# Patient Record
Sex: FEMALE | Race: BLACK OR AFRICAN AMERICAN | NOT HISPANIC OR LATINO | Employment: UNEMPLOYED | URBAN - METROPOLITAN AREA
[De-identification: names, ages, dates, MRNs, and addresses within clinical notes are randomized per-mention and may not be internally consistent; named-entity substitution may affect disease eponyms.]

---

## 2017-01-05 ENCOUNTER — OFFICE VISIT (OUTPATIENT)
Dept: FAMILY MEDICINE | Facility: CLINIC | Age: 52
End: 2017-01-05
Payer: MEDICARE

## 2017-01-05 ENCOUNTER — RADIANT APPOINTMENT (OUTPATIENT)
Dept: GENERAL RADIOLOGY | Facility: CLINIC | Age: 52
End: 2017-01-05
Attending: FAMILY MEDICINE
Payer: MEDICARE

## 2017-01-05 VITALS
OXYGEN SATURATION: 99 % | WEIGHT: 262 LBS | DIASTOLIC BLOOD PRESSURE: 98 MMHG | TEMPERATURE: 98.6 F | HEIGHT: 61 IN | HEART RATE: 92 BPM | SYSTOLIC BLOOD PRESSURE: 150 MMHG | BODY MASS INDEX: 49.47 KG/M2

## 2017-01-05 DIAGNOSIS — H10.32 ACUTE BACTERIAL CONJUNCTIVITIS OF LEFT EYE: ICD-10-CM

## 2017-01-05 DIAGNOSIS — R51.9 ACUTE NONINTRACTABLE HEADACHE, UNSPECIFIED HEADACHE TYPE: Primary | ICD-10-CM

## 2017-01-05 DIAGNOSIS — S80.01XA CONTUSION OF RIGHT KNEE, INITIAL ENCOUNTER: ICD-10-CM

## 2017-01-05 PROCEDURE — 96372 THER/PROPH/DIAG INJ SC/IM: CPT | Performed by: FAMILY MEDICINE

## 2017-01-05 PROCEDURE — 99215 OFFICE O/P EST HI 40 MIN: CPT | Mod: 25 | Performed by: FAMILY MEDICINE

## 2017-01-05 PROCEDURE — 73562 X-RAY EXAM OF KNEE 3: CPT | Mod: RT

## 2017-01-05 RX ORDER — METHYLPREDNISOLONE 4 MG
TABLET, DOSE PACK ORAL
Qty: 21 TABLET | Refills: 0 | Status: SHIPPED | OUTPATIENT
Start: 2017-01-05 | End: 2017-02-21

## 2017-01-05 RX ORDER — OFLOXACIN 3 MG/ML
SOLUTION/ DROPS OPHTHALMIC
Qty: 1 BOTTLE | Refills: 0 | Status: SHIPPED | OUTPATIENT
Start: 2017-01-05 | End: 2017-02-21

## 2017-01-05 RX ORDER — IBUPROFEN 600 MG/1
600 TABLET, FILM COATED ORAL EVERY 6 HOURS PRN
Qty: 60 TABLET | Refills: 0 | Status: SHIPPED | OUTPATIENT
Start: 2017-01-05 | End: 2017-02-21

## 2017-01-05 NOTE — NURSING NOTE
"Chief Complaint   Patient presents with     URI     Knee Pain     right       Initial /98 mmHg  Pulse 92  Temp(Src) 98.6  F (37  C) (Tympanic)  Ht 5' 1\" (1.549 m)  Wt 262 lb (118.842 kg)  BMI 49.53 kg/m2  SpO2 99% Estimated body mass index is 49.53 kg/(m^2) as calculated from the following:    Height as of this encounter: 5' 1\" (1.549 m).    Weight as of this encounter: 262 lb (118.842 kg)..  BP completed using cuff size: large Sarah Severson, CMA  "

## 2017-01-05 NOTE — PROGRESS NOTES
SUBJECTIVE:                                                    Tish De León is a 51 year old female who presents to clinic today for the following health issues:      C/o headache off and on for 3 weeks.   Acute illness concerns: left eye is swollen and red. Woke up this morning with it.     Fever: YES    Chills/Sweats: YES    Headache (location?): YES    Sinus Pressure:YES    Conjunctivitis:  YES: left    Ear Pain: no    Rhinorrhea: no     Congestion: YES    Sore Throat: YES     Cough: YES    Wheeze: no     Decreased Appetite: no     Nausea: no    Vomiting: no    Diarrhea:  no    Dysuria/Freq.: no    Fatigue/Achiness: YES    Sick/Strep Exposure: no      Therapies Tried and outcome: OTC meds and 4 ER visits    Concern - Fall - right knee pain     Onset: Tuesday    Description:   Right knee pain, swelling due to fall    Intensity: moderate, severe    Progression of Symptoms:  worsening    Accompanying Signs & Symptoms:         Previous history of similar problem:       Precipitating factors:   Worsened by:     Alleviating factors:  Improved by:        Therapies Tried and outcome: ice, ibuprofen, tylenol         Problem list and histories reviewed & adjusted, as indicated.  Additional history: as documented    Patient Active Problem List   Diagnosis     Hyperlipidemia LDL goal <160     Major depressive disorder, single episode, severe (H)     ZOE (obstructive sleep apnea)     BMI 45.0-49.9, adult (H)     Vitamin D deficiency disease     Intertrigo     Obesity     Intermittent asthma     Dependent edema     Knee pain     Acute abdominal pain     Gastroenteritis     GE (gastroenteritis)     Sickle cell trait (H)     Anxiety     Gastritis, Helicobacter pylori     Elbow pain     Esophageal reflux     Morbid obesity, unspecified obesity type (H)     Duodenal ulcer without hemorrhage or perforation and without obstruction     Advanced directives, counseling/discussion     Binge eating disorder     Past Surgical History    Procedure Laterality Date     Sinus surgery  10/2011     Nasal surgery      section  1993     x4     Esophagoscopy, gastroscopy, duodenoscopy (egd), combined  2013     Procedure: COMBINED ESOPHAGOSCOPY, GASTROSCOPY, DUODENOSCOPY (EGD), BIOPSY SINGLE OR MULTIPLE;  COMBINED ESOPHAGOSCOPY, GASTROSCOPY, DUODENOSCOPY (EGD);  Surgeon: Luis Ma MD;  Location:  GI     Cholecystectomy  2015     Esophagoscopy, gastroscopy, duodenoscopy (egd), combined N/A 2016     Procedure: COMBINED ESOPHAGOSCOPY, GASTROSCOPY, DUODENOSCOPY (EGD);  Surgeon: Kirit Hutchinson MD;  Location:  GI     Esophagoscopy, gastroscopy, duodenoscopy (egd), combined N/A 2016     Procedure: COMBINED ESOPHAGOSCOPY, GASTROSCOPY, DUODENOSCOPY (EGD), BIOPSY SINGLE OR MULTIPLE;  Surgeon: Kirit Hutchinson MD;  Location:  GI       Social History   Substance Use Topics     Smoking status: Never Smoker      Smokeless tobacco: Never Used     Alcohol Use: No     Family History   Problem Relation Age of Onset     Hypertension Mother      Depression Sister      Anxiety Disorder Sister      Bipolar Disorder Son      Anxiety Disorder Paternal Aunt      Depression Paternal Aunt          Current Outpatient Prescriptions   Medication Sig Dispense Refill     methylPREDNISolone (MEDROL DOSEPAK) 4 MG tablet Follow package instructions 21 tablet 0     ofloxacin (OCUFLOX) 0.3 % ophthalmic solution Instill 1-2 drops in the affected eye(s) every 2 hours while awake for 2 days then 1-2 drops every 4 hours while awake for the next 5 days. 1 Bottle 0     ibuprofen (ADVIL/MOTRIN) 600 MG tablet Take 1 tablet (600 mg) by mouth every 6 hours as needed for moderate pain 60 tablet 0     dexlansoprazole (DEXILANT) 30 MG CPDR Take 1 capsule (30 mg) by mouth 2 times daily 60 capsule 1     cyclobenzaprine (FLEXERIL) 5 MG tablet Take 1-2 tablets (5-10 mg) by mouth 2 times daily as needed for muscle spasms 60 tablet 1     sucralfate (CARAFATE) 1 GM tablet  "Take 1 tablet (1 g) by mouth 4 times daily 40 tablet 1     fluticasone (FLONASE) 50 MCG/ACT nasal spray Spray 2 sprays into both nostrils daily 16 g 1     ketotifen (ZADITOR) 0.025 % SOLN Place 1 drop into both eyes every 12 hours 1 Bottle 11     furosemide (LASIX) 20 MG tablet Take 1 tablet (20 mg) by mouth 2 times daily 60 tablet 1     potassium chloride (K-DUR) 10 MEQ tablet Take 1 tablet (10 mEq) by mouth daily 30 tablet 1     zolpidem (AMBIEN) 10 MG tablet        Cholecalciferol (VITAMIN D) 2000 UNITS tablet Take 6000 IU daily for 6 weeks then take 2000 IU daily therafter 100 tablet 3     fluticasone-salmeterol (ADVAIR DISKUS) 100-50 MCG/DOSE diskus inhaler Inhale 1 puff into the lungs 2 times daily 1 Inhaler 12     calcium carbonate (OS- MG [place]. [place]) 500 MG tablet Take 1 tablet (500 mg) by mouth 2 times daily 180 tablet 0     albuterol (PROAIR HFA, PROVENTIL HFA, VENTOLIN HFA) 108 (90 BASE) MCG/ACT inhaler Inhale 2 puffs into the lungs every 6 hours as needed for shortness of breath / dyspnea 3 Inhaler 1     No Known Allergies    ROS:  INTEGUMENTARY/SKIN: NEGATIVE for worrisome rashes, moles or lesions  CV: NEGATIVE for chest pain, palpitations or peripheral edema    OBJECTIVE:                                                    /98 mmHg  Pulse 92  Temp(Src) 98.6  F (37  C) (Tympanic)  Ht 5' 1\" (1.549 m)  Wt 262 lb (118.842 kg)  BMI 49.53 kg/m2  SpO2 99%  Body mass index is 49.53 kg/(m^2).   GENERAL: healthy, alert, well nourished, well hydrated, mild distress. Patient laying down on the clinic tablet due to intense HA.  EYES: left conjunctiva is injected and swelling of upper lid noted.   HENT: ear canals- normal; TMs- normal; Nose- normal; no sinus tenderness. Mouth- no ulcers, no lesions  NECK: no tenderness, no adenopathy, no asymmetry, no masses, no stiffness; thyroid- normal to palpation  RESP: lungs clear to auscultation - no rales, no rhonchi, no wheezes  CV: regular rates and rhythm, " normal S1 S2, no S3 or S4 and no murmur, no click or rub   Right knee: no ecchymosis or swelling. Mild tenderness anterior on the knee joint.          ASSESSMENT/PLAN:                                                      1. Acute nonintractable headache, unspecified headache type  Questionable etiology. Toradol and solumedrol injections given in the clinic today.   Start solumedrol dose pack and ibuprofen prn tomorrow if not improving. Stay well hydrated. If any worsening of symptoms noted, may need to f/u or go to ER  - methylPREDNISolone (MEDROL DOSEPAK) 4 MG tablet; Follow package instructions  Dispense: 21 tablet; Refill: 0  - ketorolac (TORADOL) 30 MG/ML injection; Inject 1 mL (30 mg) into the muscle once for 1 dose  Dispense: 1 mL; Refill: 0  - methylPREDNISolone sodium succinate (SOLU-MEDROL) 40 MG injection; Inject 1 mL (40 mg) into the vein once for 1 dose  Dispense: 1 mL; Refill: 0    2. Acute bacterial conjunctivitis of left eye  Recommended to ofloxacin eye drops. NSAID's for pain and inflammation control. Cool compresses and hand hygiene discussed.   - ofloxacin (OCUFLOX) 0.3 % ophthalmic solution; Instill 1-2 drops in the affected eye(s) every 2 hours while awake for 2 days then 1-2 drops every 4 hours while awake for the next 5 days.  Dispense: 1 Bottle; Refill: 0    3. Contusion of right knee, initial encounter    - ibuprofen (ADVIL/MOTRIN) 600 MG tablet; Take 1 tablet (600 mg) by mouth every 6 hours as needed for moderate pain  Dispense: 60 tablet; Refill: 0  - XR Knee Right 3 Views; ordered and reviewed. No fracture noted. Recommended to f/u if no improvement noted in 2-3 weeks.         Follow up with Provider - as needed  Total time spent was 45 minutes, more than half the time was spent in counseling the patient about the disease pathogenesis, treatment plan and coordinating care.       Willi Aguilar MD  [facility]

## 2017-01-06 RX ORDER — KETOROLAC TROMETHAMINE 30 MG/ML
30 INJECTION, SOLUTION INTRAMUSCULAR; INTRAVENOUS ONCE
Qty: 1 ML | Refills: 0 | OUTPATIENT
Start: 2017-01-06 | End: 2017-01-06

## 2017-01-06 RX ORDER — METHYLPREDNISOLONE SODIUM SUCCINATE 40 MG/ML
40 INJECTION, POWDER, LYOPHILIZED, FOR SOLUTION INTRAMUSCULAR; INTRAVENOUS ONCE
Qty: 1 ML | Refills: 0 | OUTPATIENT
Start: 2017-01-06 | End: 2017-01-06

## 2017-01-19 ENCOUNTER — TRANSFERRED RECORDS (OUTPATIENT)
Dept: HEALTH INFORMATION MANAGEMENT | Facility: CLINIC | Age: 52
End: 2017-01-19

## 2017-02-21 ENCOUNTER — OFFICE VISIT (OUTPATIENT)
Dept: FAMILY MEDICINE | Facility: CLINIC | Age: 52
End: 2017-02-21
Payer: MEDICARE

## 2017-02-21 VITALS
OXYGEN SATURATION: 98 % | DIASTOLIC BLOOD PRESSURE: 87 MMHG | HEIGHT: 61 IN | SYSTOLIC BLOOD PRESSURE: 130 MMHG | TEMPERATURE: 98.3 F | BODY MASS INDEX: 50.22 KG/M2 | WEIGHT: 266 LBS | HEART RATE: 94 BPM

## 2017-02-21 DIAGNOSIS — M25.561 RIGHT KNEE PAIN, UNSPECIFIED CHRONICITY: ICD-10-CM

## 2017-02-21 DIAGNOSIS — M79.89 SWELLING OF LIMB: ICD-10-CM

## 2017-02-21 DIAGNOSIS — E66.01 MORBID OBESITY, UNSPECIFIED OBESITY TYPE (H): ICD-10-CM

## 2017-02-21 DIAGNOSIS — E55.9 VITAMIN D DEFICIENCY DISEASE: ICD-10-CM

## 2017-02-21 DIAGNOSIS — K26.9 DUODENAL ULCER WITHOUT HEMORRHAGE OR PERFORATION AND WITHOUT OBSTRUCTION: ICD-10-CM

## 2017-02-21 DIAGNOSIS — J45.30 MILD PERSISTENT ASTHMA WITHOUT COMPLICATION: ICD-10-CM

## 2017-02-21 DIAGNOSIS — Z12.31 ENCOUNTER FOR SCREENING MAMMOGRAM FOR BREAST CANCER: ICD-10-CM

## 2017-02-21 DIAGNOSIS — R10.11 RUQ ABDOMINAL PAIN: Primary | ICD-10-CM

## 2017-02-21 PROCEDURE — 99214 OFFICE O/P EST MOD 30 MIN: CPT | Performed by: FAMILY MEDICINE

## 2017-02-21 RX ORDER — ALBUTEROL SULFATE 90 UG/1
2 AEROSOL, METERED RESPIRATORY (INHALATION) EVERY 6 HOURS PRN
Qty: 3 INHALER | Refills: 1 | Status: SHIPPED | OUTPATIENT
Start: 2017-02-21

## 2017-02-21 RX ORDER — CHOLECALCIFEROL (VITAMIN D3) 50 MCG
TABLET ORAL
Qty: 100 TABLET | Refills: 3 | Status: SHIPPED | OUTPATIENT
Start: 2017-02-21

## 2017-02-21 RX ORDER — DEXLANSOPRAZOLE 30 MG/1
30 CAPSULE, DELAYED RELEASE ORAL DAILY
Qty: 90 CAPSULE | Refills: 1 | Status: SHIPPED | OUTPATIENT
Start: 2017-02-21 | End: 2017-05-23

## 2017-02-21 RX ORDER — FUROSEMIDE 20 MG
20 TABLET ORAL DAILY PRN
COMMUNITY
Start: 2017-02-21 | End: 2017-03-17

## 2017-02-21 ASSESSMENT — ANXIETY QUESTIONNAIRES
GAD7 TOTAL SCORE: 17
6. BECOMING EASILY ANNOYED OR IRRITABLE: MORE THAN HALF THE DAYS
5. BEING SO RESTLESS THAT IT IS HARD TO SIT STILL: NOT AT ALL
7. FEELING AFRAID AS IF SOMETHING AWFUL MIGHT HAPPEN: NEARLY EVERY DAY
3. WORRYING TOO MUCH ABOUT DIFFERENT THINGS: NEARLY EVERY DAY
1. FEELING NERVOUS, ANXIOUS, OR ON EDGE: NEARLY EVERY DAY
2. NOT BEING ABLE TO STOP OR CONTROL WORRYING: NEARLY EVERY DAY

## 2017-02-21 ASSESSMENT — PATIENT HEALTH QUESTIONNAIRE - PHQ9: 5. POOR APPETITE OR OVEREATING: NEARLY EVERY DAY

## 2017-02-21 NOTE — MR AVS SNAPSHOT
After Visit Summary   2/21/2017    Tish De León    MRN: [id number]           Patient Information     Date Of Birth          1965        Visit Information        Provider Department      2/21/2017 1:40 PM Willi Aguilar MD [facility] [place]        Today's Diagnoses     Right knee pain, unspecified chronicity    -  1    Encounter for screening mammogram for breast cancer        RUQ abdominal pain        Duodenal ulcer without hemorrhage or perforation and without obstruction        Swelling of limb        Vitamin D deficiency disease        Mild persistent asthma without complication        Morbid obesity, unspecified obesity type (H)           Follow-ups after your visit        Additional Services     ENDOCRINOLOGY ADULT REFERRAL       Your provider has referred you to: [facility]: [facility] [phone]   [email or web address]      Please be aware that coverage of these services is subject to the terms and limitations of your health insurance plan.  Call member services at your health plan with any benefit or coverage questions.      Please bring the following to your appointment:    >>   Any x-rays, CTs or MRIs which have been performed.  Contact the facility where they were done to arrange for  prior to your scheduled appointment.    >>   List of current medications   >>   This referral request   >>   Any documents/labs given to you for this referral            ORTHO  REFERRAL       [facility] is referring you to the Orthopedic  Services at [facility].       The  Representative will assist you in the coordination of your Orthopedic and Musculoskeletal Care as prescribed by your physician.    The  Representative will call you within 1 business day to help schedule your appointment, or you may contact the  Representative at:    All areas ~ [phone]  [phone]     Type of Referral : Surgical / Specialist       Timeframe requested: 3 - 5 days    Coverage of these services is subject to the terms and limitations of your health insurance plan.  Please call member services at your health plan with any benefit or coverage questions.      If X-rays, CT or MRI's have been performed, please contact the facility where they were done to arrange for , prior to your scheduled appointment.  Please bring this referral request to your appointment and present it to your specialist.                  Follow-up notes from your care team     Return in about 2 weeks (around 3/7/2017) for mood recheck , asthma check .      Future tests that were ordered for you today     Open Future Orders        Priority Expected Expires Ordered    CT Abdomen Pelvis w/o & w Contrast Routine  2/21/2018 2/21/2017    *MA Screening Digital Bilateral Routine  2/21/2018 2/21/2017            Who to contact     If you have questions or need follow up information about today's clinic visit or your schedule please contact [facility] [place] directly at [phone].  Normal or non-critical lab and imaging results will be communicated to you by MyChart, letter or phone within 4 business days after the clinic has received the results. If you do not hear from us within 7 days, please contact the clinic through [email or web address] or phone. If you have a critical or abnormal lab result, we will notify you by phone as soon as possible.  Submit refill requests through [employer] or call your pharmacy and they will forward the refill request to us. Please allow 3 business days for your refill to be completed.          Additional Information About Your Visit        [email or web address] Information     [employer] lets you send messages to your doctor, view your test results, renew your prescriptions, schedule appointments and more. To sign up, go to [email or web address] . Click on \"Log in\" on the left side of the screen, which " "will take you to the Welcome page. Then click on \"Sign up Now\" on the right side of the page.     You will be asked to enter the access code listed below, as well as some personal information. Please follow the directions to create your username and password.     Your access code is: [email or web address]  Expires: 2017  2:28 PM     Your access code will  in 90 days. If you need help or a new code, please call your [place] clinic or [phone].        Care EveryWhere ID     This is your Care EveryWhere ID. This could be used by other organizations to access your [place] medical records  [id number]        Your Vitals Were     Pulse Temperature Height Pulse Oximetry BMI (Body Mass Index)       94 98.3  F (36.8  C) (Tympanic) 5' 1\" (1.549 m) 98% 50.26 kg/m2        Blood Pressure from Last 3 Encounters:   17 130/87   17 (!) 150/98   16 126/86    Weight from Last 3 Encounters:   17 266 lb (120.7 kg)   17 262 lb (118.8 kg)   16 267 lb (121.1 kg)              We Performed the Following     ENDOCRINOLOGY ADULT REFERRAL     ORTHO  REFERRAL          Today's Medication Changes          These changes are accurate as of: 17  2:28 PM.  If you have any questions, ask your nurse or doctor.               Start taking these medicines.        Dose/Directions    fluticasone-salmeterol 250-50 MCG/DOSE diskus inhaler   Commonly known as:  ADVAIR   Used for:  Mild persistent asthma without complication   Replaces:  fluticasone-salmeterol 100-50 MCG/DOSE diskus inhaler   Started by:  Willi Aguilar MD        Dose:  1 puff   Inhale 1 puff into the lungs 2 times daily   Quantity:  1 Inhaler   Refills:  3         These medicines have changed or have updated prescriptions.        Dose/Directions    dexlansoprazole 30 MG Cpdr CR capsule   Commonly known as:  DEXILANT   This may have changed:  when to take this   Used for:  Duodenal ulcer without hemorrhage or perforation and without " obstruction   Changed by:  Willi Aguilar MD        Dose:  30 mg   Take 1 capsule (30 mg) by mouth daily   Quantity:  90 capsule   Refills:  1       LASIX 20 MG tablet   This may have changed:    - when to take this  - reasons to take this   Used for:  Swelling of limb   Generic drug:  furosemide   Changed by:  Willi Aguilar MD        Dose:  20 mg   Take 1 tablet (20 mg) by mouth daily as needed   Refills:  0         Stop taking these medicines if you haven't already. Please contact your care team if you have questions.     cyclobenzaprine 5 MG tablet   Commonly known as:  FLEXERIL   Stopped by:  Willi Aguilar MD           fluticasone 50 MCG/ACT spray   Commonly known as:  FLONASE   Stopped by:  Willi Aguilar MD           fluticasone-salmeterol 100-50 MCG/DOSE diskus inhaler   Commonly known as:  ADVAIR DISKUS   Replaced by:  fluticasone-salmeterol 250-50 MCG/DOSE diskus inhaler   Stopped by:  Willi Aguilar MD           ibuprofen 600 MG tablet   Commonly known as:  ADVIL/MOTRIN   Stopped by:  Willi Aguilar MD           methylPREDNISolone 4 MG tablet   Commonly known as:  MEDROL DOSEPAK   Stopped by:  Willi Aguilar MD           ofloxacin 0.3 % ophthalmic solution   Commonly known as:  OCUFLOX   Stopped by:  Willi Aguilar MD           sucralfate 1 GM tablet   Commonly known as:  CARAFATE   Stopped by:  Willi Aguilar MD                Where to get your medicines      These medications were sent to [facility] [place] [address] [facility] [address]     Phone:  [phone]     albuterol 108 (90 BASE) MCG/ACT Inhaler    dexlansoprazole 30 MG Cpdr CR capsule    fluticasone-salmeterol 250-50 MCG/DOSE diskus inhaler    vitamin D 2000 UNITS tablet                Primary Care Provider Office Phone # Fax #    Willi Aguilar [phone]       [address]  [place] [address]        Thank you!      Thank you for choosing [facility] [place]  for your care. Our goal is always to provide you with excellent care. Hearing back from our patients is one way we can continue to improve our services. Please take a few minutes to complete the written survey that you may receive in the mail after your visit with us. Thank you!             Your Updated Medication List - Protect others around you: Learn how to safely use, store and throw away your medicines at www.disposemymeds.org.          This list is accurate as of: 2/21/17  2:28 PM.  Always use your most recent med list.                   Brand Name Dispense Instructions for use    albuterol 108 (90 BASE) MCG/ACT Inhaler    PROAIR HFA/PROVENTIL HFA/VENTOLIN HFA    3 Inhaler    Inhale 2 puffs into the lungs every 6 hours as needed for shortness of breath / dyspnea       calcium carbonate 500 MG tablet    OS- mg [place]. [place]    180 tablet    Take 1 tablet (500 mg) by mouth 2 times daily       dexlansoprazole 30 MG Cpdr CR capsule    DEXILANT    90 capsule    Take 1 capsule (30 mg) by mouth daily       fluticasone-salmeterol 250-50 MCG/DOSE diskus inhaler    ADVAIR    1 Inhaler    Inhale 1 puff into the lungs 2 times daily       ketotifen 0.025 % Soln ophthalmic solution    ZADITOR    1 Bottle    Place 1 drop into both eyes every 12 hours       LASIX 20 MG tablet   Generic drug:  furosemide      Take 1 tablet (20 mg) by mouth daily as needed       potassium chloride 10 MEQ tablet    K-TAB,KLOR-CON    30 tablet    Take 1 tablet (10 mEq) by mouth daily       vitamin D 2000 UNITS tablet     100 tablet    Take 6000 IU daily for 6 weeks then take 2000 IU daily therafter       zolpidem 10 MG tablet    AMBIEN

## 2017-02-21 NOTE — NURSING NOTE
"Chief Complaint   Patient presents with     Knee Pain     Abdominal Pain       Initial /87 (BP Location: Left arm, Patient Position: Chair, Cuff Size: Adult Large)  Pulse 94  Temp 98.3  F (36.8  C) (Tympanic)  Ht 5' 1\" (1.549 m)  Wt 266 lb (120.7 kg)  SpO2 98%  BMI 50.26 kg/m2 Estimated body mass index is 50.26 kg/(m^2) as calculated from the following:    Height as of this encounter: 5' 1\" (1.549 m).    Weight as of this encounter: 266 lb (120.7 kg).  Medication Reconciliation: complete  "

## 2017-02-21 NOTE — PROGRESS NOTES
SUBJECTIVE:                                                    Tish De León is a 51 year old female who presents to clinic today for the following health issues:      ABDOMINAL PAIN     Onset: flare-up    Description:   Character: Sharp  Location: right upper quadrant  Radiation: Back    Intensity: moderate, severe    Progression of Symptoms:  worsening    Accompanying Signs & Symptoms:  Fever/Chills?: no   Gas/Bloating: YES  Nausea: no   Vomitting: no   Diarrhea?: no   Constipation:YES  Dysuria or Hematuria: no    History:   Trauma: no   Previous similar pain: YES   Previous tests done: gallbladder removed about 3-4 years ago    Precipitating factors:   Does the pain change with:     Food: no      BM: no     Urination: no     Alleviating factors:      Therapies Tried and outcome: ibuprofen     LMP:  unsure     Joint Pain     Onset: flare up    Description:   Location: right knee  Character: Sharp    Intensity: moderate, severe    Progression of Symptoms: worse    Accompanying Signs & Symptoms:  Other symptoms: numbness and tingling   History:   Previous similar pain: no       Precipitating factors:   Trauma or overuse: YES fell about a month ago and has got worse since then      Alleviating factors:  Improved by: nothing       Therapies Tried and outcome: tried everything....nothing helps    GERD : well controlled with Dexilant. Needs refills.   Leg swelling is stable with lasix.     Asthma is not well controlled.     ACT Total Scores 2/21/2017   ACT TOTAL SCORE (Goal Greater than or Equal to 20) 12   In the past 12 months, how many times did you visit the emergency room for your asthma without being admitted to the hospital? 0   In the past 12 months, how many times were you hospitalized overnight because of your asthma? 0       Problem list and histories reviewed & adjusted, as indicated.  Additional history: as documented    Patient Active Problem List   Diagnosis     Hyperlipidemia LDL goal <160     Major  depressive disorder, single episode, severe (H)     ZOE (obstructive sleep apnea)     BMI 45.0-49.9, adult (H)     Vitamin D deficiency disease     Intertrigo     Obesity     Intermittent asthma     Dependent edema     Knee pain     Acute abdominal pain     Gastroenteritis     GE (gastroenteritis)     Sickle cell trait (H)     Anxiety     Gastritis, Helicobacter pylori     Elbow pain     Esophageal reflux     Morbid obesity, unspecified obesity type (H)     Duodenal ulcer without hemorrhage or perforation and without obstruction     Advanced directives, counseling/discussion     Binge eating disorder     Past Surgical History   Procedure Laterality Date     Sinus surgery  10/2011     Nasal surgery      section  1993     x4     Esophagoscopy, gastroscopy, duodenoscopy (egd), combined  2013     Procedure: COMBINED ESOPHAGOSCOPY, GASTROSCOPY, DUODENOSCOPY (EGD), BIOPSY SINGLE OR MULTIPLE;  COMBINED ESOPHAGOSCOPY, GASTROSCOPY, DUODENOSCOPY (EGD);  Surgeon: Luis Ma MD;  Location:  GI     Cholecystectomy  2015     Esophagoscopy, gastroscopy, duodenoscopy (egd), combined N/A 2016     Procedure: COMBINED ESOPHAGOSCOPY, GASTROSCOPY, DUODENOSCOPY (EGD);  Surgeon: Kirit Hutchinson MD;  Location:  GI     Esophagoscopy, gastroscopy, duodenoscopy (egd), combined N/A 2016     Procedure: COMBINED ESOPHAGOSCOPY, GASTROSCOPY, DUODENOSCOPY (EGD), BIOPSY SINGLE OR MULTIPLE;  Surgeon: Kirit Hutchinson MD;  Location:  GI       Social History   Substance Use Topics     Smoking status: Never Smoker     Smokeless tobacco: Never Used     Alcohol use No     Family History   Problem Relation Age of Onset     Hypertension Mother      Depression Sister      Anxiety Disorder Sister      Bipolar Disorder Son      Anxiety Disorder Paternal Aunt      Depression Paternal Aunt          Current Outpatient Prescriptions   Medication Sig Dispense Refill     dexlansoprazole (DEXILANT) 30 MG CPDR CR capsule Take 1  "capsule (30 mg) by mouth daily 90 capsule 1     furosemide (LASIX) 20 MG tablet Take 1 tablet (20 mg) by mouth daily as needed       Cholecalciferol (VITAMIN D) 2000 UNITS tablet Take 6000 IU daily for 6 weeks then take 2000 IU daily therafter 100 tablet 3     fluticasone-salmeterol (ADVAIR) 250-50 MCG/DOSE diskus inhaler Inhale 1 puff into the lungs 2 times daily 1 Inhaler 3     albuterol (PROAIR HFA/PROVENTIL HFA/VENTOLIN HFA) 108 (90 BASE) MCG/ACT Inhaler Inhale 2 puffs into the lungs every 6 hours as needed for shortness of breath / dyspnea 3 Inhaler 1     ketotifen (ZADITOR) 0.025 % SOLN Place 1 drop into both eyes every 12 hours 1 Bottle 11     potassium chloride (K-DUR) 10 MEQ tablet Take 1 tablet (10 mEq) by mouth daily 30 tablet 1     zolpidem (AMBIEN) 10 MG tablet        calcium carbonate (OS- MG [place]. [place]) 500 MG tablet Take 1 tablet (500 mg) by mouth 2 times daily 180 tablet 0     No Known Allergies    ROS:  C: NEGATIVE for fever, chills, change in weight  INTEGUMENTARY/SKIN: NEGATIVE for worrisome rashes, moles or lesions  E/M: NEGATIVE for ear, mouth and throat problems  CV: NEGATIVE for chest pain, palpitations or peripheral edema    OBJECTIVE:                                                    /87 (BP Location: Left arm, Patient Position: Chair, Cuff Size: Adult Large)  Pulse 94  Temp 98.3  F (36.8  C) (Tympanic)  Ht 5' 1\" (1.549 m)  Wt 266 lb (120.7 kg)  SpO2 98%  BMI 50.26 kg/m2  Body mass index is 50.26 kg/(m^2).   GENERAL: healthy, alert, well nourished, well hydrated, no distress  HENT: ear canals- normal; TMs- normal; Nose- normal; Mouth- no ulcers, no lesions  NECK: no tenderness, no adenopathy, no asymmetry, no masses, no stiffness; thyroid- normal to palpation  RESP: lungs clear to auscultation - no rales, no rhonchi, no wheezes  CV: regular rates and rhythm, normal S1 S2, no S3 or S4 and no murmur, no click or rub -  ABDOMEN: soft, no tenderness, no  hepatosplenomegaly, no " masses, normal bowel sounds         ASSESSMENT/PLAN:                                                      1. RUQ abdominal pain  Questionable etiology. Recommended to get CT as ordered below for further evaluation.   - CT Abdomen Pelvis w/o & w Contrast; Future    2. Right knee pain, unspecified chronicity  Recommended to see ortho for management of ongoing right knee pain. Weight loss recommended  - ORTHO  REFERRAL    3. Duodenal ulcer without hemorrhage or perforation and without obstruction  Refill on dexilant ordered. Symptoms are well controlled.   - dexlansoprazole (DEXILANT) 30 MG CPDR CR capsule; Take 1 capsule (30 mg) by mouth daily  Dispense: 90 capsule; Refill: 1    4. Swelling of limb  Use Lasix prn.  - furosemide (LASIX) 20 MG tablet; Take 1 tablet (20 mg) by mouth daily as needed    5. Vitamin D deficiency disease  Continue vit D  - Cholecalciferol (VITAMIN D) 2000 UNITS tablet; Take 6000 IU daily for 6 weeks then take 2000 IU daily therafter  Dispense: 100 tablet; Refill: 3    6. Asthma - not well controlled.     Increase the dose of Advair from 100/50 to 250/50 mcg.  Albuterol ordered. F/u in 2 weeks for a recheck.   - fluticasone-salmeterol (ADVAIR) 250-50 MCG/DOSE diskus inhaler; Inhale 1 puff into the lungs 2 times daily  Dispense: 1 Inhaler; Refill: 3  - albuterol (PROAIR HFA/PROVENTIL HFA/VENTOLIN HFA) 108 (90 BASE) MCG/ACT Inhaler; Inhale 2 puffs into the lungs every 6 hours as needed for shortness of breath / dyspnea  Dispense: 3 Inhaler; Refill: 1    7. Encounter for screening mammogram for breast cancer  Screening mammogram ordered  - *MA Screening Digital Bilateral; Future    8. Morbid obesity, unspecified obesity type (H)  Recommended to see endocrinology for weight loss management.   - ENDOCRINOLOGY ADULT REFERRAL         Willi Aguilar [place]  [facility] [place]

## 2017-02-22 ASSESSMENT — PATIENT HEALTH QUESTIONNAIRE - PHQ9: SUM OF ALL RESPONSES TO PHQ QUESTIONS 1-9: 19

## 2017-02-22 ASSESSMENT — ASTHMA QUESTIONNAIRES: ACT_TOTALSCORE: 12

## 2017-02-22 ASSESSMENT — ANXIETY QUESTIONNAIRES: GAD7 TOTAL SCORE: 17

## 2017-02-28 ENCOUNTER — PRE VISIT (OUTPATIENT)
Dept: ORTHOPEDICS | Facility: CLINIC | Age: 52
End: 2017-02-28

## 2017-02-28 NOTE — TELEPHONE ENCOUNTER
Pt reports being seen for : Right knee pain, fell 1 month ago  1. Do you have recent xrays (if not seen in EPIC)? Yes - Xray are in EPIC.  2. Do you have any MRI's (if not seen in EPIC)?No.   3. Have you had surgery in the past for the same issue?No.   4. Are you being referred by another provider? Yes: Dr. Aguilar  If yes-Records in Epic.  5. Is this work comp or MVA related? No.   Chloe Santos RN

## 2017-03-09 ENCOUNTER — OFFICE VISIT (OUTPATIENT)
Dept: ORTHOPEDICS | Facility: CLINIC | Age: 52
End: 2017-03-09
Payer: MEDICARE

## 2017-03-09 VITALS — OXYGEN SATURATION: 99 % | HEART RATE: 90 BPM | SYSTOLIC BLOOD PRESSURE: 131 MMHG | DIASTOLIC BLOOD PRESSURE: 85 MMHG

## 2017-03-09 DIAGNOSIS — M17.11 PRIMARY OSTEOARTHRITIS OF RIGHT KNEE: Primary | ICD-10-CM

## 2017-03-09 PROCEDURE — 99203 OFFICE O/P NEW LOW 30 MIN: CPT | Mod: GC | Performed by: ORTHOPAEDIC SURGERY

## 2017-03-09 ASSESSMENT — PAIN SCALES - GENERAL: PAINLEVEL: WORST PAIN (10)

## 2017-03-09 NOTE — MR AVS SNAPSHOT
After Visit Summary   3/9/2017    Tish De León    MRN: [id number]           Patient Information     Date Of Birth          1965        Visit Information        Provider Department      3/9/2017 1:30 PM Matthias Rosales MD [facility]        Today's Diagnoses     Primary osteoarthritis of right knee    -  1      Care Instructions    Thanks for coming today.  Ortho/Sports Medicine Clinic  [address] [place] [id number]    To schedule future appointments in Ortho Clinic, you may call [phone].    To schedule ordered imaging by your Provider: Call [facility] at [phone]    [employer] available online at:   [employer]    Please call if any further questions or concerns [phone] and ask for the Orthopedic Department. Clinic hours 8 am to 5 pm.    Return to clinic if symptoms worsen.          Follow-ups after your visit        Your next 10 appointments already scheduled     Mar 16, 2017  1:45 PM CDT   CT ABDOMEN PELVIS W/O & W CONTRAST with MGCT1   [facility] ([facility]    [address] [address] [place] [id number] [phone]           Please bring any scans or X-rays taken at other hospitals, if similar tests were done. Also bring a list of your medicines, including vitamins, minerals and over-the-counter drugs. It is safest to leave personal items at home.  Be sure to tell your doctor:   If you have any allergies.   If there s any chance you are pregnant.   If you are breastfeeding.   If you have any special needs.  You may have contrast for this exam. To prepare:   Do not eat or drink for 2 hours before your exam. If you need to take medicine, you may take it with small sips of water. (We may ask you to take liquid medicine as well.)   The day before your exam, drink extra fluids at least six 8-ounce glasses (unless your doctor tells you to restrict your fluids).  Patients over 70  or patients with diabetes or kidney problems:   If you haven t had a blood test (creatinine test) within the last 30 days, go to your clinic or Diagnostic Imaging Department for this test.  If you have diabetes:   If your kidney function is normal, continue taking your metformin (Avandamet, Glucophage, Glucovance, Metaglip) on the day of your exam.   If your kidney function is abnormal, wait 48 hours before restarting this medicine.  You will have oral contrast for this exam:   You will drink the contrast at home. Get this from your clinic or Diagnostic Imaging Department. Please follow the directions given.  Please wear loose clothing, such as a sweat suit or jogging clothes. Avoid snaps, zippers and other metal. We may ask you to undress and put on a hospital gown.  If you have any questions, please call the Imaging Department where you will have your exam.            Mar 16, 2017  2:15 PM CDT   MA SCREENING DIGITAL BILATERAL with MGMA1, MG MA TECH   [facility] ([facility]    [address] [place] [id number] [phone]           Do not use any powder, lotion or deodorant under your arms or on your breast. If you do, we will ask you to remove it before your exam.  Wear comfortable, two-piece clothing.  If you have any allergies, tell your care team.  Bring any previous mammograms from other facilities or have them mailed to the breast center. This mammogram location, [facility], now offers 3D mammography. It doesn't replace a screening mammogram and can be done with a regular screening mammogram. It is optional and not all insurances will pay for it. 3D mammography is a special kind of mammogram that produces a three-dimensional image of the breast by using low dose-xrays. 3D allows the radiologist to see the breast tissue differently from 2D, which reduces the chance of repeat testing due to overlapping breast tissue. If you are interested in have a  3D mammogram, please check with your insurance before you arrive for your exam. 3D mammography is offered to all patients. On the day of your exam you will be asked to sign a form stating yes, you wish to have 3D imaging or, no, you decline.            Mar 29, 2017 12:30 PM CDT   New Visit with Rina Peguero MD   [facility] ([facility])    [address]  [facility] [id number] [phone]              Who to contact     If you have questions or need follow up information about today's clinic visit or your schedule please contact [facility] directly at [phone].  Normal or non-critical lab and imaging results will be communicated to you by [employer], letter or phone within 4 business days after the clinic has received the results. If you do not hear from us within 7 days, please contact the clinic through [employer] or phone. If you have a critical or abnormal lab result, we will notify you by phone as soon as possible.  Submit refill requests through [employer] or call your pharmacy and they will forward the refill request to us. Please allow 3 business days for your refill to be completed.          Additional Information About Your Visit        [employer] Information     [employer] is an electronic gateway that provides easy, online access to your medical records. With [employer], you can request a clinic appointment, read your test results, renew a prescription or communicate with your care team.     To sign up for [employer] visit the website at [email or web address]   You will be asked to enter the access code listed below, as well as some personal information. Please follow the directions to create your username and password.     Your access code is: [email or web address]  Expires: 2017  2:28 PM     Your access code will  in 90 days. If you need help or a new code, please contact your [facility] or call  [phone] for assistance.        Care EveryWhere ID     This is your Care EveryWhere ID. This could be used by other organizations to access your [facility] medical records  [id number]        Your Vitals Were     Pulse Pulse Oximetry                90 99%           Blood Pressure from Last 3 Encounters:   03/09/17 131/85   02/21/17 130/87   01/05/17 (!) 150/98    Weight from Last 3 Encounters:   02/21/17 120.7 kg (266 lb)   01/05/17 118.8 kg (262 lb)   09/28/16 121.1 kg (267 lb)              Today, you had the following     No orders found for display       Primary Care Provider Office Phone # Fax #    Willi Aguilar [phone]       [facility] [address] [place] [address] [address]  [place] [address]        Thank you!     Thank you for choosing [facility]  for your care. Our goal is always to provide you with excellent care. Hearing back from our patients is one way we can continue to improve our services. Please take a few minutes to complete the written survey that you may receive in the mail after your visit with us. Thank you!             Your Updated Medication List - Protect others around you: Learn how to safely use, store and throw away your medicines at www.disposemymeds.org.          This list is accurate as of: 3/9/17 11:59 PM.  Always use your most recent med list.                   Brand Name Dispense Instructions for use    albuterol 108 (90 BASE) MCG/ACT Inhaler    PROAIR HFA/PROVENTIL HFA/VENTOLIN HFA    3 Inhaler    Inhale 2 puffs into the lungs every 6 hours as needed for shortness of breath / dyspnea       calcium carbonate 500 MG tablet    OS- mg [place]. [place]    180 tablet    Take 1 tablet (500 mg) by mouth 2 times daily       dexlansoprazole 30 MG Cpdr CR capsule    DEXILANT    90 capsule    Take 1 capsule (30 mg) by mouth daily       fluticasone-salmeterol 250-50 MCG/DOSE diskus inhaler    ADVAIR    1 Inhaler    Inhale 1 puff into the lungs 2 times  daily       ketotifen 0.025 % Soln ophthalmic solution    ZADITOR    1 Bottle    Place 1 drop into both eyes every 12 hours       LASIX 20 MG tablet   Generic drug:  furosemide      Take 1 tablet (20 mg) by mouth daily as needed       potassium chloride 10 MEQ tablet    K-TAB,KLOR-CON    30 tablet    Take 1 tablet (10 mEq) by mouth daily       vitamin D 2000 UNITS tablet     100 tablet    Take 6000 IU daily for 6 weeks then take 2000 IU daily therafter       zolpidem 10 MG tablet    AMBIEN

## 2017-03-09 NOTE — PATIENT INSTRUCTIONS
Thanks for coming today.  Ortho/Sports Medicine Clinic  [address] [place] [address]    To schedule future appointments in Ortho Clinic, you may call [phone].    To schedule ordered imaging by your Provider: Call [facility] at [phone]    [employer] available online at:   [email or web address]    Please call if any further questions or concerns [phone] and ask for the Orthopedic Department. Clinic hours 8 am to 5 pm.    Return to clinic if symptoms worsen.

## 2017-03-09 NOTE — PROGRESS NOTES
Patient seen and examined. Agree with history, physical and imaging as well as Assessment and Plan as detailed by Dr. Giles.    Assesment: Medial compartment OA    Plan: Patient seems to have failed nonop management - injections, time, nsaids (ulcers).    Patient BMI 50 and age 51 certainly are consideration prior to arthroplasty.    Patient will not likely see any benefit from arthroscopy, will refer to total joint surgeon to discuss the pros and cons of joint replacement.    F/u with me prn    I was present with the resident during the history and exam.  I discussed the case with the resident and agree with the findings as documented in the assessment and plan.

## 2017-03-09 NOTE — NURSING NOTE
"Tish De León's goals for this visit include: Right knee consult  She requests these members of her care team be copied on today's visit information: yes    PCP: Willi Aguilar    Referring Provider:  No referring provider defined for this encounter.    Chief Complaint   Patient presents with     Consult     Knee Pain     Right knee pain       Initial /85 (BP Location: Left arm)  Pulse 90  SpO2 99% Estimated body mass index is 50.26 kg/(m^2) as calculated from the following:    Height as of 2/21/17: 1.549 m (5' 1\").    Weight as of 2/21/17: 120.7 kg (266 lb).  Medication Reconciliation: complete    "

## 2017-03-09 NOTE — PROGRESS NOTES
REFERRING PROVIDER:  Willi Aguilar MD        REASON FOR REFERRAL:  Right knee pain.      HISTORY OF PRESENT ILLNESS:  Tish De León is a very pleasant 51-year-old female who presents with approximately 2-3 years of ongoing right knee pain that began without any specific inciting event.  She describes her pain as primarily over the medial aspect of her right knee, aching and throbbing in nature, occasionally becoming more sharp and stabbing in nature.  She feels the problem is getting worse with time and is aggravated by activities such as walking on it and relieved with rest.  She has tried activity modification and physical therapy as well as injections.  She states the injections are no longer effective.  It sounds as though she has seen many providers regarding this.  She has received both corticosteroids as well as viscosupplementation type injections.  She denies symptoms on her contralateral side.  She was referred by her primary care provider to the [facility] to discuss potential treatment options moving forward.  The patient states she feels she has exhausted her current treatment options.  The patient uses a wheelchair for getting around longer distances.  She is able to ambulate short distances but this does worsen her right knee pain.      PAST MEDICAL HISTORY:  Reviewed in the patient's electronic medical record.  Noted for GERD, obstructive sleep apnea, asthma, depression, anxiety as well as obesity.      PAST SURGICAL HISTORY:  Cholecystectomy.  No prior surgery on the right knee.      FAMILY HISTORY:  Reviewed on the patient's intake form noted for high blood pressure and arthritis.  Please see scanned documentation for further details.      REVIEW OF SYSTEMS:  A 14-point review of system reviewed on the patient's intake form.  Please see scanned documentation.  Pertinent positives per HPI.      SOCIAL HISTORY:  The patient is not employed.  She is disabled.  She lives at home with her  children.  She denies drug, tobacco or alcohol use.      PHYSICAL EXAMINATION:   GENERAL:  No acute distress, well-nourished female.   RESPIRATORY:  Nonlabored respirations on room air.   CARDIOVASCULAR:  Regular rate and rhythm.   EXTREMITIES:  Right lower extremity:  The patient is tender to palpation over the medial joint line.  There is no appreciable effusion.  She has crepitus with range of motion of the knees.  She has knee motion from 0 to approximately 100 degrees, limited due to body habitus.  She is stable to varus and valgus stress.  She is neurovascularly intact distally.     Left lower extremity:  The patient has no effusion, no erythema or warmth.  She has knee range of motion from full extension to 100 degrees of flexion, limited due to body habitus.  She is nontender to palpation over the medial or lateral joint line.  There is no crepitus appreciated with knee range of motion.  She is neurovascularly intact distally.      IMAGING:  Three-view x-rays of the right knee obtained 01/05/2017 are reviewed which demonstrate joint space narrowing in the medial compartment with subchondral sclerosis consistent with medial compartment wear.  The lateral compartment and patellofemoral compartment are relatively well-maintained.  There is a large soft tissue envelope.      ASSESSMENT:  A 51-year-old female with right knee osteoarthritis (primarily medial compartment).      PLAN:  We had a lengthy discussion with the patient today regarding the nature of her symptoms and correlated this with her history, physical and imaging studies.  She has right knee osteoarthritis involving primarily the medial compartment.  She has tried physical therapy and anti-inflammatories including ibuprofen, but she subsequently developed a duodenal ulcer.  She has also tried both viscosupplementation as well as corticosteroid injections.  She has persisting pain despite this.  Her physical exam aligns with her radiographic findings  of primarily medial compartment wear.  We discussed with her further treatment options including activity modification as well as weight loss as she has a BMI of 50.2.  She has an active Endocrine referral placed by her primary care provider to discuss potential weight loss strategies.  We did offer to refer her to a joint replacement surgeon either Dr. Fady Arias or Dr. Jeremy Castro through the [facility] to further discuss possible treatment options as we do not feel that an arthroscopic procedure nor a proximal tibial osteotomy to off load her medial compartment would be prudent surgeries for her given her symptoms as well as her obesity.  Shifting her load to the lateral compartment in the setting of morbid obesity would accelerate lateral compartment wear.  We also discussed that her elevated BMI would place her at a higher infection risk and risk of complication should a joint replacement ever be pursued.  She was made aware that some surgeons may not offer her surgery if her BMI is above a certain threshold due to the elevated complication risk.  She verbalized understanding of this, but would still like to discuss her symptoms with a joint placement surgeon.        Consideration could be had to provide home health services for the patient given her inability to care for herself.  She does have children who help provide assistance.  We will provide a handicap parking sticker given her limited mobility at this time, but would defer to her primary care provider to help to manage the possible referral to home health services.      The patient may follow up with Dr. Rosales as needed and may call with questions or concerns should they arise.      The patient was seen by and discussed with Dr. Rosales who is in agreement with the above assessment and plan.        Dictated by Clint Giles MD   Resident      cc:   Willi Aguilar MD   [address]   [address]

## 2017-03-16 ENCOUNTER — RADIANT APPOINTMENT (OUTPATIENT)
Dept: CT IMAGING | Facility: CLINIC | Age: 52
End: 2017-03-16
Attending: FAMILY MEDICINE
Payer: MEDICARE

## 2017-03-16 ENCOUNTER — RADIANT APPOINTMENT (OUTPATIENT)
Dept: MAMMOGRAPHY | Facility: CLINIC | Age: 52
End: 2017-03-16
Attending: FAMILY MEDICINE
Payer: MEDICARE

## 2017-03-16 DIAGNOSIS — R10.11 RUQ ABDOMINAL PAIN: ICD-10-CM

## 2017-03-16 DIAGNOSIS — K26.9 DUODENAL ULCER WITHOUT HEMORRHAGE OR PERFORATION AND WITHOUT OBSTRUCTION: ICD-10-CM

## 2017-03-16 DIAGNOSIS — Z12.31 ENCOUNTER FOR SCREENING MAMMOGRAM FOR BREAST CANCER: ICD-10-CM

## 2017-03-16 PROCEDURE — G0202 SCR MAMMO BI INCL CAD: HCPCS

## 2017-03-16 PROCEDURE — 74177 CT ABD & PELVIS W/CONTRAST: CPT | Performed by: RADIOLOGY

## 2017-03-16 RX ORDER — IOPAMIDOL 755 MG/ML
135 INJECTION, SOLUTION INTRAVASCULAR ONCE
Status: COMPLETED | OUTPATIENT
Start: 2017-03-16 | End: 2017-03-16

## 2017-03-16 RX ORDER — SUCRALFATE 1 G/1
1 TABLET ORAL 4 TIMES DAILY
Qty: 40 TABLET | Refills: 1 | Status: SHIPPED | OUTPATIENT
Start: 2017-03-16 | End: 2017-05-07

## 2017-03-16 RX ADMIN — IOPAMIDOL 135 ML: 755 INJECTION, SOLUTION INTRAVASCULAR at 14:16

## 2017-03-16 NOTE — TELEPHONE ENCOUNTER
Sucralfate      Last Written Prescription Date: 8/24/16  Last Fill Quantity: 40,  # refills: 1   Last Office Visit with G, P or [facility] prescribing provider: 2/21/17                                         Next 5 appointments (look out 90 days)     Mar 17, 2017  1:20 PM CDT   Office Visit with Willi Aguilar MD   [facility] ([facility])    [address] [place] [id number] [phone]

## 2017-03-16 NOTE — TELEPHONE ENCOUNTER
Routing refill request to provider for review/approval because:  Drug not active on patient's medication list    Vida Levine RN  [facility]  [phone]

## 2017-03-17 ENCOUNTER — OFFICE VISIT (OUTPATIENT)
Dept: FAMILY MEDICINE | Facility: CLINIC | Age: 52
End: 2017-03-17
Payer: MEDICARE

## 2017-03-17 VITALS
HEIGHT: 61 IN | HEART RATE: 96 BPM | OXYGEN SATURATION: 99 % | TEMPERATURE: 97.4 F | BODY MASS INDEX: 51.35 KG/M2 | WEIGHT: 272 LBS | SYSTOLIC BLOOD PRESSURE: 126 MMHG | DIASTOLIC BLOOD PRESSURE: 84 MMHG

## 2017-03-17 DIAGNOSIS — R10.11 ABDOMINAL PAIN, RIGHT UPPER QUADRANT: ICD-10-CM

## 2017-03-17 DIAGNOSIS — M79.661 PAIN OF RIGHT LOWER LEG: Primary | ICD-10-CM

## 2017-03-17 DIAGNOSIS — M79.89 SWELLING OF LIMB: ICD-10-CM

## 2017-03-17 DIAGNOSIS — J45.40 MODERATE PERSISTENT ASTHMA WITHOUT COMPLICATION: ICD-10-CM

## 2017-03-17 PROCEDURE — 99214 OFFICE O/P EST MOD 30 MIN: CPT | Performed by: FAMILY MEDICINE

## 2017-03-17 RX ORDER — POTASSIUM CHLORIDE 750 MG/1
10 TABLET, EXTENDED RELEASE ORAL DAILY PRN
Qty: 30 TABLET | Refills: 3 | Status: SHIPPED | OUTPATIENT
Start: 2017-03-17 | End: 2023-08-09

## 2017-03-17 RX ORDER — MONTELUKAST SODIUM 10 MG/1
10 TABLET ORAL AT BEDTIME
Qty: 90 TABLET | Refills: 3 | Status: SHIPPED | OUTPATIENT
Start: 2017-03-17

## 2017-03-17 RX ORDER — FUROSEMIDE 20 MG
20 TABLET ORAL DAILY PRN
Qty: 30 TABLET | Refills: 3 | Status: SHIPPED | OUTPATIENT
Start: 2017-03-17

## 2017-03-17 NOTE — MR AVS SNAPSHOT
After Visit Summary   3/17/2017    Tish De León    MRN: [id number]           Patient Information     Date Of Birth          1965        Visit Information        Provider Department      3/17/2017 1:20 PM Willi Aguilar MD [facility] [place]        Today's Diagnoses     Pain of right lower leg    -  1    Swelling of limb        Abdominal pain, right upper quadrant           Follow-ups after your visit        Additional Services     GASTROENTEROLOGY ADULT REF CONSULT ONLY       Preferred Location: [facility] [phone]      Please be aware that coverage of these services is subject to the terms and limitations of your health insurance plan.  Call member services at your health plan with any benefit or coverage questions.  Any procedures must be performed at a [facility] facility OR coordinated by your clinic's referral office.    Please bring the following with you to your appointment:    (1) Any X-Rays, CTs or MRIs which have been performed.  Contact the facility where they were done to arrange for  prior to your scheduled appointment.    (2) List of current medications   (3) This referral request   (4) Any documents/labs given to you for this referral            HOME CARE NURSING REFERRAL       **Order classes of: [facility], [facility] and [facility] will route to the Home Care and Hospice Referral Pool.  Home Care or Hospice will then contact the patient to schedule their appointment.**    If you do not hear from Home Care and Hospice, or you would like to call to schedule, please call the referring place of service that your provider has listed below.  ______________________________________________________________________    Your provider has referred you to: [facility]: [facility] [phone]   [email or web address]    Extended Emergency Contact Information  Primary Emergency Contact: Altagracia Schafer  States  Home Phone: none  Work Phone: none  Mobile Phone: [phone]  Relation: Daughter( Jozef)  Secondary Emergency Contact: NO SECONDARY CONTACT   [place]  Home Phone: NONE  Relation: None    Patient Anticipated Discharge Date:    RN, PT, HHA to begin 24 - 48 hours after discharge.  PLEASE EVALUATE AND TREAT (Evaluation timeline is 24 - 48 hrs. Please call if there is need for a variance to this timeline).    REASON FOR REFERRAL: Assessment & Treatment: PT, OT  and Fall Risk Assessment: Member indicated has fallen in last 12 months      ADDITIONAL SERVICES NEEDED: Home Aide    OTHER PERTINENT INFORMATION: Patient was last seen by provider on 3/17/17 for right leg pain.    Current Outpatient Prescriptions:  sucralfate (CARAFATE) 1 GM tablet, Take 1 tablet (1 g) by mouth 4 times daily, Disp: 40 tablet, Rfl: 1  dexlansoprazole (DEXILANT) 30 MG CPDR CR capsule, Take 1 capsule (30 mg) by mouth daily, Disp: 90 capsule, Rfl: 1  furosemide (LASIX) 20 MG tablet, Take 1 tablet (20 mg) by mouth daily as needed, Disp: , Rfl:   Cholecalciferol (VITAMIN D) 2000 UNITS tablet, Take 6000 IU daily for 6 weeks then take 2000 IU daily therafter, Disp: 100 tablet, Rfl: 3  fluticasone-salmeterol (ADVAIR) 250-50 MCG/DOSE diskus inhaler, Inhale 1 puff into the lungs 2 times daily, Disp: 1 Inhaler, Rfl: 3  albuterol (PROAIR HFA/PROVENTIL HFA/VENTOLIN HFA) 108 (90 BASE) MCG/ACT Inhaler, Inhale 2 puffs into the lungs every 6 hours as needed for shortness of breath / dyspnea, Disp: 3 Inhaler, Rfl: 1  ketotifen (ZADITOR) 0.025 % SOLN, Place 1 drop into both eyes every 12 hours, Disp: 1 Bottle, Rfl: 11  potassium chloride (K-DUR) 10 MEQ tablet, Take 1 tablet (10 mEq) by mouth daily, Disp: 30 tablet, Rfl: 1  zolpidem (AMBIEN) 10 MG tablet, , Disp: , Rfl:   calcium carbonate (OS- MG [place]. [place]) 500 MG tablet, Take 1 tablet (500 mg) by mouth 2 times daily, Disp: 180 tablet, Rfl: 0      Patient Active Problem List:      Hyperlipidemia LDL goal <160     Major depressive disorder, single episode, severe (H)     ZOE (obstructive sleep apnea)     BMI 45.0-49.9, adult (H)     Vitamin D deficiency disease     Intertrigo     Obesity     Intermittent asthma     Dependent edema     Knee pain     Acute abdominal pain     Gastroenteritis     GE (gastroenteritis)     Sickle cell trait (H)     Anxiety     Gastritis, Helicobacter pylori     Elbow pain     Esophageal reflux     Morbid obesity, unspecified obesity type (H)     Duodenal ulcer without hemorrhage or perforation and without obstruction     Advanced directives, counseling/discussion     Binge eating disorder      Documentation of Face to Face and Certification for Home Health Services    I certify that patient, Tish De León is under my care and that I, or a Nurse Practitioner or Physician's Assistant working with me, had a face-to-face encounter that meets the physician face-to-face encounter requirements with this patient on: 3/17/2017.    This encounter with the patient was in whole, or in part, for the following medical condition, which is the primary reason for Home Health Care: right leg and back pain and difficulty with walking. .    I certify that, based on my findings, the following services are medically necessary Home Health Services:  Occupational Therapy and Physical Therapy    My clinical findings support the need for the above services because:  Occupational Therapy Services are needed to assess and treat cognitive ability and address ADL safety due to impairment in walking. and Physical Therapy Services are needed to assess and treat the following functional impairments: leg and back pain.    Further, I certify that my clinical findings support that this patient is homebound (i.e. absences from home require considerable and taxing effort and are for medical reasons or [employer] services or infrequently or of short duration when for other reasons) because: Requires  assistance of another person or specialized equipment to access medical services because patient: Has prohibitive pain during ambulation. and Range of motion limitations prevents ability to exit home safely..    Based on the above findings, I certify that this patient is confined to the home and needs intermittent skilled nursing care, physical therapy and/or speech therapy.  The patient is under my care, and I have initiated the establishment of the plan of care.  This patient will be followed by a physician who will periodically review the plan of care.    Physician/Provider to provide follow up care: Willi Aguilar certified Physician at time of discharge:     Please be aware that coverage of these services is subject to the terms and limitations of your health insurance plan.  Call member services at your health plan with any benefit or coverage questions.                  Your next 10 appointments already scheduled     Mar 29, 2017 12:30 PM CDT   New Visit with Rina Peguero MD   [facility] ([facility])    [address] [facility] [address]  [facility] [address] [phone]              Who to contact     If you have questions or need follow up information about today's clinic visit or your schedule please contact [facility] [place] directly at [phone].  Normal or non-critical lab and imaging results will be communicated to you by MyChart, letter or phone within 4 business days after the clinic has received the results. If you do not hear from us within 7 days, please contact the clinic through MyChart or phone. If you have a critical or abnormal lab result, we will notify you by phone as soon as possible.  Submit refill requests through [facility] or call your pharmacy and they will forward the refill request to us. Please allow 3 business days for your refill to be completed.          Additional Information About Your Visit       "  MyChart Information     naaya lets you send messages to your doctor, view your test results, renew your prescriptions, schedule appointments and more. To sign up, go to [email or web address] . Click on \"Log in\" on the left side of the screen, which will take you to the Welcome page. Then click on \"Sign up Now\" on the right side of the page.     You will be asked to enter the access code listed below, as well as some personal information. Please follow the directions to create your username and password.     Your access code is: [email or web address]  Expires: 2017  3:28 PM     Your access code will  in 90 days. If you need help or a new code, please call your [place] clinic or [phone].        Care EveryWhere ID     This is your Care EveryWhere ID. This could be used by other organizations to access your [place] medical records  [id number]        Your Vitals Were     Pulse Temperature Height Pulse Oximetry BMI (Body Mass Index)       96 97.4  F (36.3  C) (Tympanic) 5' 1\" (1.549 m) 99% 51.39 kg/m2        Blood Pressure from Last 3 Encounters:   17 126/84   17 131/85   17 130/87    Weight from Last 3 Encounters:   17 272 lb (123.4 kg)   17 266 lb (120.7 kg)   17 262 lb (118.8 kg)              We Performed the Following     GASTROENTEROLOGY ADULT REF CONSULT ONLY     HOME CARE NURSING REFERRAL          Today's Medication Changes          These changes are accurate as of: 3/17/17  2:02 PM.  If you have any questions, ask your nurse or doctor.               Start taking these medicines.        Dose/Directions    acetaminophen-codeine 300-30 MG per tablet   Commonly known as:  TYLENOL #3   Used for:  Pain of right lower leg   Started by:  Willi Aguliar MD        Dose:  1 tablet   Take 1 tablet by mouth 2 times daily as needed for pain   Quantity:  30 tablet   Refills:  0       order for DME   Used for:  Pain of right lower leg   Started by:  Willi Aguilar MD        " Equipment being ordered: Wide walker with brakes and seat.   Quantity:  1 each   Refills:  0         These medicines have changed or have updated prescriptions.        Dose/Directions    potassium chloride 10 MEQ tablet   Commonly known as:  K-TAB,KLOR-CON   This may have changed:    - when to take this  - reasons to take this   Used for:  Swelling of limb   Changed by:  Willi Aguilar MD        Dose:  10 mEq   Take 1 tablet (10 mEq) by mouth daily as needed for potassium supplementation   Quantity:  30 tablet   Refills:  3            Where to get your medicines      These medications were sent to [facility] [place], [place] - [address] [place] [address]  [address] [facility], [place] [address]     Phone:  [phone]     furosemide 20 MG tablet    potassium chloride 10 MEQ tablet         Some of these will need a paper prescription and others can be bought over the counter.  Ask your nurse if you have questions.     Bring a paper prescription for each of these medications     acetaminophen-codeine 300-30 MG per tablet    order for DME                Primary Care Provider Office Phone # Fax #    Willi Aguilar [phone]       [facility] [address] [place] [address] [address]  [place] [address]        Thank you!     Thank you for choosing [facility]  for your care. Our goal is always to provide you with excellent care. Hearing back from our patients is one way we can continue to improve our services. Please take a few minutes to complete the written survey that you may receive in the mail after your visit with us. Thank you!             Your Updated Medication List - Protect others around you: Learn how to safely use, store and throw away your medicines at www.disposemymeds.org.          This list is accurate as of: 3/17/17  2:02 PM.  Always use your most recent med list.                   Brand Name Dispense Instructions for use     acetaminophen-codeine 300-30 MG per tablet    TYLENOL #3    30 tablet    Take 1 tablet by mouth 2 times daily as needed for pain       albuterol 108 (90 BASE) MCG/ACT Inhaler    PROAIR HFA/PROVENTIL HFA/VENTOLIN HFA    3 Inhaler    Inhale 2 puffs into the lungs every 6 hours as needed for shortness of breath / dyspnea       calcium carbonate 500 MG tablet    OS- mg [place]. [place]    180 tablet    Take 1 tablet (500 mg) by mouth 2 times daily       dexlansoprazole 30 MG Cpdr CR capsule    DEXILANT    90 capsule    Take 1 capsule (30 mg) by mouth daily       fluticasone-salmeterol 250-50 MCG/DOSE diskus inhaler    ADVAIR    1 Inhaler    Inhale 1 puff into the lungs 2 times daily       furosemide 20 MG tablet    LASIX    30 tablet    Take 1 tablet (20 mg) by mouth daily as needed       ketotifen 0.025 % Soln ophthalmic solution    ZADITOR    1 Bottle    Place 1 drop into both eyes every 12 hours       order for DME     1 each    Equipment being ordered: Wide walker with brakes and seat.       potassium chloride 10 MEQ tablet    K-TAB,KLOR-CON    30 tablet    Take 1 tablet (10 mEq) by mouth daily as needed for potassium supplementation       sucralfate 1 GM tablet    CARAFATE    40 tablet    Take 1 tablet (1 g) by mouth 4 times daily       vitamin D 2000 UNITS tablet     100 tablet    Take 6000 IU daily for 6 weeks then take 2000 IU daily therafter       zolpidem 10 MG tablet    AMBIEN

## 2017-03-17 NOTE — PROGRESS NOTES
SUBJECTIVE:                                                    Tish De León is a 51 year old female who presents to clinic today for the following health issues:      Concern - Review CT results - done for abdominal pain   Continues to have pain in the right upper quadrant. CAT scan was done yesterday. Results reviewed with the patient no obvious abnormality noted.       Concern - swelling in leg     Complaining of right knee pain. She has significant osteoarthritis. She was seen by orthopedics. Steroid injections have not helped her in the past. They suggested weight loss prior to getting the surgery done. She was given the option to see the orthopedic physician at the end of her stay which she has not planned to do yet.      Patient lives at home. It is hard for her to ambulate. Her daughter brought her in today. She tells that they need help with managing her care at home because it is getting harder.    Asthma Follow-Up    Was ACT completed today?    Yes    ACT Total Scores 3/17/2017   ACT TOTAL SCORE (Goal Greater than or Equal to 20) 11   In the past 12 months, how many times did you visit the emergency room for your asthma without being admitted to the hospital? 0   In the past 12 months, how many times were you hospitalized overnight because of your asthma? 0             Problem list and histories reviewed & adjusted, as indicated.  Additional history: as documented    Patient Active Problem List   Diagnosis     Hyperlipidemia LDL goal <160     Major depressive disorder, single episode, severe (H)     ZOE (obstructive sleep apnea)     BMI 45.0-49.9, adult (H)     Vitamin D deficiency disease     Intertrigo     Obesity     Intermittent asthma     Dependent edema     Knee pain     Acute abdominal pain     Gastroenteritis     GE (gastroenteritis)     Sickle cell trait (H)     Anxiety     Gastritis, Helicobacter pylori     Elbow pain     Esophageal reflux     Morbid obesity, unspecified obesity type (H)      Duodenal ulcer without hemorrhage or perforation and without obstruction     Advanced directives, counseling/discussion     Binge eating disorder     Past Surgical History:   Procedure Laterality Date      SECTION  1993    x4     CHOLECYSTECTOMY  2015     ESOPHAGOSCOPY, GASTROSCOPY, DUODENOSCOPY (EGD), COMBINED  2013    Procedure: COMBINED ESOPHAGOSCOPY, GASTROSCOPY, DUODENOSCOPY (EGD), BIOPSY SINGLE OR MULTIPLE;  COMBINED ESOPHAGOSCOPY, GASTROSCOPY, DUODENOSCOPY (EGD);  Surgeon: Luis Ma MD;  Location:  GI     ESOPHAGOSCOPY, GASTROSCOPY, DUODENOSCOPY (EGD), COMBINED N/A 2016    Procedure: COMBINED ESOPHAGOSCOPY, GASTROSCOPY, DUODENOSCOPY (EGD);  Surgeon: Kirit Hutchinson MD;  Location:  GI     ESOPHAGOSCOPY, GASTROSCOPY, DUODENOSCOPY (EGD), COMBINED N/A 2016    Procedure: COMBINED ESOPHAGOSCOPY, GASTROSCOPY, DUODENOSCOPY (EGD), BIOPSY SINGLE OR MULTIPLE;  Surgeon: Kirit Hutchinson MD;  Location:  GI     SINUS SURGERY  10/2011    Nasal surgery       Social History   Substance Use Topics     Smoking status: Never Smoker     Smokeless tobacco: Never Used     Alcohol use No     Family History   Problem Relation Age of Onset     Hypertension Mother      Depression Sister      Anxiety Disorder Sister      Bipolar Disorder Son      Anxiety Disorder Paternal Aunt      Depression Paternal Aunt          Current Outpatient Prescriptions   Medication Sig Dispense Refill     order for DME Equipment being ordered: Wide walker with brakes and seat. 1 each 0     furosemide (LASIX) 20 MG tablet Take 1 tablet (20 mg) by mouth daily as needed 30 tablet 3     potassium chloride (K-TAB,KLOR-CON) 10 MEQ tablet Take 1 tablet (10 mEq) by mouth daily as needed for potassium supplementation 30 tablet 3     acetaminophen-codeine (TYLENOL #3) 300-30 MG per tablet Take 1 tablet by mouth 2 times daily as needed for pain 30 tablet 0     montelukast (SINGULAIR) 10 MG tablet Take 1 tablet (10 mg) by mouth At  "Bedtime 90 tablet 3     sucralfate (CARAFATE) 1 GM tablet Take 1 tablet (1 g) by mouth 4 times daily 40 tablet 1     dexlansoprazole (DEXILANT) 30 MG CPDR CR capsule Take 1 capsule (30 mg) by mouth daily 90 capsule 1     Cholecalciferol (VITAMIN D) 2000 UNITS tablet Take 6000 IU daily for 6 weeks then take 2000 IU daily therafter 100 tablet 3     fluticasone-salmeterol (ADVAIR) 250-50 MCG/DOSE diskus inhaler Inhale 1 puff into the lungs 2 times daily 1 Inhaler 3     albuterol (PROAIR HFA/PROVENTIL HFA/VENTOLIN HFA) 108 (90 BASE) MCG/ACT Inhaler Inhale 2 puffs into the lungs every 6 hours as needed for shortness of breath / dyspnea 3 Inhaler 1     ketotifen (ZADITOR) 0.025 % SOLN Place 1 drop into both eyes every 12 hours 1 Bottle 11     zolpidem (AMBIEN) 10 MG tablet        calcium carbonate (OS- MG [place]. CA) 500 MG tablet Take 1 tablet (500 mg) by mouth 2 times daily 180 tablet 0     No Known Allergies    Reviewed and updated as needed this visit by clinical staff       Reviewed and updated as needed this visit by Provider         ROS:  C: NEGATIVE for fever, chills, change in weight  INTEGUMENTARY/SKIN: NEGATIVE for worrisome rashes, moles or lesions    OBJECTIVE:                                                    /84 (BP Location: Right arm, Patient Position: Chair, Cuff Size: Adult Large)  Pulse 96  Temp 97.4  F (36.3  C) (Tympanic)  Ht 5' 1\" (1.549 m)  Wt 272 lb (123.4 kg)  SpO2 99%  BMI 51.39 kg/m2  Body mass index is 51.39 kg/(m^2).   GENERAL: healthy, alert, well nourished, well hydrated, no distress  HENT: ear canals- normal; TMs- normal; Nose- normal; Mouth- no ulcers, no lesions  NECK: no tenderness, no adenopathy, no asymmetry, no masses, no stiffness; thyroid- normal to palpation  RESP: lungs clear to auscultation - no rales, no rhonchi, no wheezes  CV: regular rates and rhythm, normal S1 S2, no S3 or S4 and no murmur, no click or rub -  ABDOMEN: soft, no tenderness, no  " hepatosplenomegaly, no masses, normal bowel sounds         ASSESSMENT/PLAN:                                                      1. Pain of right lower leg  Patient has significant osteoarthritis of the right knee. That is the likely cause of the pain in the right leg. Recommended to follow up with orthopedics. She was suggested to see the orthopedic physician at the [facility] as the orthopedist she saw was not comfortable operating on her knee replacement due to her weight  Patient will be seeing an endocrinologist for her weight loss management soon.    Home care referral ordered for assessment of safety and evaluate and manage the needs.  Walker ordered.  Pain medication ordered    - HOME CARE NURSING REFERRAL  - order for DME; Equipment being ordered: Wide walker with brakes and seat.  Dispense: 1 each; Refill: 0  - acetaminophen-codeine (TYLENOL #3) 300-30 MG per tablet; Take 1 tablet by mouth 2 times daily as needed for pain  Dispense: 30 tablet; Refill: 0    2. Swelling of limb    Recommended to use Lasix 20 mg daily as needed for leg swelling. Patient has used that in the past with some benefit. When she uses Lasix and recommended her to take potassium supplement along bed. In 2 weeks, we will recheck the swelling. Salt restriction diet recommended. Recommended to elevate her legs.  - furosemide (LASIX) 20 MG tablet; Take 1 tablet (20 mg) by mouth daily as needed  Dispense: 30 tablet; Refill: 3  - potassium chloride (K-TAB,KLOR-CON) 10 MEQ tablet; Take 1 tablet (10 mEq) by mouth daily as needed for potassium supplementation  Dispense: 30 tablet; Refill: 3    3. Abdominal pain, right upper quadrant  CT scan done yesterday. Results reviewed with the patient. No obvious pathologies noted to explain the discomfort in the right upper quadrant. Patient has a small sliding hiatal hernia. Continue the use of PPI.   Questionable etiology. Recommended to see gastroenterology for further workup.  - GASTROENTEROLOGY  ADULT REF CONSULT ONLY    4. Moderate persistent asthma without complication  Not well controlled. Patient is already on albuterol and Advair. Recommended to use the medications regularly. Adding on Singulair 10 mg daily to the regimen. Side effect profile discussed. Follow up in 2 weeks. patient is not hypoxic.  - montelukast (SINGULAIR) 10 MG tablet; Take 1 tablet (10 mg) by mouth At Bedtime  Dispense: 90 tablet; Refill: 3      Follow up with Provider - 2 week for recheck.     Willi Aguilar MD  [facility]

## 2017-03-17 NOTE — NURSING NOTE
"Chief Complaint   Patient presents with     Results     review CT results     Swelling     right leg from knee to foot       Initial /84 (BP Location: Right arm, Patient Position: Chair, Cuff Size: Adult Large)  Pulse 96  Temp 97.4  F (36.3  C) (Tympanic)  Ht 5' 1\" (1.549 m)  Wt 272 lb (123.4 kg)  SpO2 99%  BMI 51.39 kg/m2 Estimated body mass index is 51.39 kg/(m^2) as calculated from the following:    Height as of this encounter: 5' 1\" (1.549 m).    Weight as of this encounter: 272 lb (123.4 kg).  Medication Reconciliation: complete  "

## 2017-03-18 ASSESSMENT — ASTHMA QUESTIONNAIRES: ACT_TOTALSCORE: 11

## 2017-03-20 ENCOUNTER — TELEPHONE (OUTPATIENT)
Dept: FAMILY MEDICINE | Facility: CLINIC | Age: 52
End: 2017-03-20

## 2017-03-20 NOTE — TELEPHONE ENCOUNTER
Sherita -  HC RN calling for verbal to delay start of care for PT to tomorrow or Wednesday. Verbal given per FM protocol.  Stephanie Asif RN   [facility] - Triage

## 2017-03-23 ENCOUNTER — TELEPHONE (OUTPATIENT)
Dept: FAMILY MEDICINE | Facility: CLINIC | Age: 52
End: 2017-03-23

## 2017-03-23 DIAGNOSIS — M79.661 PAIN OF RIGHT LOWER LEG: ICD-10-CM

## 2017-03-23 NOTE — TELEPHONE ENCOUNTER
Order faxed to [facility] and a fax confirmation was received. Pt was notified.  Padmini Quiroz,

## 2017-03-23 NOTE — TELEPHONE ENCOUNTER
Pt would like to get her walker from [facility]. [facility] needs a DME order for wide walker with brakes and seat. Please reprint the DME rx. TC please fax to [facility] [phone]. Please notify the pt once rx is faxed [phone] ok top leave message. Thank you.  Padmini Quiroz,

## 2017-03-29 ENCOUNTER — OFFICE VISIT (OUTPATIENT)
Dept: ENDOCRINOLOGY | Facility: CLINIC | Age: 52
End: 2017-03-29
Payer: MEDICARE

## 2017-03-29 VITALS
WEIGHT: 267.9 LBS | OXYGEN SATURATION: 97 % | SYSTOLIC BLOOD PRESSURE: 124 MMHG | HEIGHT: 61 IN | DIASTOLIC BLOOD PRESSURE: 84 MMHG | HEART RATE: 92 BPM | TEMPERATURE: 97.4 F | BODY MASS INDEX: 50.58 KG/M2

## 2017-03-29 DIAGNOSIS — R73.03 PREDIABETES: ICD-10-CM

## 2017-03-29 DIAGNOSIS — Z83.49 FAMILY HISTORY OF THYROID DISEASE: ICD-10-CM

## 2017-03-29 DIAGNOSIS — E66.01 MORBID OBESITY, UNSPECIFIED OBESITY TYPE (H): Primary | ICD-10-CM

## 2017-03-29 DIAGNOSIS — R73.09 ABNORMAL GLUCOSE: ICD-10-CM

## 2017-03-29 LAB — HBA1C MFR BLD: 6.2 % (ref 4.3–6)

## 2017-03-29 PROCEDURE — 99214 OFFICE O/P EST MOD 30 MIN: CPT | Performed by: INTERNAL MEDICINE

## 2017-03-29 PROCEDURE — 83036 HEMOGLOBIN GLYCOSYLATED A1C: CPT | Performed by: PATHOLOGY

## 2017-03-29 PROCEDURE — 36415 COLL VENOUS BLD VENIPUNCTURE: CPT | Performed by: INTERNAL MEDICINE

## 2017-03-29 PROCEDURE — 84443 ASSAY THYROID STIM HORMONE: CPT | Performed by: PATHOLOGY

## 2017-03-29 NOTE — PATIENT INSTRUCTIONS
[facility] & [place] locations   Dr Peguero, Endocrinology Department      [facility]   [address] [place] [address]  Appointment [phone]  Fax: [phone]  [place]: Monday and Tuesday         [facility] [address] [place] [address]  Appointment [phone]  Fax: [phone]  [place]: Wednesday and Thursday            Labs today  Follow up with dietician    24 Hour Urine Collection    - During a 24-hour urine collection, follow your usual diet and drink fluids as you ordinarily would.  - Avoid drinking alcohol before and during the urine collection.    - For a 24-hour urine collection, all of the urine that you pass over a 24-hour time period must be collected. You will receive a special container to collect the sample.    The following are directions for collecting a 24-hour urine sample while at home:    In the morning scheduled to begin the urine collection, urinate in the toilet and flush away the first urine you pass. Write down the date and time. That is the start date and time for the collection.    Collect all urine you pass, day and night, for 24 hours. Use the container given to you to collect the urine. Avoid using other containers. The urine sample must include the last urine that you pass 24 hours after starting the collection. Do not allow toilet paper, stool, or anything else to be added to the urine sample.    Write down the date and time that the last sample is collected.    A health  will reach out to you over the telephone within the next 3 business days to answer any questions you may have and set up your first coaching session.  If they are not able to reach you please feel free to call back at the number left on your voicemail.  Thank you and we are excited you are taking advantage of this wonderful opportunity to improve your health!    With this program, patients have an  opportunity to work with a Health , over the phone at no cost for 6 total coaching sessions over a period of seven months.    [facility] use a non-judgmental collaborative approach when working with patients. Coaches support patients in gaining knowledge, skills, tools and confidence to become active participants in their health.  Your  will work with you one-on-one to set small, achievable goals and provide accountability to help you meet your vision.  [facility] have had success supporting patients in the following areas:  * Chronic Disease Management  * Weight Management  * Nutrition  * Exercise  * Stress Management  * Other Health Related Goals

## 2017-03-29 NOTE — NURSING NOTE
"Chief Complaint   Patient presents with     Referral     Dr. Aguilar for obesity        Initial /84 (BP Location: Left arm, Patient Position: Chair, Cuff Size: Adult Large)  Pulse 92  Temp 97.4  F (36.3  C) (Oral)  Ht 1.549 m (5' 1\")  Wt 121.5 kg (267 lb 14.4 oz)  SpO2 97%  BMI 50.62 kg/m2 Estimated body mass index is 50.62 kg/(m^2) as calculated from the following:    Height as of this encounter: 1.549 m (5' 1\").    Weight as of this encounter: 121.5 kg (267 lb 14.4 oz).  Medication Reconciliation: complete     ENDOCRINOLOGY INTAKE FORM    Patient Name:  Tish De León  :  1965    Is patient Diabetic?   No  Does patient have non-diabetic or other endocrine issues?  Yes: obesity     Vitals: /84 (BP Location: Left arm, Patient Position: Chair, Cuff Size: Adult Large)  Pulse 92  Temp 97.4  F (36.3  C) (Oral)  Ht 1.549 m (5' 1\")  Wt 121.5 kg (267 lb 14.4 oz)  SpO2 97%  BMI 50.62 kg/m2  BMI= Body mass index is 50.62 kg/(m^2).    Flu vaccine:  Yes: 16  Pneumonia vaccine:  No    Smoking and Alcohol use:  Social History   Substance Use Topics     Smoking status: Never Smoker     Smokeless tobacco: Never Used     Alcohol use No   OBESITY CONCERNS:  No ref. provider found       Initial Visit Previous Visit Current Change   Weight 267.9      Height 5 1       Waist Circumference         Weight at graduation of high school: 150-160  Diabetes:  No  Sleep Apnea/Snores: Yes: has sleep apnea  Hypertension:  No  Hyperlipidemia:  No  Use of steroids:  YES-shots and lungs   Family history of obesity:  Yes: mom, sister    Diet:  Doesn't eat breakfast, but if she does she eats suasage alatorre, lunch will most likely be fast food   Exercise: No    Staff Signature:  Deann Carbone CMA        "

## 2017-03-29 NOTE — MR AVS SNAPSHOT
After Visit Summary   3/29/2017    Tish De León    MRN: [id number]           Patient Information     Date Of Birth          1965        Visit Information        Provider Department      3/29/2017 12:30 PM Rina Peguero MD [facility]        Today's Diagnoses     Morbid obesity, unspecified obesity type (H)    -  1    BMI 45.0-49.9, adult (H)        Family history of thyroid disease        Prediabetes        Abnormal glucose          Care Instructions    [facility] & [place] locations   Dr Peguero, Endocrinology Department      [facility]   [address] [place] [address]  Appointment [phone]  Fax: [phone]  [place]: Monday and Tuesday         [place] [address] [facility] [place] [address]  Appointment [phone]  Fax: [phone]  [place]: Wednesday and Thursday            Labs today  Follow up with dietician    24 Hour Urine Collection    - During a 24-hour urine collection, follow your usual diet and drink fluids as you ordinarily would.  - Avoid drinking alcohol before and during the urine collection.    - For a 24-hour urine collection, all of the urine that you pass over a 24-hour time period must be collected. You will receive a special container to collect the sample.    The following are directions for collecting a 24-hour urine sample while at home:    In the morning scheduled to begin the urine collection, urinate in the toilet and flush away the first urine you pass. Write down the date and time. That is the start date and time for the collection.    Collect all urine you pass, day and night, for 24 hours. Use the container given to you to collect the urine. Avoid using other containers. The urine sample must include the last urine that you pass 24 hours after starting the collection. Do not allow toilet paper, stool, or anything else to be added to  the urine sample.    Write down the date and time that the last sample is collected.    A health  will reach out to you over the telephone within the next 3 business days to answer any questions you may have and set up your first coaching session.  If they are not able to reach you please feel free to call back at the number left on your voicemail.  Thank you and we are excited you are taking advantage of this wonderful opportunity to improve your health!    With this program, patients have an opportunity to work with a Health , over the phone at no cost for 6 total coaching sessions over a period of seven months.    [facility] use a non-judgmental collaborative approach when working with patients. Coaches support patients in gaining knowledge, skills, tools and confidence to become active participants in their health.  Your  will work with you one-on-one to set small, achievable goals and provide accountability to help you meet your vision.  [facility] have had success supporting patients in the following areas:  * Chronic Disease Management  * Weight Management  * Nutrition  * Exercise  * Stress Management  * Other Health Related Goals          Follow-ups after your visit        Additional Services     HEALTH  REFERRAL       Your provider has referred you to a Health .    A Health  will reach out to the patient within three days, if the following criteria has been met.     Clinic: [facility]    Reason for Referral: Diabetes/Obesity    Patient does have knowledge of this service.    Patient Criteria: Obesity (BMI >= 35kg/m2)/ Hypertension (Blood Pressure >= 140/90)    Provider's Main Focus: Diet/Weight Loss            NUTRITION REFERRAL       Your provider has referred you to: [facility]: [facility] [phone]   [email or web address]    Please be aware that coverage of these services is subject to  "the terms and limitations of your health insurance plan.  Call member services at your health plan with any benefit or coverage questions.      Please bring the following to your appointment:      >>   This referral request   >>   Any documents given to you for this referral  >>   Any specific questions you have about diet or food choices                  Future tests that were ordered for you today     Open Future Orders        Priority Expected Expires Ordered    Cortisol free urine Routine  3/30/2018 3/29/2017            Who to contact     If you have questions or need follow up information about today's clinic visit or your schedule please contact [facility] directly at [phone].  Normal or non-critical lab and imaging results will be communicated to you by Vitasolhart, letter or phone within 4 business days after the clinic has received the results. If you do not hear from us within 7 days, please contact the clinic through Vitasolhart or phone. If you have a critical or abnormal lab result, we will notify you by phone as soon as possible.  Submit refill requests through Stroho or call your pharmacy and they will forward the refill request to us. Please allow 3 business days for your refill to be completed.          Additional Information About Your Visit        MyChart Information     Stroho lets you send messages to your doctor, view your test results, renew your prescriptions, schedule appointments and more. To sign up, go to [email or web address] . Click on \"Log in\" on the left side of the screen, which will take you to the Welcome page. Then click on \"Sign up Now\" on the right side of the page.     You will be asked to enter the access code listed below, as well as some personal information. Please follow the directions to create your username and password.     Your access code is: [email or web address]  Expires: 2017  3:28 PM     Your access code will  in 90 days. If you need help or a " "new code, please call your [facility] clinic or [phone].        Care EveryWhere ID     This is your Care EveryWhere ID. This could be used by other organizations to access your [facility] medical records  [id number]        Your Vitals Were     Pulse Temperature Height Pulse Oximetry BMI (Body Mass Index)       92 97.4  F (36.3  C) (Oral) 5' 1\" (1.549 m) 97% 50.62 kg/m2        Blood Pressure from Last 3 Encounters:   03/29/17 124/84   03/17/17 126/84   03/09/17 131/85    Weight from Last 3 Encounters:   03/29/17 267 lb 14.4 oz (121.5 kg)   03/17/17 272 lb (123.4 kg)   02/21/17 266 lb (120.7 kg)              We Performed the Following     HEALTH  REFERRAL     Hemoglobin A1c     NUTRITION REFERRAL     TSH with free T4 reflex        Primary Care Provider Office Phone # Fax #    Willi Aguilar [phone]       [facility] [place] [address] [place] [address] [address]  [place] [address]        Thank you!     Thank you for choosing [facility]  for your care. Our goal is always to provide you with excellent care. Hearing back from our patients is one way we can continue to improve our services. Please take a few minutes to complete the written survey that you may receive in the mail after your visit with us. Thank you!             Your Updated Medication List - Protect others around you: Learn how to safely use, store and throw away your medicines at www.disposemymeds.org.          This list is accurate as of: 3/29/17  1:57 PM.  Always use your most recent med list.                   Brand Name Dispense Instructions for use    acetaminophen-codeine 300-30 MG per tablet    TYLENOL #3    30 tablet    Take 1 tablet by mouth 2 times daily as needed for pain       albuterol 108 (90 BASE) MCG/ACT Inhaler    PROAIR HFA/PROVENTIL HFA/VENTOLIN HFA    3 Inhaler    Inhale 2 puffs into the lungs every 6 hours as needed for shortness of breath / dyspnea       calcium carbonate 500 MG tablet    " OS- mg [place]. Ca    180 tablet    Take 1 tablet (500 mg) by mouth 2 times daily       dexlansoprazole 30 MG Cpdr CR capsule    DEXILANT    90 capsule    Take 1 capsule (30 mg) by mouth daily       fluticasone-salmeterol 250-50 MCG/DOSE diskus inhaler    ADVAIR    1 Inhaler    Inhale 1 puff into the lungs 2 times daily       furosemide 20 MG tablet    LASIX    30 tablet    Take 1 tablet (20 mg) by mouth daily as needed       ketotifen 0.025 % Soln ophthalmic solution    ZADITOR    1 Bottle    Place 1 drop into both eyes every 12 hours       montelukast 10 MG tablet    SINGULAIR    90 tablet    Take 1 tablet (10 mg) by mouth At Bedtime       order for DME     1 each    Equipment being ordered: Wide walker with brakes and seat.       potassium chloride 10 MEQ tablet    K-TAB,KLOR-CON    30 tablet    Take 1 tablet (10 mEq) by mouth daily as needed for potassium supplementation       sucralfate 1 GM tablet    CARAFATE    40 tablet    Take 1 tablet (1 g) by mouth 4 times daily       vitamin D 2000 UNITS tablet     100 tablet    Take 6000 IU daily for 6 weeks then take 2000 IU daily therafter       zolpidem 10 MG tablet    AMBIEN

## 2017-03-29 NOTE — LETTER
March 30, 2017      Tish De León  [address] [address]              Dear Tish De León,      Labs done with endocrinology showed A1c is 6.2% (prediabetes range0)  Thyroid function is in normal range.    Follow up as discussed in last clinic visit.  We will discuss treatment for prediabetes at that time.    Please call endocrinology clinic (nurse line: [phone]) if questions.    Please send a letter with above lab/test results and above comments.    Thank you.    Rina Peguero               Results for orders placed or performed in visit on 03/29/17   Hemoglobin A1c   Result Value Ref Range    Hemoglobin A1C 6.2 (H) 4.3 - 6.0 %   TSH with free T4 reflex   Result Value Ref Range    TSH 0.45 0.40 - 4.00 mU/L         Your [facility] Care Team

## 2017-03-29 NOTE — PROGRESS NOTES
Name: Tish De León  Seen in consultation at the request of  found for obesity.  HPI:  Tish De León is a 51 year old female who presents for the evaluation of obestiy.           Initial visit     Previous visit       Current   weight        BMI      Waist circumference        Wt Readings from Last 5 Encounters:   03/29/17 121.5 kg (267 lb 14.4 oz)   03/17/17 123.4 kg (272 lb)   02/21/17 120.7 kg (266 lb)   01/05/17 118.8 kg (262 lb)   09/28/16 121.1 kg (267 lb)       2012: 246 lbs  2013: 230  2014: 251  2016: 260-270    Growing up normal weight.  H/o steroid injections (several) for knee pain and plantar facitis. Was on PO steroid in past for months for knee pain ??  About 3-4 years back this started  Before that 220s  Plan for knee replacement.  Last steroid injection was few months back  Also h/o asthma- was on and off steroids for exacerbations  4 kids- went back to prepregnancy wt after each delivery    Stopped drinking pop    Menses: s/p menopause. LMP 6-8 months back  Diarrhea/Constipation:No  Changes in Hair or Skin:No  Diabetes:No  Sleep: sleep apnea- on CPAP  Hypertension:No  Hyperlipidemia:No  Hirsutism:few on chin  Easy Brusing:Yes: thinks yes  Use of Steroids:as above  Family history of Obesity:Yes: sister- obese. Mother- obese. Father- skinny  Diet: trying to cut down on pop. Tried wt watchers and other diets- did not work for her  Exercise:No. Knee pain  PMH/PSH:  Past Medical History:   Diagnosis Date     Acute abdominal pain 8/14/2012     Anxiety 8/21/2012     Arthritis      Asthma      Cardiomyopathy, peripartum, postpartum 1993     CHF (congestive heart failure) (H)     normal echo 2016     Degenerative disc disease, thoracic      Dependent edema 8/10/2012     Depressive disorder      GERD (gastroesophageal reflux disease)      Hyperlipidemia      Obesity      Sickle cell trait (H) 8/21/2012     Sleep apnea     CPAP     Ulcer (H)      Past Surgical History:   Procedure Laterality Date       SECTION  1993    x4     CHOLECYSTECTOMY  2015     ESOPHAGOSCOPY, GASTROSCOPY, DUODENOSCOPY (EGD), COMBINED  2013    Procedure: COMBINED ESOPHAGOSCOPY, GASTROSCOPY, DUODENOSCOPY (EGD), BIOPSY SINGLE OR MULTIPLE;  COMBINED ESOPHAGOSCOPY, GASTROSCOPY, DUODENOSCOPY (EGD);  Surgeon: Luis Ma MD;  Location:  GI     ESOPHAGOSCOPY, GASTROSCOPY, DUODENOSCOPY (EGD), COMBINED N/A 2016    Procedure: COMBINED ESOPHAGOSCOPY, GASTROSCOPY, DUODENOSCOPY (EGD);  Surgeon: Kirit Hutchinson MD;  Location:  GI     ESOPHAGOSCOPY, GASTROSCOPY, DUODENOSCOPY (EGD), COMBINED N/A 2016    Procedure: COMBINED ESOPHAGOSCOPY, GASTROSCOPY, DUODENOSCOPY (EGD), BIOPSY SINGLE OR MULTIPLE;  Surgeon: Kirit Hutchinson MD;  Location:  GI     SINUS SURGERY  10/2011    Nasal surgery     Family Hx:  Family History   Problem Relation Age of Onset     Bipolar Disorder Son      Hypertension Mother      Depression Sister      Anxiety Disorder Sister      Anxiety Disorder Paternal Aunt      Depression Paternal Aunt      Thyroid disease: Yes: sister         DM2: Yes: sister           Social Hx:  Social History     Social History     Marital status: Single     Spouse name: seperated     Number of children: 4     Years of education: N/A     Occupational History     disabled      Social History Main Topics     Smoking status: Never Smoker     Smokeless tobacco: Never Used     Alcohol use No     Drug use: No     Sexual activity: Yes     Other Topics Concern     Not on file     Social History Narrative          MEDICATIONS:  has a current medication list which includes the following prescription(s): order for dme, furosemide, potassium chloride, acetaminophen-codeine, montelukast, sucralfate, dexlansoprazole, vitamin d, fluticasone-salmeterol, albuterol, ketotifen, zolpidem, and calcium carbonate.    ROS     ROS: 10 point ROS neg other than the symptoms noted above in the HPI.    Physical Exam   VS: /84 (BP Location:  "Left arm, Patient Position: Chair, Cuff Size: Adult Large)  Pulse 92  Temp 97.4  F (36.3  C) (Oral)  Ht 1.549 m (5' 1\")  Wt 121.5 kg (267 lb 14.4 oz)  SpO2 97%  BMI 50.62 kg/m2     GENERAL: AXOX3, NAD, well dressed, answering questions appropriately, appears stated age.  HEENT: OP clear, no LAD, no TM, non-tender, no exopthalmous, no proptosis, EOMI, no lig lag, no retraction  NECK: Thyroid normal in size, non tender, no nodules were palpated.  CV: RRR, no rubs, gallops, no murmurs  LUNGS: CTAB, no wheezes, rales, or ronchi  ABDOMEN: +BS, no stretch marks  EXTREMITIES: no edema, +pulses, no rashes, no lesions  NEUROLOGY: CN grossly intact, + DTR upper and lower extremity, no tremors  MSK: grossly intact  SKIN: no rashes, no lesions    LABS:  Last Basic Metabolic Panel:  Lab Results   Component Value Date     09/27/2016      Lab Results   Component Value Date    POTASSIUM 4.1 09/27/2016     Lab Results   Component Value Date    CHLORIDE 105 09/27/2016     Lab Results   Component Value Date    PAZ 8.9 09/27/2016     Lab Results   Component Value Date    CO2 26 09/27/2016     Lab Results   Component Value Date    BUN 8 09/27/2016     Lab Results   Component Value Date    CR 0.75 09/27/2016     Lab Results   Component Value Date     09/27/2016       Lab Results   Component Value Date    TSH 1.09 03/15/2016     Lab Results   Component Value Date    A1C 6.1 03/22/2016         All pertinent notes, labs, and images personally reviewed by me.     A/P  Ms.Valarie MALENA De León is a 51 year old here for the evaluation of obestiy:    1. Obesity-   Body mass index is 50.62 kg/(m^2).   Obesity is associated with a significant increase in mortality and risk of many disorders, including diabetes mellitus, hypertension, dyslipidemia, heart disease, stroke, sleep apnea, cancer, and many others. Conversely, weight loss is associated with a reduction in obesity-associated morbidity.  Endocrine evaluation of obesity " includes Cushing's and thyroid dysfunction.  Will obtain TSH and freeT4 along with 24 hour urine collection.   Discussed bariatric surgery- she is not ready  The patient is advised to Make better food choices: reduce carbs, Reduce portion size, weight loss and exercise 3-4 times a week.  Plan: Cortisol free urine, NUTRITION REFERRAL, HEALTH         REFERRAL      TSH with free T4 reflex          (R73.03) Prediabetes  Comment: not on medication. A1c 6.1% in 3/2016.  Plan: Hemoglobin A1c           Discussed:      Obesity is a biological preventable and treatable disease, which is associated with significantly increased risk of many acute and chronic health conditions. Obesity has now been recognized as a chronic disease by the American Medical Association.    A range of serious co-morbidities are associated with obesity including increased risk for hypertension, stroke, coronary artery disease, dyslipidemia, Type II diabetes, depression, sleep apnea, cancers of the colon, breast and endometrium, osteoarthritis and female infertility. Therefore, obesity is not just a problem; it s a disease that warrants serious evidence based treatements.    I explained to the patient relevant work up for the diagnosis and management of obesity and discussed treatment options. Body Mass Index (BMI) has been a standard means for calculating risk for overweight and obesity. The new American Association of Clinical Endocrinology (AACE) algorithm recommends lifestyle modifications as the initial phase of treatment for all patients with the BMI equal or greater than 25 kg/m2. Lifestyle modifications includes use of medical nutrition therapy, exercise, tobacco cessation, adequate quality and quantity of sleep, limited consumption of alcohol and reduced stress through implementation of a structured, multidisciplinary program.    In patients with complications associated with obesity, graded interventions are recommended including  pharmacological therapy such as phentermine, orlistat, lorcaserin and phentermine/topiramate ER, contrave ( buproprion/naltreone) and the use of very low calorie meal replacement programs.    If medical intervention is insufficient, surgical therapy may be considered, especially in patients with BMI greater than or equal to 35 kg/m2 with multiple complications. Surgical treatments include lap-band, gastric sleeve or gastric bypass surgery.      More than 50% of the time spent with Ms. De León on counseling / coordinating her care.      Follow-up:  6 weeks    Rina Peguero MD  Endocrinology   [facility]    CC: Willi Aguilar    Addendum to above note and clinic visit:    Labs reviewed.    See result note/telephone encounter.

## 2017-03-30 ENCOUNTER — TELEPHONE (OUTPATIENT)
Dept: NURSING | Facility: CLINIC | Age: 52
End: 2017-03-30

## 2017-03-30 LAB — TSH SERPL DL<=0.005 MIU/L-ACNC: 0.45 MU/L (ref 0.4–4)

## 2017-03-30 NOTE — PROGRESS NOTES
Tish    Labs done with endocrinology showed A1c is 6.2% (prediabetes range0)  Thyroid function is in normal range.    Follow up as discussed in last clinic visit.  We will discuss treatment for prediabetes at that time.    Please call endocrinology clinic (nurse line: [phone]) if questions.    Please send a letter with above lab/test results and above comments.    Thank you.    Rina Peguero

## 2017-03-31 ENCOUNTER — TELEPHONE (OUTPATIENT)
Dept: PALLIATIVE MEDICINE | Facility: CLINIC | Age: 52
End: 2017-03-31

## 2017-03-31 ENCOUNTER — TELEPHONE (OUTPATIENT)
Dept: FAMILY MEDICINE | Facility: CLINIC | Age: 52
End: 2017-03-31

## 2017-03-31 ENCOUNTER — HOSPITAL ENCOUNTER (EMERGENCY)
Facility: CLINIC | Age: 52
Discharge: HOME | End: 2017-03-31
Attending: EMERGENCY MEDICINE | Admitting: EMERGENCY MEDICINE
Payer: MEDICARE

## 2017-03-31 VITALS
WEIGHT: 260 LBS | BODY MASS INDEX: 49.09 KG/M2 | OXYGEN SATURATION: 98 % | SYSTOLIC BLOOD PRESSURE: 143 MMHG | HEIGHT: 61 IN | TEMPERATURE: 99 F | DIASTOLIC BLOOD PRESSURE: 68 MMHG | RESPIRATION RATE: 18 BRPM | HEART RATE: 78 BPM

## 2017-03-31 DIAGNOSIS — R10.13 ABDOMINAL PAIN, EPIGASTRIC: ICD-10-CM

## 2017-03-31 DIAGNOSIS — M79.661 PAIN OF RIGHT LOWER LEG: ICD-10-CM

## 2017-03-31 DIAGNOSIS — K29.00 ACUTE GASTRITIS WITHOUT HEMORRHAGE, UNSPECIFIED GASTRITIS TYPE: ICD-10-CM

## 2017-03-31 LAB
ALBUMIN SERPL-MCNC: 3.5 G/DL (ref 3.4–5)
ALP SERPL-CCNC: 96 U/L (ref 40–150)
ALT SERPL W P-5'-P-CCNC: 25 U/L (ref 0–50)
ANION GAP SERPL CALCULATED.3IONS-SCNC: 12 MMOL/L (ref 3–14)
AST SERPL W P-5'-P-CCNC: 22 U/L (ref 0–45)
BASOPHILS # BLD AUTO: 0 10E9/L (ref 0–0.2)
BASOPHILS NFR BLD AUTO: 0.1 %
BILIRUB SERPL-MCNC: 0.2 MG/DL (ref 0.2–1.3)
BUN SERPL-MCNC: 12 MG/DL (ref 7–30)
CALCIUM SERPL-MCNC: 8.9 MG/DL (ref 8.5–10.1)
CHLORIDE SERPL-SCNC: 108 MMOL/L (ref 94–109)
CO2 SERPL-SCNC: 22 MMOL/L (ref 20–32)
CREAT SERPL-MCNC: 0.8 MG/DL (ref 0.52–1.04)
DIFFERENTIAL METHOD BLD: NORMAL
DIFFERENTIAL METHOD BLD: NORMAL
EOSINOPHIL # BLD AUTO: 0.2 10E9/L (ref 0–0.7)
EOSINOPHIL NFR BLD AUTO: 2.1 %
ERYTHROCYTE [DISTWIDTH] IN BLOOD BY AUTOMATED COUNT: 12.8 % (ref 10–15)
ERYTHROCYTE [DISTWIDTH] IN BLOOD BY AUTOMATED COUNT: NORMAL % (ref 10–15)
GFR SERPL CREATININE-BSD FRML MDRD: 75 ML/MIN/1.7M2
GLUCOSE SERPL-MCNC: 143 MG/DL (ref 70–99)
HCT VFR BLD AUTO: 36.4 % (ref 35–47)
HCT VFR BLD AUTO: NORMAL % (ref 35–47)
HGB BLD-MCNC: 12.7 G/DL (ref 11.7–15.7)
HGB BLD-MCNC: NORMAL G/DL (ref 11.7–15.7)
IMM GRANULOCYTES # BLD: 0 10E9/L (ref 0–0.4)
IMM GRANULOCYTES NFR BLD: 0.3 %
LIPASE SERPL-CCNC: 126 U/L (ref 73–393)
LYMPHOCYTES # BLD AUTO: 1.3 10E9/L (ref 0.8–5.3)
LYMPHOCYTES NFR BLD AUTO: 16.6 %
MCH RBC QN AUTO: 30.4 PG (ref 26.5–33)
MCH RBC QN AUTO: NORMAL PG (ref 26.5–33)
MCHC RBC AUTO-ENTMCNC: 34.9 G/DL (ref 31.5–36.5)
MCHC RBC AUTO-ENTMCNC: NORMAL G/DL (ref 31.5–36.5)
MCV RBC AUTO: 87 FL (ref 78–100)
MCV RBC AUTO: NORMAL FL (ref 78–100)
MONOCYTES # BLD AUTO: 0.4 10E9/L (ref 0–1.3)
MONOCYTES NFR BLD AUTO: 5 %
NEUTROPHILS # BLD AUTO: 6.1 10E9/L (ref 1.6–8.3)
NEUTROPHILS NFR BLD AUTO: 75.9 %
NRBC # BLD AUTO: 0 10*3/UL
NRBC BLD AUTO-RTO: 0 /100
PLATELET # BLD AUTO: 278 10E9/L (ref 150–450)
PLATELET # BLD AUTO: NORMAL 10E9/L (ref 150–450)
POTASSIUM SERPL-SCNC: 4.3 MMOL/L (ref 3.4–5.3)
PROT SERPL-MCNC: 8 G/DL (ref 6.8–8.8)
RBC # BLD AUTO: 4.18 10E12/L (ref 3.8–5.2)
RBC # BLD AUTO: NORMAL 10E12/L (ref 3.8–5.2)
SODIUM SERPL-SCNC: 142 MMOL/L (ref 133–144)
WBC # BLD AUTO: 7.9 10E9/L (ref 4–11)
WBC # BLD AUTO: NORMAL 10E9/L (ref 4–11)

## 2017-03-31 PROCEDURE — 96361 HYDRATE IV INFUSION ADD-ON: CPT

## 2017-03-31 PROCEDURE — 99285 EMERGENCY DEPT VISIT HI MDM: CPT | Mod: 25

## 2017-03-31 PROCEDURE — 25000125 ZZHC RX 250: Performed by: EMERGENCY MEDICINE

## 2017-03-31 PROCEDURE — 25000128 H RX IP 250 OP 636: Performed by: EMERGENCY MEDICINE

## 2017-03-31 PROCEDURE — 83690 ASSAY OF LIPASE: CPT | Performed by: EMERGENCY MEDICINE

## 2017-03-31 PROCEDURE — A9270 NON-COVERED ITEM OR SERVICE: HCPCS | Mod: GY | Performed by: EMERGENCY MEDICINE

## 2017-03-31 PROCEDURE — 25000132 ZZH RX MED GY IP 250 OP 250 PS 637: Mod: GY | Performed by: EMERGENCY MEDICINE

## 2017-03-31 PROCEDURE — 85025 COMPLETE CBC W/AUTO DIFF WBC: CPT | Performed by: EMERGENCY MEDICINE

## 2017-03-31 PROCEDURE — 96374 THER/PROPH/DIAG INJ IV PUSH: CPT

## 2017-03-31 PROCEDURE — 96375 TX/PRO/DX INJ NEW DRUG ADDON: CPT

## 2017-03-31 PROCEDURE — 80053 COMPREHEN METABOLIC PANEL: CPT | Performed by: EMERGENCY MEDICINE

## 2017-03-31 PROCEDURE — 36415 COLL VENOUS BLD VENIPUNCTURE: CPT | Performed by: EMERGENCY MEDICINE

## 2017-03-31 RX ORDER — MORPHINE SULFATE 4 MG/ML
4 INJECTION, SOLUTION INTRAMUSCULAR; INTRAVENOUS ONCE
Status: COMPLETED | OUTPATIENT
Start: 2017-03-31 | End: 2017-03-31

## 2017-03-31 RX ORDER — HYDROCODONE BITARTRATE AND ACETAMINOPHEN 5; 325 MG/1; MG/1
1-2 TABLET ORAL EVERY 4 HOURS PRN
Qty: 10 TABLET | Refills: 0 | Status: SHIPPED | OUTPATIENT
Start: 2017-03-31 | End: 2017-04-06

## 2017-03-31 RX ORDER — ONDANSETRON 4 MG/1
4 TABLET, ORALLY DISINTEGRATING ORAL EVERY 6 HOURS PRN
Qty: 6 TABLET | Refills: 0 | Status: SHIPPED | OUTPATIENT
Start: 2017-03-31 | End: 2017-04-21

## 2017-03-31 RX ORDER — ONDANSETRON 2 MG/ML
4 INJECTION INTRAMUSCULAR; INTRAVENOUS ONCE
Status: COMPLETED | OUTPATIENT
Start: 2017-03-31 | End: 2017-03-31

## 2017-03-31 RX ADMIN — SODIUM CHLORIDE 1000 ML: 9 INJECTION, SOLUTION INTRAVENOUS at 19:00

## 2017-03-31 RX ADMIN — LIDOCAINE HYDROCHLORIDE 30 ML: 20 SOLUTION ORAL; TOPICAL at 19:37

## 2017-03-31 RX ADMIN — MORPHINE SULFATE 4 MG: 4 INJECTION, SOLUTION INTRAMUSCULAR; INTRAVENOUS at 20:23

## 2017-03-31 RX ADMIN — ONDANSETRON 4 MG: 2 SOLUTION INTRAMUSCULAR; INTRAVENOUS at 21:14

## 2017-03-31 ASSESSMENT — ENCOUNTER SYMPTOMS
ABDOMINAL DISTENTION: 1
NAUSEA: 0
APPETITE CHANGE: 1
DIARRHEA: 0
CONSTIPATION: 1
ABDOMINAL PAIN: 1
VOMITING: 0
FEVER: 0

## 2017-03-31 ASSESSMENT — PAIN DESCRIPTION - DESCRIPTORS: DESCRIPTORS: ACHING

## 2017-03-31 NOTE — TELEPHONE ENCOUNTER
Same RX ordered for now without any changes.   Referring her to see pain management for evaluation and management of pain.   Notify the patient.     Willi Aguilar MD  [facility], [place]

## 2017-03-31 NOTE — ED AVS SNAPSHOT
Emergency Department    [address] [facility] [address]    Phone:  [phone]    Fax:  [phone]                                       Tish De León   MRN: [id number]    Department:   Emergency Department   Date of Visit:  3/31/2017           After Visit Summary Signature Page     I have received my discharge instructions, and my questions have been answered. I have discussed any challenges I see with this plan with the nurse or doctor.    ..........................................................................................................................................  Patient/Patient Representative Signature      ..........................................................................................................................................  Patient Representative Print Name and Relationship to Patient    ..................................................               ................................................  Date                                            Time    ..........................................................................................................................................  Reviewed by Signature/Title    ...................................................              ..............................................  Date                                                            Time

## 2017-03-31 NOTE — TELEPHONE ENCOUNTER
[place], [facility] [phone]    OT two times a week for three weeks,     for cognitive testing, bathroom equipment and transfers, ADL's and fall prevention    Approval given to fax orders for grey [place] RN

## 2017-03-31 NOTE — TELEPHONE ENCOUNTER
Called pt and gave her the information below. Appt already scheduled for May 26 at 1:00 at  Pain Management. Rx delivered to  EP pharmacy  Padmini Quiroz,

## 2017-03-31 NOTE — LETTER
March 31, 2017    Tish De León  [address] [facility] [address] [address]    Dear Tish,               Welcome to the [facility].  We are located on the 2nd floor ([address]) of the [facility], located at [address] [place], [place] [address], [place] [address].    Your appointment at the [facility] has been scheduled on May 26 at 1:00pm with Polo Patel MD.    At your first visit, you will meet your team of caregivers who will help you to develop pain management strategies that will last a lifetime. You will meet with our support staff to review your insurance information, and collect your co-payment if required by your insurance company. You will also meet with a medical pain specialist and care coordinator who will assess your pain and develop a plan of care for your successful pain rehabilitation. You should expect to spend 1-2 hours at your first visit with us. Usually, patients work with us for a period of 6-12 months, and eventually return to their primary doctor once their pain management has stabilized.      To help us make your visit go as smoothly as possible, please bring the following items with you on your visit:     Completed Pain Questionnaire enclosed in this packet.  If you do not bring the completed questionnaire, we may have to reschedule your appointment.  List of any medicines that you are currently taking or have been prescribed  Important NON-[facility] medical information such as medical records or tests results (X-rays, or laboratory tests)  Your health insurance card  Financial resources to cover your co-payment or balance due at the time of service (cash, personal check, Visa, and MasterCard are acceptable methods of payment)     Due to the demand for new patient evaluations, you must notify the scheduling department 48 hours in advance if you are not able to keep this appointment. Failure to do so could affect your ability to reschedule with  our clinic. Please be aware that we will not prescribe any medications at your first visit.     Please call [phone] with any questions regarding your appointment. We look forward to meeting you and working to address your health care needs.     Sincerely,      [facility]

## 2017-03-31 NOTE — TELEPHONE ENCOUNTER
Pain Management Center Referral      1. Confirmed address with patient? Yes  2. Confirmed phone number with patient? Yes  3. Confirmed referring provider? Yes  4. Is the PCP the same as the referring provider? Yes  5. Has the patient been to any previous pain clinics? Yes [facility]  (If yes, send CRISTIAN with welcome letter)  6. Which insurance are we to bill for this appointment?  Medicare    7. Informed pt of cancellation (48 hour) policy? Yes    REGARDING OPIOID MEDICATIONS: We will always address appropriateness of opioid pain medications, but we generally will not automatically take on a prescribing role. When we do take on prescribing of opioids for chronic pain, it is in collaboration with the referring physician for an intermediate period of time (months), with an expectation that the primary physician or provider will assume the prescribing role if medications are effective at stable doses with demonstrated compliance. Therefore, please do not assume that your prescribing responsibilities end on the day of pain clinic consultation.  7. Informed pt of prescribing policy? Yes      8. Referring Provider: Willi Aguilar

## 2017-03-31 NOTE — ED AVS SNAPSHOT
Emergency Department    [address] [facility] [address]    Phone:  [phone]    Fax:  [phone]                                       Tish De Lóen   MRN: [id number]    Department:   Emergency Department   Date of Visit:  3/31/2017           Patient Information     Date Of Birth          1965        Your diagnoses for this visit were:     Abdominal pain, epigastric     Acute gastritis without hemorrhage, unspecified gastritis type        You were seen by Josy Roberto MD.      Follow-up Information     Schedule an appointment as soon as possible for a visit with Willi Aguilar MD.    Specialty:  Family Practice    Contact information:    [facility]  [address] [place] [address] DR  [place] [address]  [phone]          Discharge Instructions       Push fluids, Prilosec 2 times a day, liquid Maalox during the day 3-4 times as needed.  Norco for pain as needed.  Zofran as needed for nausea.  Recheck in the clinic early in the week, return to the ER if you have bloody vomiting or stools.      Opioid Medication Information  You have been given a prescription for an opioid (narcotic) pain medicine and/or have received a pain medicine while here in the emergency department. These medicines can make you drowsy or impaired. You must not drive, operate dangerous equipment, or engage in any other dangerous activities while taking these medications. If you drive while taking these medications, you could be arrested for DUI, or driving under the influence. Do not drink any alcohol while you are taking these medications.   Opioid pain medications can cause addiction. If you have a history of chemical dependency of any type, you are at a higher risk of becoming addicted to pain medications. Only take these prescribed medications to treat your pain when all other options have been tried. Take it for as short a time and as few doses as possible. Store your pain pills in a  secure place, as they are frequently stolen and provide a dangerous opportunity for children or visitors in your house to start abusing these powerful medications. We will not replace any lost or stolen medicine. As soon as your pain is better, you should flush all your remaining medication.   Many prescription pain medications contain Tylenol (acetaminophen), including Vicodin, Tylenol #3, Norco, Lortab, and Percocet. You should not take any extra pills of Tylenol if you are using these prescription medications or you can get very sick. Do not ever take more than 4000 mg of acetaminophen in any 24 hour period.  All opioids tend to cause constipation. Drink plenty of water and eat foods that have a lot of fiber, such as fruits, vegetables, prune juice, apple juice and high fiber cereal. Take a laxative if you don t move your bowels at least every other day. Miralax, Milk of Magnesia, Colace, or Senna can be used to keep you regular.       Discharge References/Attachments     GASTRITIS OR ULCER (NO ANTIBIOTIC TREATMENT) (ENGLISH)      Future Appointments        Provider Department Dept Phone Center    4/3/2017 1:00 PM Willi Aguilar MD [facility] [phone]     5/26/2017 1:00 PM Polo Patel MD [facility] [phone]  PAIN [facility]LAI      24 Hour Appointment Hotline       To make an appointment at any [facility], call [phone] ([phone]). If you don't have a family doctor or clinic, we will help you find one. [facility] are conveniently located to serve the needs of you and your family.             Review of your medicines      START taking        Dose / Directions Last dose taken    HYDROcodone-acetaminophen 5-325 MG per tablet   Commonly known as:  NORCO   Dose:  1-2 tablet   Quantity:  10 tablet        Take 1-2 tablets by mouth every 4 hours as needed for moderate to severe pain   Refills:  0        omeprazole 20 MG CR capsule   Commonly known as:   priLOSEC   Dose:  20 mg   Quantity:  20 capsule        Take 1 capsule (20 mg) by mouth 2 times daily   Refills:  0        ondansetron 4 MG ODT tab   Commonly known as:  ZOFRAN ODT   Dose:  4 mg   Quantity:  6 tablet        Take 1 tablet (4 mg) by mouth every 6 hours as needed for nausea   Refills:  0          Our records show that you are taking the medicines listed below. If these are incorrect, please call your family doctor or clinic.        Dose / Directions Last dose taken    acetaminophen-codeine 300-30 MG per tablet   Commonly known as:  TYLENOL #3   Dose:  1 tablet   Quantity:  30 tablet        Take 1 tablet by mouth 2 times daily as needed for pain   Refills:  0        albuterol 108 (90 BASE) MCG/ACT Inhaler   Commonly known as:  PROAIR HFA/PROVENTIL HFA/VENTOLIN HFA   Dose:  2 puff   Quantity:  3 Inhaler        Inhale 2 puffs into the lungs every 6 hours as needed for shortness of breath / dyspnea   Refills:  1        calcium carbonate 500 MG tablet   Commonly known as:  OS- mg [place]. Ca   Dose:  500 mg   Quantity:  180 tablet        Take 1 tablet (500 mg) by mouth 2 times daily   Refills:  0        dexlansoprazole 30 MG Cpdr CR capsule   Commonly known as:  DEXILANT   Dose:  30 mg   Quantity:  90 capsule        Take 1 capsule (30 mg) by mouth daily   Refills:  1        fluticasone-salmeterol 250-50 MCG/DOSE diskus inhaler   Commonly known as:  ADVAIR   Dose:  1 puff   Quantity:  1 Inhaler        Inhale 1 puff into the lungs 2 times daily   Refills:  3        furosemide 20 MG tablet   Commonly known as:  LASIX   Dose:  20 mg   Quantity:  30 tablet        Take 1 tablet (20 mg) by mouth daily as needed   Refills:  3        ketotifen 0.025 % Soln ophthalmic solution   Commonly known as:  ZADITOR   Dose:  1 drop   Quantity:  1 Bottle        Place 1 drop into both eyes every 12 hours   Refills:  11        montelukast 10 MG tablet   Commonly known as:  SINGULAIR   Dose:  10 mg   Quantity:  90 tablet         Take 1 tablet (10 mg) by mouth At Bedtime   Refills:  3        order for DME   Quantity:  1 each        Equipment being ordered: Wide walker with brakes and seat.   Refills:  0        potassium chloride 10 MEQ tablet   Commonly known as:  K-TAB,KLOR-CON   Dose:  10 mEq   Quantity:  30 tablet        Take 1 tablet (10 mEq) by mouth daily as needed for potassium supplementation   Refills:  3        sucralfate 1 GM tablet   Commonly known as:  CARAFATE   Dose:  1 g   Quantity:  40 tablet        Take 1 tablet (1 g) by mouth 4 times daily   Refills:  1        vitamin D 2000 UNITS tablet   Quantity:  100 tablet        Take 6000 IU daily for 6 weeks then take 2000 IU daily therafter   Refills:  3        zolpidem 10 MG tablet   Commonly known as:  AMBIEN        Refills:  0                Prescriptions were sent or printed at these locations (3 Prescriptions)                   Other Prescriptions                Printed at Department/Unit printer (3 of 3)         HYDROcodone-acetaminophen (NORCO) 5-325 MG per tablet               ondansetron (ZOFRAN ODT) 4 MG ODT tab               omeprazole (PRILOSEC) 20 MG CR capsule                Procedures and tests performed during your visit     CBC with platelets + differential    CBC with platelets differential    Comprehensive metabolic panel    Lipase      Orders Needing Specimen Collection     None      Pending Results     No orders found from 3/29/2017 to 4/1/2017.            Pending Culture Results     No orders found from 3/29/2017 to 4/1/2017.             Test Results from your hospital stay     3/31/2017  7:02 PM - Interface, innRoad Results      Component Results     Component Value Ref Range & Units Status    WBC  4.0 - 11.0 10e9/L Final    Canceled, Test credited   Unsatisfactory specimen - clotted  ENTERED ONTO ED TRACKBOARD AT 1902      RBC Count  3.8 - 5.2 10e12/L Final    Canceled, Test credited   Unsatisfactory specimen - clotted  ENTERED ONTO ED TRACKBOARD AT 1902       Hemoglobin  11.7 - 15.7 g/dL Final    Canceled, Test credited   Unsatisfactory specimen - clotted  ENTERED ONTO ED TRACKBOARD AT 1902      Hematocrit  35.0 - 47.0 % Final    Canceled, Test credited   Unsatisfactory specimen - clotted  ENTERED ONTO ED TRACKBOARD AT 1902      MCV  78 - 100 fl Final    Canceled, Test credited   Unsatisfactory specimen - clotted  ENTERED ONTO ED TRACKBOARD AT 1902      MCH  26.5 - 33.0 pg Final    Canceled, Test credited   Unsatisfactory specimen - clotted  ENTERED ONTO ED TRACKBOARD AT 1902      MCHC  31.5 - 36.5 g/dL Final    Canceled, Test credited   Unsatisfactory specimen - clotted  ENTERED ONTO ED TRACKBOARD AT 1902      RDW  10.0 - 15.0 % Final    Canceled, Test credited   Unsatisfactory specimen - clotted  ENTERED ONTO ED TRACKBOARD AT 1902      Platelet Count  150 - 450 10e9/L Final    Canceled, Test credited   Unsatisfactory specimen - clotted  ENTERED ONTO ED TRACKBOARD AT 1902      Diff Method   Final    Canceled, Test credited   Unsatisfactory specimen - clotted  ENTERED ONTO ED TRACKBOARD AT 1902           3/31/2017  7:13 PM - Interface, Flexilab Results      Component Results     Component Value Ref Range & Units Status    Sodium 142 133 - 144 mmol/L Final    Potassium 4.3 3.4 - 5.3 mmol/L Final    Chloride 108 94 - 109 mmol/L Final    Carbon Dioxide 22 20 - 32 mmol/L Final    Anion Gap 12 3 - 14 mmol/L Final    Glucose 143 (H) 70 - 99 mg/dL Final    Urea Nitrogen 12 7 - 30 mg/dL Final    Creatinine 0.80 0.52 - 1.04 mg/dL Final    GFR Estimate 75 >60 mL/min/1.7m2 Final    Non  GFR Calc    GFR Estimate If Black >90   GFR Calc   >60 mL/min/1.7m2 Final    Calcium 8.9 8.5 - 10.1 mg/dL Final    Bilirubin Total 0.2 0.2 - 1.3 mg/dL Final    Albumin 3.5 3.4 - 5.0 g/dL Final    Protein Total 8.0 6.8 - 8.8 g/dL Final    Alkaline Phosphatase 96 40 - 150 U/L Final    ALT 25 0 - 50 U/L Final    AST 22 0 - 45 U/L Final         3/31/2017  7:13 PM -  Interface, Flexilab Results      Component Results     Component Value Ref Range & Units Status    Lipase 126 73 - 393 U/L Final         3/31/2017  7:49 PM - Interface, Flexilab Results      Component Results     Component Value Ref Range & Units Status    WBC 7.9 4.0 - 11.0 10e9/L Final    RBC Count 4.18 3.8 - 5.2 10e12/L Final    Hemoglobin 12.7 11.7 - 15.7 g/dL Final    Hematocrit 36.4 35.0 - 47.0 % Final    MCV 87 78 - 100 fl Final    MCH 30.4 26.5 - 33.0 pg Final    MCHC 34.9 31.5 - 36.5 g/dL Final    RDW 12.8 10.0 - 15.0 % Final    Platelet Count 278 150 - 450 10e9/L Final    Diff Method Automated Method  Final    % Neutrophils 75.9 % Final    % Lymphocytes 16.6 % Final    % Monocytes 5.0 % Final    % Eosinophils 2.1 % Final    % Basophils 0.1 % Final    % Immature Granulocytes 0.3 % Final    Nucleated RBCs 0 0 /100 Final    Absolute Neutrophil 6.1 1.6 - 8.3 10e9/L Final    Absolute Lymphocytes 1.3 0.8 - 5.3 10e9/L Final    Absolute Monocytes 0.4 0.0 - 1.3 10e9/L Final    Absolute Eosinophils 0.2 0.0 - 0.7 10e9/L Final    Absolute Basophils 0.0 0.0 - 0.2 10e9/L Final    Abs Immature Granulocytes 0.0 0 - 0.4 10e9/L Final    Absolute Nucleated RBC 0.0  Final                Clinical Quality Measure: Blood Pressure Screening     Your blood pressure was checked while you were in the emergency department today. The last reading we obtained was  BP: 150/81 (Simultaneous filing. User may not have seen previous data.) . Please read the guidelines below about what these numbers mean and what you should do about them.  If your systolic blood pressure (the top number) is less than 120 and your diastolic blood pressure (the bottom number) is less than 80, then your blood pressure is normal. There is nothing more that you need to do about it.  If your systolic blood pressure (the top number) is 120-139 or your diastolic blood pressure (the bottom number) is 80-89, your blood pressure may be higher than it should be. You  "should have your blood pressure rechecked within a year by a primary care provider.  If your systolic blood pressure (the top number) is 140 or greater or your diastolic blood pressure (the bottom number) is 90 or greater, you may have high blood pressure. High blood pressure is treatable, but if left untreated over time it can put you at risk for heart attack, stroke, or kidney failure. You should have your blood pressure rechecked by a primary care provider within the next 4 weeks.  If your provider in the emergency department today gave you specific instructions to follow-up with your doctor or provider even sooner than that, you should follow that instruction and not wait for up to 4 weeks for your follow-up visit.        Thank you for choosing [facility]       Thank you for choosing [facility] for your care. Our goal is always to provide you with excellent care. Hearing back from our patients is one way we can continue to improve our services. Please take a few minutes to complete the written survey that you may receive in the mail after you visit with us. Thank you!        [facility] Information     [facility] lets you send messages to your doctor, view your test results, renew your prescriptions, schedule appointments and more. To sign up, go to [email or web address] . Click on \"Log in\" on the left side of the screen, which will take you to the Welcome page. Then click on \"Sign up Now\" on the right side of the page.     You will be asked to enter the access code listed below, as well as some personal information. Please follow the directions to create your username and password.     Your access code is: [email or web address]  Expires: 2017  3:28 PM     Your access code will  in 90 days. If you need help or a new code, please call your [facility] clinic or [phone].        Care EveryWhere ID     This is your Care EveryWhere ID. This could be used by other organizations to access your [facility] medical " records  [id number]        After Visit Summary       This is your record. Keep this with you and show to your community pharmacist(s) and doctor(s) at your next visit.

## 2017-03-31 NOTE — TELEPHONE ENCOUNTER
HC RN called regarding Tylenol #3 not covering patient's pain.    Asking to either get increase in mg or increase in frequency     Rates pain 10/10 has 2 pills left    Unable to come for office visit today            From 3/17 notes: will be seeing bariatric MD regarding weight loss, then see ortho MD    Advised to make appt, scheduled for April 3 at 1 pm        Controlled Substance Refill Request for Tylenol #3  Problem List Complete:  No     PROVIDER TO CONSIDER COMPLETION OF PROBLEM LIST AND OVERVIEW/CONTROLLED SUBSTANCE AGREEMENT    Last Written Prescription Date:  3/17/17  Last Fill Quantity: 30,   # refills: 0    Last Office Visit with [facility] primary care provider: 3/17/17    Future Office visit:   Next 5 appointments (look out 90 days)     Apr 03, 2017  1:00 PM CDT   Office Visit with Willi Aguilar MD   [facility]    [address] [place] [id number] [phone]                  Controlled substance agreement on file: No.     Processing:  Staff will hand deliver Rx to on-site pharmacy   checked in past 6 months?  No    Call patient with response [phone].    DARNELL Norton

## 2017-04-01 NOTE — DISCHARGE INSTRUCTIONS
Push fluids, Prilosec 2 times a day, liquid Maalox during the day 3-4 times as needed.  Norco for pain as needed.  Zofran as needed for nausea.  Recheck in the clinic early in the week, return to the ER if you have bloody vomiting or stools.      Opioid Medication Information  You have been given a prescription for an opioid (narcotic) pain medicine and/or have received a pain medicine while here in the emergency department. These medicines can make you drowsy or impaired. You must not drive, operate dangerous equipment, or engage in any other dangerous activities while taking these medications. If you drive while taking these medications, you could be arrested for DUI, or driving under the influence. Do not drink any alcohol while you are taking these medications.   Opioid pain medications can cause addiction. If you have a history of chemical dependency of any type, you are at a higher risk of becoming addicted to pain medications. Only take these prescribed medications to treat your pain when all other options have been tried. Take it for as short a time and as few doses as possible. Store your pain pills in a secure place, as they are frequently stolen and provide a dangerous opportunity for children or visitors in your house to start abusing these powerful medications. We will not replace any lost or stolen medicine. As soon as your pain is better, you should flush all your remaining medication.   Many prescription pain medications contain Tylenol (acetaminophen), including Vicodin, Tylenol #3, Norco, Lortab, and Percocet. You should not take any extra pills of Tylenol if you are using these prescription medications or you can get very sick. Do not ever take more than 4000 mg of acetaminophen in any 24 hour period.  All opioids tend to cause constipation. Drink plenty of water and eat foods that have a lot of fiber, such as fruits, vegetables, prune juice, apple juice and high fiber cereal. Take a laxative if  you don t move your bowels at least every other day. Miralax, Milk of Magnesia, Colace, or Senna can be used to keep you regular.

## 2017-04-01 NOTE — ED PROVIDER NOTES
History     Chief Complaint:  Abdominal Pain     HPI   Pamela De León is a 51 year old female with a history of cholecystectomy who presents to the emergency department today for evaluation of abdominal pain via EMS at the right upper quadrant and epigastric regions. The patient states that she has had chronic pain in this area due to an ulcer for multiple years, but states that she developed a new, achy pain today which prompted her visit to the ED. The patient rates her pain at 8/10 in severity. The patient endorses constipation, abdominal distension and loss of appetite. The patient endorses compliance with her ulcer medication. The patient denies fever. The patient denies nausea, vomiting and diarrhea.       Allergies:  Lactose      Medications:    Furosemide (lasix) 20 mg tablet  Potassium chloride (k-tab,klor-con) 10 meq tablet  Montelukast (singulair) 10 mg tablet  Sucralfate (carafate) 1 gm tablet  Dexlansoprazole (dexilant) 30 mg cpdr cr capsule  Cholecalciferol (vitamin d) 2000 units tablet  Fluticasone-salmeterol (advair) 250-50 mcg/dose diskus inhaler  Albuterol (proair hfa/proventil hfa/ventolin hfa) 108 (90 base) mcg/act inhaler  Ketotifen (zaditor) 0.025 % soln  Zolpidem (ambien) 10 mg tablet  Calcium carbonate (os-bree 500 mg [place]. Ca) 500 mg tablet     Past Medical History:    Abnormal glucose   Acute abdominal pain   Anxiety   Arthritis   Asthma   Cardiomyopathy, peripartum, postpartum   CHF (congestive heart failure) (H)   Degenerative disc disease, thoracic   Dependent edema   Depressive disorder   Family history of thyroid disease   GERD (gastroesophageal reflux disease)   Hyperlipidemia   Obesity   Sickle cell trait (H)   Sleep apnea   Ulcer (H)   Morbid obesity     Past Surgical History:     section x4  Cholecystectomy   Esophagoscopy, gastroscopy, duodenoscopy (egd), combined x3   Sinus surgery     Family History:    Anxiety disorder: sister   Depression: sister   Hypertension:  "mother   Bipolar disorder: son     Social History:  The patient was accompanied to the ED by relative.  Smoking Status: never  Smokeless Tobacco: never  Alcohol Use: negative  Marital Status:  Single [1]     Review of Systems   Constitutional: Positive for appetite change (loss). Negative for fever.   Gastrointestinal: Positive for abdominal distention, abdominal pain and constipation. Negative for diarrhea, nausea and vomiting.   All other systems reviewed and are negative.    Physical Exam   Vitals:  Patient Vitals for the past 24 hrs:   BP Temp Temp src Pulse Heart Rate Resp SpO2 Height Weight   03/31/17 2130 143/68 - - 78 - 18 98 % - -   03/31/17 2030 150/81 - - - 96 20 99 % - -   03/31/17 2024 144/80 - - - 98 18 - - -   03/31/17 1900 - - - - - 16 - - -   03/31/17 1830 - - - - - - 98 % - -   03/31/17 1829 143/77 99  F (37.2  C) Oral 82 - 22 100 % 1.549 m (5' 1\") 117.9 kg (260 lb)        Physical Exam  Nursing note and vitals reviewed.  Constitutional:  The patient is morbidly obese. Appears uncomfortable.   HENT:   Head:   No evidence of facial or scalp trauma.  Nose:    Nose normal.   Mouth/Throat:  Mucosa is moist.  Eyes:    Conjunctivae are normal.      Pupils are equal, round, and reactive to light.      Right eye exhibits no discharge. Left eye exhibits no discharge.      No scleral icterus.   Cardiovascular:  Normal rate, regular rhythm.      Normal heart sounds and intact distal pulses.       No murmur heard.  Pulmonary/Chest:  Effort normal and breath sounds normal. No respiratory distress.     No wheezes. No rales. No chest wall tenderness. No stridor.   Abdominal:   Soft. No distension and no mass. Maximal tenderness to epigastric region with    some tenderness to the RUQ. No organomegaly.      No rebound and no guarding. No flank pain.  Musculoskeletal:  Normal range of motion.      No edema and no tenderness.                                       Neck supple, no midline cervical tenderness. "   Neurological:   Alert and oriented to person, place, and year.      No cranial nerve deficit.      Exhibits normal muscle tone. Coordination normal.      GCS eye subscore is 4. GCS verbal subscore is 5.      GCS motor subscore is 6.   Skin:    Skin is warm and dry. No rash noted. No diaphoresis.      No erythema. No pallor.   Psychiatric:   Behavior is normal. Judgment and thought content normal.     Emergency Department Course     Laboratory:  Laboratory findings were communicated with the patient who voiced understanding of the findings.    CBC: AWNL. (WBC 7.9, HGB 12.7, )   CMP: glucose 143 (H) o/w WNL (Creatinine: 0.80)  Lipase: 126      Interventions:  1900: NS 1000 mL IV   1937: GI Cocktail 30 mL Oral   2023: Morphine 4 mg IV   2114: Zofran 4 mg IV        Emergency Department Course:  Nursing notes and vitals reviewed.  I performed an exam of the patient as documented above.     IV was inserted and blood was drawn for laboratory testing, results above.    The patient was rechecked and was updated on the results of her laboratory studies.     I discussed the treatment plan with the patient. They expressed understanding of this plan and consented to discharge. They will be discharged home with instructions for care and follow up. In addition, the patient will return to the emergency department if their symptoms persist, worsen, if new symptoms arise or if there is any concern.  All questions were answered.    I personally reviewed the laboratory results with the Patient and answered all related questions prior to discharge.    Impression & Plan      Medical Decision Making:  Pamela De León is a 51 year old female who presents to the emergency department today for evaluation of epigastric pain. She has a history of an ulcer in the past. No dark stools, blood stools or vomiting. She was given some IV fluids, Morphine and Zofran. Labs were obtained. Her CMP and lipase were normal. Her glucose was slightly  elevated at 143. CBC is normal with a WBC at 7.9 and HGB at 12.7. She was given a GI cocktail which significantly improved her pain. I suspect that this is gastritis again. Maybe an ulcer. She hasn't had any evidence of bleeding. She was given Zofran and will be sent home on Prilosec and few pain pills. I would like her to follow up in clinic, but she knows that if she develops bloody stools or vomiting which could be a sign of bleeding ulcer, she needs to return to the ED.      Diagnosis:    ICD-10-CM    1. Abdominal pain, epigastric R10.13    2. Acute gastritis without hemorrhage, unspecified gastritis type K29.00        Disposition:   Push fluids, Prilosec 2 times a day, liquid Maalox during the day 3-4 times as needed. Norco for pain as needed. Zofran as needed for nausea. Recheck in the clinic early in the week, return to the ER if you have bloody vomiting or stools.       Discharge Medications:     Details   HYDROcodone-acetaminophen (NORCO) 5-325 MG per tablet Take 1-2 tablets by mouth every 4 hours as needed for moderate to severe pain, Disp-10 tablet, R-0, Local Print      ondansetron (ZOFRAN ODT) 4 MG ODT tab Take 1 tablet (4 mg) by mouth every 6 hours as needed for nausea, Disp-6 tablet, R-0, Local Print      omeprazole (PRILOSEC) 20 MG CR capsule Take 1 capsule (20 mg) by mouth 2 times daily, Disp-20 capsule, R-0, Local Print             Scribe Disclosure:  I, Bala Erazo, am serving as a scribe at 7:07 PM on 3/31/2017 to document services personally performed by Josy Roberto MD, based on my observations and the provider's statements to me.    3/31/2017    EMERGENCY DEPARTMENT       Josy Roberto MD  04/01/17 0007

## 2017-04-04 ENCOUNTER — DOCUMENTATION ONLY (OUTPATIENT)
Dept: FAMILY MEDICINE | Facility: CLINIC | Age: 52
End: 2017-04-04

## 2017-04-05 ENCOUNTER — TELEPHONE (OUTPATIENT)
Dept: NURSING | Facility: CLINIC | Age: 52
End: 2017-04-05

## 2017-04-06 ENCOUNTER — OFFICE VISIT (OUTPATIENT)
Dept: FAMILY MEDICINE | Facility: CLINIC | Age: 52
End: 2017-04-06
Payer: MEDICARE

## 2017-04-06 ENCOUNTER — TELEPHONE (OUTPATIENT)
Dept: FAMILY MEDICINE | Facility: CLINIC | Age: 52
End: 2017-04-06

## 2017-04-06 VITALS
BODY MASS INDEX: 49.09 KG/M2 | TEMPERATURE: 98.7 F | DIASTOLIC BLOOD PRESSURE: 72 MMHG | WEIGHT: 260 LBS | SYSTOLIC BLOOD PRESSURE: 135 MMHG | OXYGEN SATURATION: 98 % | HEART RATE: 90 BPM | HEIGHT: 61 IN

## 2017-04-06 DIAGNOSIS — E66.01 MORBID OBESITY, UNSPECIFIED OBESITY TYPE (H): ICD-10-CM

## 2017-04-06 DIAGNOSIS — M79.661 PAIN OF RIGHT LOWER LEG: ICD-10-CM

## 2017-04-06 DIAGNOSIS — R10.13 ABDOMINAL PAIN, EPIGASTRIC: Primary | ICD-10-CM

## 2017-04-06 DIAGNOSIS — M17.11 PRIMARY OSTEOARTHRITIS OF RIGHT KNEE: ICD-10-CM

## 2017-04-06 PROCEDURE — 99214 OFFICE O/P EST MOD 30 MIN: CPT | Performed by: FAMILY MEDICINE

## 2017-04-06 NOTE — TELEPHONE ENCOUNTER
DME for a shower chair faxed to [facility] at [phone].  Padmini Quiroz,

## 2017-04-06 NOTE — NURSING NOTE
"Chief Complaint   Patient presents with     Hospital F/U     Knee Pain       Initial /72 (BP Location: Right arm, Patient Position: Chair, Cuff Size: Adult Large)  Pulse 90  Temp 98.7  F (37.1  C) (Tympanic)  Ht 5' 1\" (1.549 m)  Wt 260 lb (117.9 kg)  SpO2 98%  BMI 49.13 kg/m2 Estimated body mass index is 49.13 kg/(m^2) as calculated from the following:    Height as of this encounter: 5' 1\" (1.549 m).    Weight as of this encounter: 260 lb (117.9 kg).  Medication Reconciliation: complete  "

## 2017-04-06 NOTE — PROGRESS NOTES
SUBJECTIVE:                                                    Tish De León is a 51 year old female who presents to clinic today for the following health issues:      ED/UC Followup:    Facility:  [facility] ED  Date of visit: 3/31/2017  Reason for visit: Epigastric bdominal Pain   Current Status: Improved with PPI     Knee Pain   Followup     Onset: ongoing from OV 3/17    Description:   Location: right knee   Ongoing from last visit   Persistent swelling   And pain   Has seen ortho for that.         Therapies Tried and outcome: Tylenol Codeine not effective     Needs a shower chair.    Problem list and histories reviewed & adjusted, as indicated.  Additional history: as documented    Patient Active Problem List   Diagnosis     Hyperlipidemia LDL goal <160     Major depressive disorder, single episode, severe (H)     ZOE (obstructive sleep apnea)     BMI 45.0-49.9, adult (H)     Vitamin D deficiency disease     Intertrigo     Obesity     Intermittent asthma     Dependent edema     Knee pain     Acute abdominal pain     Gastroenteritis     GE (gastroenteritis)     Sickle cell trait (H)     Anxiety     Gastritis, Helicobacter pylori     Elbow pain     Esophageal reflux     Morbid obesity, unspecified obesity type (H)     Duodenal ulcer without hemorrhage or perforation and without obstruction     Advanced directives, counseling/discussion     Binge eating disorder     Prediabetes     Family history of thyroid disease     Abnormal glucose     Past Surgical History:   Procedure Laterality Date      SECTION  1993    x4     CHOLECYSTECTOMY  2015     ESOPHAGOSCOPY, GASTROSCOPY, DUODENOSCOPY (EGD), COMBINED  2013    Procedure: COMBINED ESOPHAGOSCOPY, GASTROSCOPY, DUODENOSCOPY (EGD), BIOPSY SINGLE OR MULTIPLE;  COMBINED ESOPHAGOSCOPY, GASTROSCOPY, DUODENOSCOPY (EGD);  Surgeon: Luis Ma MD;  Location: [facility]     ESOPHAGOSCOPY, GASTROSCOPY, DUODENOSCOPY (EGD), COMBINED N/A 2016    Procedure:  COMBINED ESOPHAGOSCOPY, GASTROSCOPY, DUODENOSCOPY (EGD);  Surgeon: Kirit Hutchinson MD;  Location:  GI     ESOPHAGOSCOPY, GASTROSCOPY, DUODENOSCOPY (EGD), COMBINED N/A 8/23/2016    Procedure: COMBINED ESOPHAGOSCOPY, GASTROSCOPY, DUODENOSCOPY (EGD), BIOPSY SINGLE OR MULTIPLE;  Surgeon: Kirit Hutchinson MD;  Location:  GI     SINUS SURGERY  10/2011    Nasal surgery       Social History   Substance Use Topics     Smoking status: Never Smoker     Smokeless tobacco: Never Used     Alcohol use No     Family History   Problem Relation Age of Onset     Bipolar Disorder Son      Hypertension Mother      Depression Sister      Anxiety Disorder Sister      Anxiety Disorder Paternal Aunt      Depression Paternal Aunt          Current Outpatient Prescriptions   Medication Sig Dispense Refill     order for DME Equipment being ordered: Shower chair 1 each 0     acetaminophen-codeine (TYLENOL #3) 300-30 MG per tablet Take 1 tablet by mouth 2 times daily as needed for pain 30 tablet 0     ondansetron (ZOFRAN ODT) 4 MG ODT tab Take 1 tablet (4 mg) by mouth every 6 hours as needed for nausea 6 tablet 0     omeprazole (PRILOSEC) 20 MG CR capsule Take 1 capsule (20 mg) by mouth 2 times daily 20 capsule 0     order for DME Equipment being ordered: Wide walker with brakes and seat. 1 each 0     furosemide (LASIX) 20 MG tablet Take 1 tablet (20 mg) by mouth daily as needed 30 tablet 3     potassium chloride (K-TAB,KLOR-CON) 10 MEQ tablet Take 1 tablet (10 mEq) by mouth daily as needed for potassium supplementation 30 tablet 3     montelukast (SINGULAIR) 10 MG tablet Take 1 tablet (10 mg) by mouth At Bedtime 90 tablet 3     sucralfate (CARAFATE) 1 GM tablet Take 1 tablet (1 g) by mouth 4 times daily 40 tablet 1     dexlansoprazole (DEXILANT) 30 MG CPDR CR capsule Take 1 capsule (30 mg) by mouth daily 90 capsule 1     Cholecalciferol (VITAMIN D) 2000 UNITS tablet Take 6000 IU daily for 6 weeks then take 2000 IU daily therafter 100 tablet 3  "    fluticasone-salmeterol (ADVAIR) 250-50 MCG/DOSE diskus inhaler Inhale 1 puff into the lungs 2 times daily 1 Inhaler 3     albuterol (PROAIR HFA/PROVENTIL HFA/VENTOLIN HFA) 108 (90 BASE) MCG/ACT Inhaler Inhale 2 puffs into the lungs every 6 hours as needed for shortness of breath / dyspnea 3 Inhaler 1     ketotifen (ZADITOR) 0.025 % SOLN Place 1 drop into both eyes every 12 hours 1 Bottle 11     zolpidem (AMBIEN) 10 MG tablet        calcium carbonate (OS- MG [place]. [place]) 500 MG tablet Take 1 tablet (500 mg) by mouth 2 times daily 180 tablet 0     Allergies   Allergen Reactions     Lactose      Other reaction(s): Intolerance-Can't Take     No Clinical Screening - See Comments      PN: LW Other1: -Milk = GI upset  PN: LW FI1: ice cream,peanuts,pork \T\ more w/testing  LW FI2:       Reviewed and updated as needed this visit by clinical staff       Reviewed and updated as needed this visit by Provider         ROS:  C: NEGATIVE for fever, chills, change in weight  E/M: NEGATIVE for ear, mouth and throat problems  R: NEGATIVE for significant cough or SOB    OBJECTIVE:                                                    /72 (BP Location: Right arm, Patient Position: Chair, Cuff Size: Adult Large)  Pulse 90  Temp 98.7  F (37.1  C) (Tympanic)  Ht 5' 1\" (1.549 m)  Wt 260 lb (117.9 kg)  SpO2 98%  BMI 49.13 kg/m2  Body mass index is 49.13 kg/(m^2).   GENERAL: healthy, alert, well nourished, well hydrated, no distress  HENT: ear canals- normal; TMs- normal; Nose- normal; Mouth- no ulcers, no lesions  NECK: no tenderness, no adenopathy, no asymmetry, no masses, no stiffness; thyroid- normal to palpation  RESP: lungs clear to auscultation - no rales, no rhonchi, no wheezes  CV: regular rates and rhythm, normal S1 S2, no S3 or S4 and no murmur, no click or rub -  ABDOMEN: soft, no tenderness, no  hepatosplenomegaly, no masses, normal bowel sounds         ASSESSMENT/PLAN:                                             "          1. Abdominal pain, epigastric  ER records reviewed  Improved with PP use.     2. Morbid obesity, unspecified obesity type (H)  F/u with endo for management of obesity      3. Primary osteoarthritis of right knee  Shower chair ordered. Resume home PT/ OT. F/u with ortho   - order for DME; Equipment being ordered: Shower chair  Dispense: 1 each; Refill: 0    4. Pain of right lower leg  T# 3 ordered. F/u with pain clinic next month  - acetaminophen-codeine (TYLENOL #3) 300-30 MG per tablet; Take 1 tablet by mouth 2 times daily as needed for pain  Dispense: 30 tablet; Refill: 0      Follow up with Provider - as needed     Willi Aguilar MD  [facility] [place] [facility]

## 2017-04-06 NOTE — MR AVS SNAPSHOT
"              After Visit Summary   4/6/2017    Tish De León    MRN: [id number]           Patient Information     Date Of Birth          1965        Visit Information        Provider Department      4/6/2017 2:40 PM Willi Aguilar MD [facility]        Today's Diagnoses     Morbid obesity, unspecified obesity type (H)    -  1    Primary osteoarthritis of right knee        Pain of right lower leg           Follow-ups after your visit        Your next 10 appointments already scheduled     May 26, 2017  1:00 PM CDT   New Visit with Polo Patel MD   [facility] ([facility])    [address]  [place] [address] [phone]              Who to contact     If you have questions or need follow up information about today's clinic visit or your schedule please [facility] directly at [phone].  Normal or non-critical lab and imaging results will be communicated to you by MyChart, letter or phone within 4 business days after the clinic has received the results. If you do not hear from us within 7 days, please contact the clinic through [employer] or phone. If you have a critical or abnormal lab result, we will notify you by phone as soon as possible.  Submit refill requests through [employer] or call your pharmacy and they will forward the refill request to us. Please allow 3 business days for your refill to be completed.          Additional Information About Your Visit        MyChart Information     [employer] lets you send messages to your doctor, view your test results, renew your prescriptions, schedule appointments and more. To sign up, go to [email or web address] . Click on \"Log in\" on the left side of the screen, which will take you to the Welcome page. Then click on \"Sign up Now\" on the right side of the page.     You will be asked to enter the access code listed below, as well as some personal information. " "Please follow the directions to create your username and password.     Your access code is: [email or web address]  Expires: 2017  3:28 PM     Your access code will  in 90 days. If you need help or a new code, please call your [place] clinic or [phone].        Care EveryWhere ID     This is your Care EveryWhere ID. This could be used by other organizations to access your [place] medical records  [id number]        Your Vitals Were     Pulse Temperature Height Pulse Oximetry BMI (Body Mass Index)       90 98.7  F (37.1  C) (Tympanic) 5' 1\" (1.549 m) 98% 49.13 kg/m2        Blood Pressure from Last 3 Encounters:   17 135/72   17 143/68   17 124/84    Weight from Last 3 Encounters:   17 260 lb (117.9 kg)   17 260 lb (117.9 kg)   17 267 lb 14.4 oz (121.5 kg)              Today, you had the following     No orders found for display         Today's Medication Changes          These changes are accurate as of: 17  2:54 PM.  If you have any questions, ask your nurse or doctor.               These medicines have changed or have updated prescriptions.        Dose/Directions    * order for DME   This may have changed:  Another medication with the same name was added. Make sure you understand how and when to take each.   Used for:  Pain of right lower leg   Changed by:  Willi Aguilar MD        Equipment being ordered: Wide walker with brakes and seat.   Quantity:  1 each   Refills:  0       * order for DME   This may have changed:  You were already taking a medication with the same name, and this prescription was added. Make sure you understand how and when to take each.   Used for:  Morbid obesity, unspecified obesity type (H), Primary osteoarthritis of right knee   Changed by:  Willi Aguilar MD        Equipment being ordered: Shower chair   Quantity:  1 each   Refills:  0       * Notice:  This list has 2 medication(s) that are the same as other medications prescribed for you. " Read the directions carefully, and ask your doctor or other care provider to review them with you.      Stop taking these medicines if you haven't already. Please contact your care team if you have questions.     HYDROcodone-acetaminophen 5-325 MG per tablet   Commonly known as:  NORCO   Stopped by:  Willi Aguilar MD                Where to get your medicines      Some of these will need a paper prescription and others can be bought over the counter.  Ask your nurse if you have questions.     Bring a paper prescription for each of these medications     acetaminophen-codeine 300-30 MG per tablet    order for DME                Primary Care Provider Office Phone # Fax #    Willi Aguilar [phone]       [address]  [place] [address]        Thank you!     Thank you for choosing [facility]  for your care. Our goal is always to provide you with excellent care. Hearing back from our patients is one way we can continue to improve our services. Please take a few minutes to complete the written survey that you may receive in the mail after your visit with us. Thank you!             Your Updated Medication List - Protect others around you: Learn how to safely use, store and throw away your medicines at www.disposemymeds.org.          This list is accurate as of: 4/6/17  2:54 PM.  Always use your most recent med list.                   Brand Name Dispense Instructions for use    acetaminophen-codeine 300-30 MG per tablet    TYLENOL #3    30 tablet    Take 1 tablet by mouth 2 times daily as needed for pain       albuterol 108 (90 BASE) MCG/ACT Inhaler    PROAIR HFA/PROVENTIL HFA/VENTOLIN HFA    3 Inhaler    Inhale 2 puffs into the lungs every 6 hours as needed for shortness of breath / dyspnea       calcium carbonate 500 MG tablet    OS- mg [place]. Ca    180 tablet    Take 1 tablet (500 mg) by mouth 2 times daily       dexlansoprazole 30 MG  Cpdr CR capsule    DEXILANT    90 capsule    Take 1 capsule (30 mg) by mouth daily       fluticasone-salmeterol 250-50 MCG/DOSE diskus inhaler    ADVAIR    1 Inhaler    Inhale 1 puff into the lungs 2 times daily       furosemide 20 MG tablet    LASIX    30 tablet    Take 1 tablet (20 mg) by mouth daily as needed       ketotifen 0.025 % Soln ophthalmic solution    ZADITOR    1 Bottle    Place 1 drop into both eyes every 12 hours       montelukast 10 MG tablet    SINGULAIR    90 tablet    Take 1 tablet (10 mg) by mouth At Bedtime       omeprazole 20 MG CR capsule    priLOSEC    20 capsule    Take 1 capsule (20 mg) by mouth 2 times daily       ondansetron 4 MG ODT tab    ZOFRAN ODT    6 tablet    Take 1 tablet (4 mg) by mouth every 6 hours as needed for nausea       * order for DME     1 each    Equipment being ordered: Wide walker with brakes and seat.       * order for DME     1 each    Equipment being ordered: Shower chair       potassium chloride 10 MEQ tablet    K-TAB,KLOR-CON    30 tablet    Take 1 tablet (10 mEq) by mouth daily as needed for potassium supplementation       sucralfate 1 GM tablet    CARAFATE    40 tablet    Take 1 tablet (1 g) by mouth 4 times daily       vitamin D 2000 UNITS tablet     100 tablet    Take 6000 IU daily for 6 weeks then take 2000 IU daily therafter       zolpidem 10 MG tablet    AMBIEN         * Notice:  This list has 2 medication(s) that are the same as other medications prescribed for you. Read the directions carefully, and ask your doctor or other care provider to review them with you.

## 2017-04-07 ENCOUNTER — TELEPHONE (OUTPATIENT)
Dept: NURSING | Facility: CLINIC | Age: 52
End: 2017-04-07

## 2017-04-07 NOTE — TELEPHONE ENCOUNTER
Ifeoma BEN calling to additional order     Skill nursing  2 times per week for 3 weeks  1 time per week for 3 weeks.    2 PRN      HHA - one time per week for 5 week.      Verbal order given to fax for signature.        Radha Collins RN

## 2017-04-11 ENCOUNTER — TELEPHONE (OUTPATIENT)
Dept: FAMILY MEDICINE | Facility: CLINIC | Age: 52
End: 2017-04-11

## 2017-04-11 DIAGNOSIS — M17.11 PRIMARY OSTEOARTHRITIS OF RIGHT KNEE: ICD-10-CM

## 2017-04-11 DIAGNOSIS — E66.01 MORBID OBESITY, UNSPECIFIED OBESITY TYPE (H): Primary | ICD-10-CM

## 2017-04-11 NOTE — TELEPHONE ENCOUNTER
JAMESON called [facility] to cancel shower bench order as requested below  [facility]     [address] [place] [address]  Phone: [phone]   FAX: [phone]    Shamika BARBA

## 2017-04-11 NOTE — TELEPHONE ENCOUNTER
Maria Luz FV  calling to request order for tub transfer bench. States dx will need to be listed for knee pain and obesity. Order will also need to list patients height and weight. Informed there was an order placed for shower bench through [facility] - she requests that order be canceled. Routing to provider to place order and to team to call and cancel [facility] order.     Maria Luz can be reached at [phone] with any questions - okay to leave detailed message.     Fax order to [facility] attn: Jennifer [phone]    Stephanie Asif RN   [facility] - Triage

## 2017-04-13 NOTE — TELEPHONE ENCOUNTER
[facility] called  DME for tub transfer bench was to be faxed to   Fax order to [facility] attn: Jennifer [phone]  Will need to call [facility] to cancel the DME that was faxed to them 4/11/17  Shamkia BARBA

## 2017-04-14 ENCOUNTER — TELEPHONE (OUTPATIENT)
Dept: FAMILY MEDICINE | Facility: CLINIC | Age: 52
End: 2017-04-14

## 2017-04-14 NOTE — TELEPHONE ENCOUNTER
DME order for tub transfer bench faxed to [facility] at the number below on 4/13/17.  Padmini Quiroz,

## 2017-04-14 NOTE — TELEPHONE ENCOUNTER
Detailed message left for nurse with providers recommendations below.  Whitney Head RN  [facility] [employer]

## 2017-04-14 NOTE — TELEPHONE ENCOUNTER
If Dexilant is covered by insurance, I would recommend using that as it has longer coverage. If not covered, use Omeprazole.     Willi Aguilar MD  [facility], [place]

## 2017-04-14 NOTE — TELEPHONE ENCOUNTER
Kayla from [facility] calling and notes that patient was recently put on Omeprazole in ER visit and is also on Dexilant.  Please clarify which PPI patient should be taking.  Whitney Head RN  [facility] [employer]

## 2017-04-14 NOTE — TELEPHONE ENCOUNTER
Trixie from PT calling for orders for Pt 2 times weekly for 2 weeks.  Verbal approval given for Home Care orders requested.  Routing to PCP to notify per [facility] protocol  Whitney Head RN  [facility] [employer]

## 2017-04-17 ENCOUNTER — TELEPHONE (OUTPATIENT)
Dept: NURSING | Facility: CLINIC | Age: 52
End: 2017-04-17

## 2017-04-17 PROCEDURE — G0180 MD CERTIFICATION HHA PATIENT: HCPCS | Performed by: FAMILY MEDICINE

## 2017-04-19 ENCOUNTER — TELEPHONE (OUTPATIENT)
Dept: FAMILY MEDICINE | Facility: CLINIC | Age: 52
End: 2017-04-19

## 2017-04-19 NOTE — TELEPHONE ENCOUNTER
Trixie - [facility] calling. States the patient has still not received her walker. States she spoke with a representative at [facility] who told her they have not received chart notes.  Informed her when I spoke with them 2 weeks ago they stated they just received the chart notes. Told her I would call to try to speed this along. [facility] number [phone].     Triage to call Trixie with updated information [phone] okay to leave detailed message.     Spoke with Angélica at [facility] and expressed frustration as this order was sent 3/17/17. States there are 2 open accounts which is what is causing confusion and creating delays. States she will place an urgent request to get this pushed through.     Trixie informed, routing to PCP as FREDDY.     Stephanie Asif RN   [facility] - Triage

## 2017-04-20 ENCOUNTER — TELEPHONE (OUTPATIENT)
Dept: FAMILY MEDICINE | Facility: CLINIC | Age: 52
End: 2017-04-20

## 2017-04-20 NOTE — TELEPHONE ENCOUNTER
Spoke with Nathaniel at [facility], and again expressed frustration as it is unacceptable for the patient to wait over a month for her walker. He again informed me that the multiple accounts for the patient is what has caused confusion. He states he will see what he can do to get patient the walker today. I asked him to please follow-up with me today so I am aware of the status of this order. Routing to PCP as FREDDY.     Spoke with Maria Luz CONTRERAS RN and informed of above.     Stephanie Asif RN   [facility] - Triage

## 2017-04-20 NOTE — TELEPHONE ENCOUNTER
RN home care calliing- patient has appt with Jeff tomorrow afternoon for a preop- the walker needs to be addressed first int he notes and faxed to [facility]    Walker faxed to [facility]- cannot fill with out progress note    Needs a progress that states all the medical reasons why patient needs a 4 wheeled walker      faxed to [facility] at [phone]      Vida Levine RN  [facility]  [phone]

## 2017-04-20 NOTE — TELEPHONE ENCOUNTER
This is not acceptable. My last 2 notes clearly say why she absolutely need a wheelchair. They're making it harder for her by delaying it. I'm sure we have sent them our notes. I would like to talk to somebody there. Please provide me with a number, if necessary. Otherwise you can call them.    She has morbid obesity, she has severe osteoarthritis of her knee. She has significant pain in her leg. This makes it hard for her to ambulate. She currently has home health coming to her but she is also lined up for a few appointments to help with weight loss, for her orthopedic appointment.  She has asthma which is not well controlled.    I'm not sure why they need more documentation. Now she is coming in for preop. That also is in the reason for why she could use a walker.    Willi Aguilar MD  [facility], [place]

## 2017-04-21 ENCOUNTER — OFFICE VISIT (OUTPATIENT)
Dept: FAMILY MEDICINE | Facility: CLINIC | Age: 52
End: 2017-04-21
Payer: MEDICARE

## 2017-04-21 ENCOUNTER — DOCUMENTATION ONLY (OUTPATIENT)
Dept: CARE COORDINATION | Facility: CLINIC | Age: 52
End: 2017-04-21

## 2017-04-21 ENCOUNTER — TELEPHONE (OUTPATIENT)
Dept: FAMILY MEDICINE | Facility: CLINIC | Age: 52
End: 2017-04-21

## 2017-04-21 VITALS
TEMPERATURE: 98.6 F | HEART RATE: 88 BPM | WEIGHT: 263 LBS | HEIGHT: 61 IN | BODY MASS INDEX: 49.65 KG/M2 | OXYGEN SATURATION: 98 % | SYSTOLIC BLOOD PRESSURE: 130 MMHG | DIASTOLIC BLOOD PRESSURE: 76 MMHG

## 2017-04-21 DIAGNOSIS — J45.20 INTERMITTENT ASTHMA, UNCOMPLICATED: ICD-10-CM

## 2017-04-21 DIAGNOSIS — G47.33 OSA (OBSTRUCTIVE SLEEP APNEA): ICD-10-CM

## 2017-04-21 DIAGNOSIS — M17.11 PRIMARY OSTEOARTHRITIS OF RIGHT KNEE: ICD-10-CM

## 2017-04-21 DIAGNOSIS — K21.9 GASTROESOPHAGEAL REFLUX DISEASE WITHOUT ESOPHAGITIS: ICD-10-CM

## 2017-04-21 DIAGNOSIS — Z01.818 PREOP GENERAL PHYSICAL EXAM: Primary | ICD-10-CM

## 2017-04-21 DIAGNOSIS — J01.00 ACUTE MAXILLARY SINUSITIS, RECURRENCE NOT SPECIFIED: ICD-10-CM

## 2017-04-21 DIAGNOSIS — E66.01 MORBID OBESITY, UNSPECIFIED OBESITY TYPE (H): ICD-10-CM

## 2017-04-21 PROBLEM — Z83.49 FAMILY HISTORY OF THYROID DISEASE: Status: RESOLVED | Noted: 2017-03-29 | Resolved: 2017-04-21

## 2017-04-21 PROCEDURE — 93000 ELECTROCARDIOGRAM COMPLETE: CPT | Performed by: FAMILY MEDICINE

## 2017-04-21 PROCEDURE — 99215 OFFICE O/P EST HI 40 MIN: CPT | Mod: 25 | Performed by: FAMILY MEDICINE

## 2017-04-21 RX ORDER — AZITHROMYCIN 250 MG/1
TABLET, FILM COATED ORAL
Qty: 6 TABLET | Refills: 0 | Status: SHIPPED | OUTPATIENT
Start: 2017-04-21 | End: 2017-05-23

## 2017-04-21 RX ORDER — IBUPROFEN 200 MG
600 CAPSULE ORAL 2 TIMES DAILY
Qty: 60 TABLET | Refills: 11 | Status: SHIPPED | OUTPATIENT
Start: 2017-04-21

## 2017-04-21 ASSESSMENT — ASTHMA QUESTIONNAIRES
QUESTION_1 LAST FOUR WEEKS HOW MUCH OF THE TIME DID YOUR ASTHMA KEEP YOU FROM GETTING AS MUCH DONE AT WORK, SCHOOL OR AT HOME: A LITTLE OF THE TIME
QUESTION_4 LAST FOUR WEEKS HOW OFTEN HAVE YOU USED YOUR RESCUE INHALER OR NEBULIZER MEDICATION (SUCH AS ALBUTEROL): TWO OR THREE TIMES PER WEEK
ACT_TOTALSCORE: 20
QUESTION_5 LAST FOUR WEEKS HOW WOULD YOU RATE YOUR ASTHMA CONTROL: WELL CONTROLLED
QUESTION_2 LAST FOUR WEEKS HOW OFTEN HAVE YOU HAD SHORTNESS OF BREATH: ONCE OR TWICE A WEEK
QUESTION_3 LAST FOUR WEEKS HOW OFTEN DID YOUR ASTHMA SYMPTOMS (WHEEZING, COUGHING, SHORTNESS OF BREATH, CHEST TIGHTNESS OR PAIN) WAKE YOU UP AT NIGHT OR EARLIER THAN USUAL IN THE MORNING: NOT AT ALL

## 2017-04-21 NOTE — TELEPHONE ENCOUNTER
Seen today, was given rx for tylenol #3 to be filled at  [facility]    Prior rx for Tylenol #3 had been filled recently, Pharmacist could not fill     Huddled with Cleve, Pharmacist, stated that Dr. Aguilar wanted rx to be cancelled/destroyed, which he did.      Informed patient of inability of pharmacist to return rx to patient    Advised to contact PCP when refill is required.  Patient stated understanding of situation.     DARNELL Norton

## 2017-04-21 NOTE — PROGRESS NOTES
[facility]  [address] [address]  [phone]  Dept: [phone]    PRE-OP EVALUATION:  Today's date: 2017    Tish De León (: 1965) presents for pre-operative evaluation assessment as requested by Dr.Scott Hopkins .  She requires evaluation and anesthesia risk assessment prior to undergoing surgery/procedure for treatment of  Right knee arthroscopy partial medial meniscectomy  .  Proposed procedure: Right knee arthroscopy partial medial meniscectomy     Date of Surgery/ Procedure: 17  Time of Surgery/ Procedure: [facility]  Hospital/Surgical Facility: [facility]  Fax number for surgical facility: [phone]  Primary Physician: Willi Aguilar  Type of Anesthesia Anticipated: to be determined    Patient has a Health Care Directive or Living Will:  NO    1. NO - Do you have a history of heart attack, stroke, stent, bypass or surgery on an artery in the head, neck, heart or legs?  2. NO - Do you ever have any pain or discomfort in your chest?  3. YES once in  - Do you have a history of  Heart Failure?  4. YES due to Asthma - Are you troubled by shortness of breath when: walking on the level, up a slight hill or at night?  5. NO - Do you currently have a cold, bronchitis or other respiratory infection?  6. NO - Do you have a cough, shortness of breath or wheezing?  7. NO - Do you sometimes get pains in the calves of your legs when you walk?  8. NO - Do you or anyone in your family have previous history of blood clots?  9. NO - Do you or does anyone in your family have a serious bleeding problem such as prolonged bleeding following surgeries or cuts?  10. YES takes Iron - Have you ever had problems with anemia or been told to take iron pills?  11. NO - Have you had any abnormal blood loss such as black, tarry or bloody stools, or abnormal vaginal bleeding?  12. NO - Have you ever had a blood transfusion?  13. NO - Have you or  any of your relatives ever had problems with anesthesia?  14. YES Sleep apnea - Do you have sleep apnea, excessive snoring or daytime drowsiness?  15. NO - Do you have any prosthetic heart valves?  16. NO - Do you have prosthetic joints?  17. NO - Is there any chance that you may be pregnant?      HPI:                                                        ASTHMA - Patient has a longstanding history of moderate-severe Asthma . Patient has been doing well overall noting SOB and continues on medication regimen without adverse reactions or side effects.                                                                        ACT Total Scores 4/21/2017   ACT TOTAL SCORE (Goal Greater than or Equal to 20) 20   In the past 12 months, how many times did you visit the emergency room for your asthma without being admitted to the hospital? 0   In the past 12 months, how many times were you hospitalized overnight because of your asthma? 0     H/o ZOE. Uses CPAP.                                                     .    MEDICAL HISTORY:                                                      Patient Active Problem List    Diagnosis Date Noted     Gastroesophageal reflux disease without esophagitis 04/21/2017     Priority: Medium     Intermittent asthma, uncomplicated 04/21/2017     Priority: Medium     Primary osteoarthritis of right knee 04/11/2017     Priority: Medium     Prediabetes 03/29/2017     Priority: Medium     Duodenal ulcer without hemorrhage or perforation and without obstruction 08/25/2016     Priority: Medium     Advanced directives, counseling/discussion 08/25/2016     Priority: Medium     Binge eating disorder 08/25/2016     Priority: Medium     Morbid obesity, unspecified obesity type (H) 03/22/2016     Priority: Medium     Sickle cell trait (H) 08/21/2012     Priority: Medium     Anxiety 08/21/2012     Priority: Medium     Dependent edema 08/10/2012     Priority: Medium     Vitamin D deficiency disease 03/30/2012      Priority: Medium     Major depressive disorder, single episode, severe (H) 2012     Priority: Medium     Problem list name updated by automated process. Provider to review       ZOE (obstructive sleep apnea) 2012     Priority: Medium     Uses CPAP        Past Medical History:   Diagnosis Date     Abnormal glucose 3/29/2017     Acute abdominal pain 2012     Anxiety 2012     Arthritis      Asthma      Cardiomyopathy, peripartum, postpartum      CHF (congestive heart failure) (H)     normal echo      Degenerative disc disease, thoracic      Dependent edema 8/10/2012     Depressive disorder      Family history of thyroid disease 3/29/2017     GERD (gastroesophageal reflux disease)      Hyperlipidemia      Obesity      Sickle cell trait (H) 2012     Sleep apnea     CPAP     Ulcer (H)      Past Surgical History:   Procedure Laterality Date      SECTION  1993    x4     CHOLECYSTECTOMY  2015     ESOPHAGOSCOPY, GASTROSCOPY, DUODENOSCOPY (EGD), COMBINED  2013    Procedure: COMBINED ESOPHAGOSCOPY, GASTROSCOPY, DUODENOSCOPY (EGD), BIOPSY SINGLE OR MULTIPLE;  COMBINED ESOPHAGOSCOPY, GASTROSCOPY, DUODENOSCOPY (EGD);  Surgeon: Luis Ma MD;  Location:  GI     ESOPHAGOSCOPY, GASTROSCOPY, DUODENOSCOPY (EGD), COMBINED N/A 2016    Procedure: COMBINED ESOPHAGOSCOPY, GASTROSCOPY, DUODENOSCOPY (EGD);  Surgeon: Kirit Hutchinson MD;  Location:  GI     ESOPHAGOSCOPY, GASTROSCOPY, DUODENOSCOPY (EGD), COMBINED N/A 2016    Procedure: COMBINED ESOPHAGOSCOPY, GASTROSCOPY, DUODENOSCOPY (EGD), BIOPSY SINGLE OR MULTIPLE;  Surgeon: Kirit Hutchinson MD;  Location:  GI     SINUS SURGERY  10/2011    Nasal surgery     Current Outpatient Prescriptions   Medication Sig Dispense Refill     acetaminophen-codeine (TYLENOL #3) 300-30 MG per tablet Take 1 tablet by mouth 2 times daily as needed for pain 60 tablet 0     calcium carbonate 600 MG tablet Take 1 tablet (600 mg) by mouth 2  times daily 60 tablet 11     azithromycin (ZITHROMAX) 250 MG tablet Two tablets first day, then one tablet daily for four days. 6 tablet 0     furosemide (LASIX) 20 MG tablet Take 1 tablet (20 mg) by mouth daily as needed 30 tablet 3     potassium chloride (K-TAB,KLOR-CON) 10 MEQ tablet Take 1 tablet (10 mEq) by mouth daily as needed for potassium supplementation 30 tablet 3     montelukast (SINGULAIR) 10 MG tablet Take 1 tablet (10 mg) by mouth At Bedtime 90 tablet 3     sucralfate (CARAFATE) 1 GM tablet Take 1 tablet (1 g) by mouth 4 times daily 40 tablet 1     dexlansoprazole (DEXILANT) 30 MG CPDR CR capsule Take 1 capsule (30 mg) by mouth daily 90 capsule 1     Cholecalciferol (VITAMIN D) 2000 UNITS tablet Take 6000 IU daily for 6 weeks then take 2000 IU daily therafter 100 tablet 3     fluticasone-salmeterol (ADVAIR) 250-50 MCG/DOSE diskus inhaler Inhale 1 puff into the lungs 2 times daily 1 Inhaler 3     albuterol (PROAIR HFA/PROVENTIL HFA/VENTOLIN HFA) 108 (90 BASE) MCG/ACT Inhaler Inhale 2 puffs into the lungs every 6 hours as needed for shortness of breath / dyspnea 3 Inhaler 1     ketotifen (ZADITOR) 0.025 % SOLN Place 1 drop into both eyes every 12 hours 1 Bottle 11     OTC products: None, except as noted above    Allergies   Allergen Reactions     Lactose      Other reaction(s): Intolerance-Can't Take     No Clinical Screening - See Comments      PN: LW Other1: -Milk = GI upset  PN: LW FI1: ice cream,peanuts,pork \T\ more w/testing  LW FI2:      Latex Allergy: NO    Social History   Substance Use Topics     Smoking status: Never Smoker     Smokeless tobacco: Never Used     Alcohol use No     History   Drug Use No       REVIEW OF SYSTEMS:                                                    C: NEGATIVE for fever, chills, change in weight  I: NEGATIVE for worrisome rashes, moles or lesions  E: NEGATIVE for vision changes or irritation  E/M: c/o sinus pressure and post nasal discharge.   R: NEGATIVE for  "significant cough or SOB  B: NEGATIVE for masses, tenderness or discharge  CV: NEGATIVE for chest pain, palpitations or peripheral edema  GI: NEGATIVE for nausea, abdominal pain, heartburn, or change in bowel habits  : NEGATIVE for frequency, dysuria, or hematuria  M: NEGATIVE for significant arthralgias or myalgia  N: NEGATIVE for weakness, dizziness or paresthesias  E: NEGATIVE for temperature intolerance, skin/hair changes  H: NEGATIVE for bleeding problems  P: NEGATIVE for changes in mood or affect    EXAM:                                                    /76 (BP Location: Right arm, Cuff Size: Adult Large)  Pulse 88  Temp 98.6  F (37  C) (Tympanic)  Ht 5' 1\" (1.549 m)  Wt 263 lb (119.3 kg)  SpO2 98%  BMI 49.69 kg/m2    GENERAL APPEARANCE: healthy, alert and no distress     EYES: EOMI, PERRL     HENT: ear canals and TM's normal and mouth without ulcers or lesions. B/l maxillary sinus tenderness noted.      NECK: no adenopathy, no asymmetry, masses, or scars and thyroid normal to palpation     RESP: lungs clear to auscultation - no rales, rhonchi or wheezes     CV: regular rates and rhythm, normal S1 S2, no S3 or S4 and no murmur, click or rub     ABDOMEN:  soft, nontender, no HSM or masses and bowel sounds normal     MS: extremities normal- no gross deformities noted, no evidence of inflammation in joints, FROM in all extremities.     SKIN: no suspicious lesions or rashes     NEURO: Normal strength and tone, sensory exam grossly normal, mentation intact and speech normal     PSYCH: mentation appears normal. and affect normal/bright     LYMPHATICS: No axillary, cervical, or supraclavicular nodes    DIAGNOSTICS:                                                    EKG: ordered and reviewed. NSR , no acute ST or T wave changes noted.     Recent Labs   Lab Test  03/31/17   1928  03/31/17   1843  03/29/17   1325  09/27/16   1200   03/22/16   1338   HGB  12.7  Canceled, Test credited   Unsatisfactory " specimen - clotted  ENTERED ONTO ED TRACKBOARD AT 1902     --   12.7   < >  13.5   PLT  278  Canceled, Test credited   Unsatisfactory specimen - clotted  ENTERED ONTO ED TRACKBOARD AT 1902     --   345   < >  189   NA   --   142   --   139   < >  138   POTASSIUM   --   4.3   --   4.1   < >  4.0   CR   --   0.80   --   0.75   < >  1.00   A1C   --    --   6.2*   --    --   6.1*    < > = values in this interval not displayed.        IMPRESSION:                                                    Reason for surgery/procedure: Knee arthroscopy  Diagnosis/reason for consult: Preop exam    The proposed surgical procedure is considered INTERMEDIATE risk.    REVISED CARDIAC RISK INDEX  The patient has the following serious cardiovascular risks for perioperative complications such as (MI, PE, VFib and 3  AV Block):  No serious cardiac risks  INTERPRETATION: 0 risks: Class I (very low risk - 0.4% complication rate)    The patient has the following additional risks for perioperative complications:  No identified additional risks      ICD-10-CM    1. Preop general physical exam Z01.818 EKG 12-lead complete w/read - Clinics   2. Acute maxillary sinusitis, recurrence not specified J01.00 azithromycin (ZITHROMAX) 250 MG tablet   3. Pain of right lower leg M79.661 acetaminophen-codeine (TYLENOL #3) 300-30 MG per tablet   4. ZOE (obstructive sleep apnea) G47.33    5. Morbid obesity, unspecified obesity type (H) E66.01    6. Gastroesophageal reflux disease without esophagitis K21.9    7. Intermittent asthma, uncomplicated J45.20        RECOMMENDATIONS:                                                      Acute Maxillary sinusitis- starting the patient on Z-pack. If any worsening of symptoms or no improvement noted, recommended to notify me back in 4-5 days.     Obstructive Sleep Apnea (or suspected sleep apnea)  Patient is clearly advised to use their home CPAP when released from surgery      APPROVAL GIVEN to proceed with proposed  procedure, without further diagnostic evaluation       Signed Electronically by: Willi Aguilar MD    Copy of this evaluation report is provided to requesting physician.    [facility] Preop Guidelines

## 2017-04-21 NOTE — NURSING NOTE
"Chief Complaint   Patient presents with     Pre-Op Exam       Initial /76 (BP Location: Right arm, Cuff Size: Adult Large)  Pulse 88  Temp 98.6  F (37  C) (Tympanic)  Ht 5' 1\" (1.549 m)  Wt 263 lb (119.3 kg)  SpO2 98%  BMI 49.69 kg/m2 Estimated body mass index is 49.69 kg/(m^2) as calculated from the following:    Height as of this encounter: 5' 1\" (1.549 m).    Weight as of this encounter: 263 lb (119.3 kg).  Medication Reconciliation: complete  "

## 2017-04-21 NOTE — PROGRESS NOTES
[facility] now requests orders and shares plan of care/discharge summaries for some patients through [facility].  Please REPLY TO THIS MESSAGE in order to give authorization for orders when needed.  This is considered a verbal order, you will still receive a faxed copy of orders for signature.  Thank you for your assistance in improving collaboration for our patients        Requesting additional home care OT orders 2 x w x 1 w and up to 1 PRN visit for bathroom equipment/transfers, aromatherapy, and continence management/exercise.    Thank you, Jeanette Stiles .[phone]

## 2017-04-21 NOTE — MR AVS SNAPSHOT
After Visit Summary   4/21/2017    Tish De León    MRN: [id number]           Patient Information     Date Of Birth          1965        Visit Information        Provider Department      4/21/2017 1:20 PM Willi Aguilar MD [facility] [place]        Today's Diagnoses     Preop general physical exam    -  1    Primary osteoarthritis of right knee        Acute maxillary sinusitis, recurrence not specified        ZOE (obstructive sleep apnea)        Morbid obesity, unspecified obesity type (H)        Gastroesophageal reflux disease without esophagitis        Intermittent asthma, uncomplicated          Care Instructions      Before Your Surgery      Call your surgeon if there is any change in your health. This includes signs of a cold or flu (such as a sore throat, runny nose, cough, rash or fever).    Do not smoke, drink alcohol or take over the counter medicine (unless your surgeon or primary care doctor tells you to) for the 24 hours before and after surgery.    If you take prescribed drugs: Follow your doctor s orders about which medicines to take and which to stop until after surgery.    Eating and drinking prior to surgery: follow the instructions from your surgeon    Take a shower or bath the night before surgery. Use the soap your surgeon gave you to gently clean your skin. If you do not have soap from your surgeon, use your regular soap. Do not shave or scrub the surgery site.  Wear clean pajamas and have clean sheets on your bed.         Follow-ups after your visit        Your next 10 appointments already scheduled     May 26, 2017  1:00 PM CDT   New Visit with Polo Patel MD   [facility] [place] ([facility] [place])    [address]  [place] [address] [phone]              Who to contact     If you have questions or need follow up information about today's clinic visit or your schedule please contact [facility] [place]  "directly at [phone].  Normal or non-critical lab and imaging results will be communicated to you by Ovalishart, letter or phone within 4 business days after the clinic has received the results. If you do not hear from us within 7 days, please contact the clinic through Ovalishart or phone. If you have a critical or abnormal lab result, we will notify you by phone as soon as possible.  Submit refill requests through PixelPlay or call your pharmacy and they will forward the refill request to us. Please allow 3 business days for your refill to be completed.          Additional Information About Your Visit        [employer] Information     PixelPlay lets you send messages to your doctor, view your test results, renew your prescriptions, schedule appointments and more. To sign up, go to [email or web address] . Click on \"Log in\" on the left side of the screen, which will take you to the Welcome page. Then click on \"Sign up Now\" on the right side of the page.     You will be asked to enter the access code listed below, as well as some personal information. Please follow the directions to create your username and password.     Your access code is: [email or web address]  Expires: 2017  3:28 PM     Your access code will  in 90 days. If you need help or a new code, please call your [place] clinic or [phone].        Care EveryWhere ID     This is your Care EveryWhere ID. This could be used by other organizations to access your [place] medical records  [id number]        Your Vitals Were     Pulse Temperature Height Pulse Oximetry BMI (Body Mass Index)       88 98.6  F (37  C) (Tympanic) 5' 1\" (1.549 m) 98% 49.69 kg/m2        Blood Pressure from Last 3 Encounters:   17 130/76   17 135/72   17 143/68    Weight from Last 3 Encounters:   17 263 lb (119.3 kg)   17 260 lb (117.9 kg)   17 260 lb (117.9 kg)              We Performed the Following     EKG 12-lead complete w/read - Clinics        "   Today's Medication Changes          These changes are accurate as of: 4/21/17 11:59 PM.  If you have any questions, ask your nurse or doctor.               Start taking these medicines.        Dose/Directions    azithromycin 250 MG tablet   Commonly known as:  ZITHROMAX   Used for:  Acute maxillary sinusitis, recurrence not specified   Started by:  Willi Aguilar MD        Two tablets first day, then one tablet daily for four days.   Quantity:  6 tablet   Refills:  0         These medicines have changed or have updated prescriptions.        Dose/Directions    calcium 600 MG tablet   This may have changed:    - medication strength  - how much to take   Changed by:  Willi Aguilar MD        Dose:  600 mg   Take 1 tablet (600 mg) by mouth 2 times daily   Quantity:  60 tablet   Refills:  11         Stop taking these medicines if you haven't already. Please contact your care team if you have questions.     acetaminophen-codeine 300-30 MG per tablet   Commonly known as:  TYLENOL #3   Stopped by:  Willi Aguilar MD           omeprazole 20 MG CR capsule   Commonly known as:  priLOSEC   Stopped by:  Willi Aguilar MD           ondansetron 4 MG ODT tab   Commonly known as:  ZOFRAN ODT   Stopped by:  Willi Aguilar MD           order for DME   Stopped by:  Willi Aguilar MD           zolpidem 10 MG tablet   Commonly known as:  AMBIEN   Stopped by:  Willi Aguilar MD                Where to get your medicines      These medications were sent to [facility] [place] [address] [address]     Phone:  [phone]     azithromycin 250 MG tablet    calcium 600 MG tablet                Primary Care Provider Office Phone # Fax #    Willi Aguilar [phone]       [address]  [place] [address]        Thank you!     Thank you for choosing [facility]  for your care. Our goal is  always to provide you with excellent care. Hearing back from our patients is one way we can continue to improve our services. Please take a few minutes to complete the written survey that you may receive in the mail after your visit with us. Thank you!             Your Updated Medication List - Protect others around you: Learn how to safely use, store and throw away your medicines at www.disposemymeds.org.          This list is accurate as of: 4/21/17 11:59 PM.  Always use your most recent med list.                   Brand Name Dispense Instructions for use    albuterol 108 (90 BASE) MCG/ACT Inhaler    PROAIR HFA/PROVENTIL HFA/VENTOLIN HFA    3 Inhaler    Inhale 2 puffs into the lungs every 6 hours as needed for shortness of breath / dyspnea       azithromycin 250 MG tablet    ZITHROMAX    6 tablet    Two tablets first day, then one tablet daily for four days.       calcium 600 MG tablet     60 tablet    Take 1 tablet (600 mg) by mouth 2 times daily       dexlansoprazole 30 MG Cpdr CR capsule    DEXILANT    90 capsule    Take 1 capsule (30 mg) by mouth daily       fluticasone-salmeterol 250-50 MCG/DOSE diskus inhaler    ADVAIR    1 Inhaler    Inhale 1 puff into the lungs 2 times daily       furosemide 20 MG tablet    LASIX    30 tablet    Take 1 tablet (20 mg) by mouth daily as needed       ketotifen 0.025 % Soln ophthalmic solution    ZADITOR    1 Bottle    Place 1 drop into both eyes every 12 hours       montelukast 10 MG tablet    SINGULAIR    90 tablet    Take 1 tablet (10 mg) by mouth At Bedtime       potassium chloride 10 MEQ tablet    K-TAB,KLOR-CON    30 tablet    Take 1 tablet (10 mEq) by mouth daily as needed for potassium supplementation       sucralfate 1 GM tablet    CARAFATE    40 tablet    Take 1 tablet (1 g) by mouth 4 times daily       vitamin D 2000 UNITS tablet     100 tablet    Take 6000 IU daily for 6 weeks then take 2000 IU daily therafter

## 2017-04-22 ASSESSMENT — ASTHMA QUESTIONNAIRES: ACT_TOTALSCORE: 20

## 2017-04-24 NOTE — TELEPHONE ENCOUNTER
I never received a f/u call as I requested on Thursday, and when I attempted to call back [facility] was closed. Spoke with representative today who states the walker is ordered to be delivered to patient tomorrow. Asked if there was a supervisor I could speak to regarding dissatisfaction with their service. She advised me that Nathaniel was the supervisor and there was no one above him that I could speak to. Put in a request for Nathaniel to call me back today to discuss this patients order status further.   Stephanie Asif RN   [facility] - Triage

## 2017-04-24 NOTE — PROGRESS NOTES
Faxed preop report to Hospital/Surgical Facility: [facility]  Fax number for surgical facility: [phone] on April 24, 2017.

## 2017-04-24 NOTE — TELEPHONE ENCOUNTER
Spoke with Nathaniel at [facility] who assured me that the patient will have her walker by tomorrow 4/25/17.   Stephanie Asif RN   [facility] - Triage

## 2017-04-28 ENCOUNTER — DOCUMENTATION ONLY (OUTPATIENT)
Dept: CARE COORDINATION | Facility: CLINIC | Age: 52
End: 2017-04-28

## 2017-04-28 DIAGNOSIS — B37.2 SKIN CANDIDIASIS: Primary | ICD-10-CM

## 2017-04-28 RX ORDER — NYSTATIN 100000 [USP'U]/G
POWDER TOPICAL 3 TIMES DAILY PRN
Qty: 30 G | Refills: 3 | Status: SHIPPED | OUTPATIENT
Start: 2017-04-28

## 2017-04-28 NOTE — PROGRESS NOTES
[facility] now requests orders and shares plan of care/discharge summaries for some patients through [facility].  Please REPLY TO THIS MESSAGE in order to give authorization for orders when needed.  This is considered a verbal order, you will still receive a faxed copy of orders for signature.  Thank you for your assistance in improving collaboration for our patients.    ORDER  Patient reports that she has had some itching and odor under her breasts and abdominal folds. Request for nystatin powder be sent to [facility] pharmacy in [place].

## 2017-05-07 DIAGNOSIS — K26.9 DUODENAL ULCER WITHOUT HEMORRHAGE OR PERFORATION AND WITHOUT OBSTRUCTION: ICD-10-CM

## 2017-05-08 RX ORDER — SUCRALFATE 1 G/1
TABLET ORAL
Qty: 40 TABLET | Refills: 3 | Status: SHIPPED | OUTPATIENT
Start: 2017-05-08 | End: 2017-08-18

## 2017-05-08 NOTE — TELEPHONE ENCOUNTER
Carafate  Last Written Prescription Date: 3/16/17  Last Fill Quantity: 40,  # refills: 1   Last Office Visit with [facility] or [facility] prescribing provider: 4/21/17                                             LELIA Farah LPN

## 2017-05-08 NOTE — TELEPHONE ENCOUNTER
Refill approved through [facility] protocol.  Vida Levine RN  [facility]  [phone]

## 2017-05-15 ENCOUNTER — TRANSFERRED RECORDS (OUTPATIENT)
Dept: HEALTH INFORMATION MANAGEMENT | Facility: CLINIC | Age: 52
End: 2017-05-15

## 2017-05-17 DIAGNOSIS — K26.9 DUODENAL ULCER WITHOUT HEMORRHAGE OR PERFORATION AND WITHOUT OBSTRUCTION: ICD-10-CM

## 2017-05-18 NOTE — TELEPHONE ENCOUNTER
Dexilant 30 mg     (Not on current med list)  Last Written Prescription Date: 11/8/16  Last Fill Quantity: 90,  # refills: 1   Last Office Visit with [facility], [facility] or [facility] prescribing provider: 4/21/17

## 2017-05-22 ENCOUNTER — DOCUMENTATION ONLY (OUTPATIENT)
Dept: CARE COORDINATION | Facility: CLINIC | Age: 52
End: 2017-05-22

## 2017-05-22 NOTE — PROGRESS NOTES
[facility] now requests orders and shares plan of care/discharge summaries for some patients through [employer].  Please REPLY TO THIS MESSAGE in order to give authorization for orders when needed.  This is considered a verbal order, you will still receive a faxed copy of orders for signature.  Thank you for your assistance in improving collaboration for our patients.    ORDER  Recertification Order Request  Requesting skilled nursing 2w2, 1w2 for pain management and medication teaching, and HHA 2w2 for bathing and personal cares.

## 2017-05-22 NOTE — PROGRESS NOTES
Agree with the orders    Willi Aguilar MD  [facility] [place]

## 2017-05-23 ENCOUNTER — OFFICE VISIT (OUTPATIENT)
Dept: FAMILY MEDICINE | Facility: CLINIC | Age: 52
End: 2017-05-23
Payer: MEDICARE

## 2017-05-23 VITALS
TEMPERATURE: 98 F | HEART RATE: 84 BPM | OXYGEN SATURATION: 99 % | DIASTOLIC BLOOD PRESSURE: 74 MMHG | SYSTOLIC BLOOD PRESSURE: 118 MMHG | BODY MASS INDEX: 50.41 KG/M2 | WEIGHT: 267 LBS | HEIGHT: 61 IN

## 2017-05-23 DIAGNOSIS — K21.9 GASTROESOPHAGEAL REFLUX DISEASE WITHOUT ESOPHAGITIS: ICD-10-CM

## 2017-05-23 DIAGNOSIS — F32.2 MAJOR DEPRESSIVE DISORDER, SINGLE EPISODE, SEVERE WITHOUT PSYCHOTIC FEATURES (H): ICD-10-CM

## 2017-05-23 DIAGNOSIS — E66.01 MORBID OBESITY, UNSPECIFIED OBESITY TYPE (H): ICD-10-CM

## 2017-05-23 DIAGNOSIS — K26.9 DUODENAL ULCER WITHOUT HEMORRHAGE OR PERFORATION AND WITHOUT OBSTRUCTION: ICD-10-CM

## 2017-05-23 DIAGNOSIS — G47.33 OSA (OBSTRUCTIVE SLEEP APNEA): ICD-10-CM

## 2017-05-23 DIAGNOSIS — J45.20 INTERMITTENT ASTHMA, UNCOMPLICATED: ICD-10-CM

## 2017-05-23 DIAGNOSIS — J01.90 ACUTE SINUSITIS WITH SYMPTOMS > 10 DAYS: ICD-10-CM

## 2017-05-23 DIAGNOSIS — Z01.818 PREOP GENERAL PHYSICAL EXAM: Primary | ICD-10-CM

## 2017-05-23 LAB — HGB BLD-MCNC: 12.3 G/DL (ref 11.7–15.7)

## 2017-05-23 PROCEDURE — 99214 OFFICE O/P EST MOD 30 MIN: CPT | Performed by: FAMILY MEDICINE

## 2017-05-23 PROCEDURE — 80053 COMPREHEN METABOLIC PANEL: CPT | Performed by: FAMILY MEDICINE

## 2017-05-23 PROCEDURE — 85018 HEMOGLOBIN: CPT | Performed by: FAMILY MEDICINE

## 2017-05-23 PROCEDURE — 36415 COLL VENOUS BLD VENIPUNCTURE: CPT | Performed by: FAMILY MEDICINE

## 2017-05-23 RX ORDER — DICLOFENAC SODIUM 75 MG/1
TABLET, DELAYED RELEASE ORAL
COMMUNITY
Start: 2017-05-16 | End: 2017-08-18

## 2017-05-23 RX ORDER — HYDROCODONE BITARTRATE AND ACETAMINOPHEN 5; 325 MG/1; MG/1
TABLET ORAL
COMMUNITY
Start: 2017-05-15 | End: 2017-08-04

## 2017-05-23 RX ORDER — DEXLANSOPRAZOLE 30 MG/1
30 CAPSULE, DELAYED RELEASE ORAL DAILY
Qty: 90 CAPSULE | Refills: 1 | Status: SHIPPED | OUTPATIENT
Start: 2017-05-23

## 2017-05-23 RX ORDER — IBUPROFEN 800 MG/1
TABLET, FILM COATED ORAL
COMMUNITY
Start: 2017-05-03 | End: 2017-08-18

## 2017-05-23 RX ORDER — AZITHROMYCIN 250 MG/1
TABLET, FILM COATED ORAL
Qty: 6 TABLET | Refills: 0 | Status: ON HOLD | OUTPATIENT
Start: 2017-05-23 | End: 2017-05-26

## 2017-05-23 NOTE — LETTER
[facility]          [address] [place] [address]                            [phone]  Fax: [phone]  May 24, 2017     Tish De León  [address] [place] [address]      Dear Tish,    I have reviewed your recent labs. Here are the results:    -All of your labs are normal.  - Glucose is marked high, but likely as you were not fasting for the labs.  Results for orders placed or performed in visit on 05/23/17   Comprehensive metabolic panel   Result Value Ref Range    Sodium 142 133 - 144 mmol/L    Potassium 3.5 3.4 - 5.3 mmol/L    Chloride 109 94 - 109 mmol/L    Carbon Dioxide 20 20 - 32 mmol/L    Anion Gap 13 3 - 14 mmol/L    Glucose 135 (H) 70 - 99 mg/dL    Urea Nitrogen 11 7 - 30 mg/dL    Creatinine 0.80 0.52 - 1.04 mg/dL    GFR Estimate 75 >60 mL/min/1.7m2    GFR Estimate If Black >90   GFR Calc   >60 mL/min/1.7m2    Calcium 9.2 8.5 - 10.1 mg/dL    Bilirubin Total 0.3 0.2 - 1.3 mg/dL    Albumin 3.7 3.4 - 5.0 g/dL    Protein Total 7.8 6.8 - 8.8 g/dL    Alkaline Phosphatase 108 40 - 150 U/L    ALT 33 0 - 50 U/L    AST 15 0 - 45 U/L   Hemoglobin   Result Value Ref Range    Hemoglobin 12.3 11.7 - 15.7 g/dL         Thank you for choosing [facility].  We appreciate the opportunity to serve you and look forward to supporting your healthcare needs in the future.    If you have any questions or concerns, please call me or my staff at [phone].      Sincerely,      Jeff Márquez M.D.

## 2017-05-23 NOTE — MR AVS SNAPSHOT
After Visit Summary   5/23/2017    Tish De León    MRN: [id number]           Patient Information     Date Of Birth          1965        Visit Information        Provider Department      5/23/2017 12:20 PM Willi Aguilar MD [facility]        Today's Diagnoses     Preop general physical exam    -  1    Intermittent asthma, uncomplicated        Gastroesophageal reflux disease without esophagitis        Morbid obesity, unspecified obesity type (H)        ZOE (obstructive sleep apnea)        Major depressive disorder, single episode, severe without psychotic features (H)        Acute sinusitis with symptoms > 10 days        Duodenal ulcer without hemorrhage or perforation and without obstruction          Care Instructions      Before Your Surgery      Call your surgeon if there is any change in your health. This includes signs of a cold or flu (such as a sore throat, runny nose, cough, rash or fever).    Do not smoke, drink alcohol or take over the counter medicine (unless your surgeon or primary care doctor tells you to) for the 24 hours before and after surgery.    If you take prescribed drugs: Follow your doctor s orders about which medicines to take and which to stop until after surgery.    Eating and drinking prior to surgery: follow the instructions from your surgeon    Take a shower or bath the night before surgery. Use the soap your surgeon gave you to gently clean your skin. If you do not have soap from your surgeon, use your regular soap. Do not shave or scrub the surgery site.  Wear clean pajamas and have clean sheets on your bed.           Follow-ups after your visit        Your next 10 appointments already scheduled     May 26, 2017   Procedure with Meagan Ott MD   [facility] ([facility])    [address]  [place] [address]   [phone]           [facility] is located at [address] [place]     "        2017  1:30 PM CDT   New Visit with Polo Patel MD   [facility] [place] ([facility] [place])    [address]  [place] [address] [phone]              Who to contact     If you have questions or need follow up information about today's clinic visit or your schedule please contact [facility] [place] directly at [phone].  Normal or non-critical lab and imaging results will be communicated to you by Updaterhart, letter or phone within 4 business days after the clinic has received the results. If you do not hear from us within 7 days, please contact the clinic through MyChart or phone. If you have a critical or abnormal lab result, we will notify you by phone as soon as possible.  Submit refill requests through [employer] or call your pharmacy and they will forward the refill request to us. Please allow 3 business days for your refill to be completed.          Additional Information About Your Visit        [employer] Information     [employer] lets you send messages to your doctor, view your test results, renew your prescriptions, schedule appointments and more. To sign up, go to [email or web address] . Click on \"Log in\" on the left side of the screen, which will take you to the Welcome page. Then click on \"Sign up Now\" on the right side of the page.     You will be asked to enter the access code listed below, as well as some personal information. Please follow the directions to create your username and password.     Your access code is: [email or web address]  Expires: 2017  8:37 AM     Your access code will  in 90 days. If you need help or a new code, please call your [place] clinic or [phone].        Care EveryWhere ID     This is your Care EveryWhere ID. This could be used by other organizations to access your [place] medical records  [id number]        Your Vitals Were     Pulse Temperature Height Pulse Oximetry BMI (Body Mass Index)    " "   84 98  F (36.7  C) (Tympanic) 5' 1\" (1.549 m) 99% 50.45 kg/m2        Blood Pressure from Last 3 Encounters:   05/23/17 118/74   04/21/17 130/76   04/06/17 135/72    Weight from Last 3 Encounters:   05/23/17 267 lb (121.1 kg)   04/21/17 263 lb (119.3 kg)   04/06/17 260 lb (117.9 kg)              We Performed the Following     Comprehensive metabolic panel     Hemoglobin          Today's Medication Changes          These changes are accurate as of: 5/23/17 11:59 PM.  If you have any questions, ask your nurse or doctor.               Start taking these medicines.        Dose/Directions    azithromycin 250 MG tablet   Commonly known as:  ZITHROMAX   Used for:  Acute sinusitis with symptoms > 10 days   Started by:  Willi Aguilar MD        Two tablets first day, then one tablet daily for four days.   Quantity:  6 tablet   Refills:  0            Where to get your medicines      Some of these will need a paper prescription and others can be bought over the counter.  Ask your nurse if you have questions.     Bring a paper prescription for each of these medications     azithromycin 250 MG tablet    dexlansoprazole 30 MG Cpdr CR capsule                Primary Care Provider Office Phone # Fax #    Willi Aguilar [phone]       [address]  [place] [address]        Thank you!     Thank you for choosing [facility]  for your care. Our goal is always to provide you with excellent care. Hearing back from our patients is one way we can continue to improve our services. Please take a few minutes to complete the written survey that you may receive in the mail after your visit with us. Thank you!             Your Updated Medication List - Protect others around you: Learn how to safely use, store and throw away your medicines at www.disposemymeds.org.          This list is accurate as of: 5/23/17 11:59 PM.  Always use your most recent med list.          "          Brand Name Dispense Instructions for use    albuterol 108 (90 BASE) MCG/ACT Inhaler    PROAIR HFA/PROVENTIL HFA/VENTOLIN HFA    3 Inhaler    Inhale 2 puffs into the lungs every 6 hours as needed for shortness of breath / dyspnea       azithromycin 250 MG tablet    ZITHROMAX    6 tablet    Two tablets first day, then one tablet daily for four days.       calcium 600 MG tablet     60 tablet    Take 1 tablet (600 mg) by mouth 2 times daily       dexlansoprazole 30 MG Cpdr CR capsule    DEXILANT    90 capsule    Take 1 capsule (30 mg) by mouth daily       diclofenac 75 MG EC tablet    VOLTAREN         fluticasone-salmeterol 250-50 MCG/DOSE diskus inhaler    ADVAIR    1 Inhaler    Inhale 1 puff into the lungs 2 times daily       furosemide 20 MG tablet    LASIX    30 tablet    Take 1 tablet (20 mg) by mouth daily as needed       HYDROcodone-acetaminophen 5-325 MG per tablet    NORCO         ibuprofen 800 MG tablet    ADVIL/MOTRIN         ketotifen 0.025 % Soln ophthalmic solution    ZADITOR    1 Bottle    Place 1 drop into both eyes every 12 hours       montelukast 10 MG tablet    SINGULAIR    90 tablet    Take 1 tablet (10 mg) by mouth At Bedtime       nystatin 545817 UNIT/GM Powd    MYCOSTATIN    30 g    Apply topically 3 times daily as needed       potassium chloride 10 MEQ tablet    K-TAB,KLOR-CON    30 tablet    Take 1 tablet (10 mEq) by mouth daily as needed for potassium supplementation       sucralfate 1 GM tablet    CARAFATE    40 tablet    Take 1 tablet (1 g) by mouth 4 times daily       vitamin D 2000 UNITS tablet     100 tablet    Take 6000 IU daily for 6 weeks then take 2000 IU daily therafter

## 2017-05-23 NOTE — PROGRESS NOTES
[facility] [place]  [address] [address]  [phone]  Dept: [phone]    PRE-OP EVALUATION:  Today's date: 2017    Tish De León (: 1965) presents for pre-operative evaluation assessment as requested by Dr. Meagan Ott.  She requires evaluation and anesthesia risk assessment prior to undergoing surgery/procedure for treatment of  COMBINED ESOPHAGOSCOPY, GASTROSCOPY, DUODENOSCOPY (EGD) (MAC SEDATION) .      Date of Surgery/ Procedure: 17  Time of Surgery/ Procedure: 11AM  Hospital/Surgical Facility: [facility]  Fax number for surgical facility: [facility]  Primary Physician: Willi Aguilar  Type of Anesthesia Anticipated: Local with MAC    Patient has a Health Care Directive or Living Will:  NO    1. NO - Do you have a history of heart attack, stroke, stent, bypass or surgery on an artery in the head, neck, heart or legs?  2. NO - Do you ever have any pain or discomfort in your chest?  3. YES 1993 - Do you have a history of  Heart Failure?  4. YES due to Asthma - Are you troubled by shortness of breath when: walking on the level, up a slight hill or at night?  5. NO - Do you currently have a cold, bronchitis or other respiratory infection?  6. NO - Do you have a cough, shortness of breath or wheezing?  7. NO - Do you sometimes get pains in the calves of your legs when you walk?  8. NO - Do you or anyone in your family have previous history of blood clots?  9. NO - Do you or does anyone in your family have a serious bleeding problem such as prolonged bleeding following surgeries or cuts?  10. YES - Have you ever had problems with anemia or been told to take iron pills?  11. NO - Have you had any abnormal blood loss such as black, tarry or bloody stools, or abnormal vaginal bleeding?  12. NO - Have you ever had a blood transfusion?  13. NO - Have you or any of your relatives ever had problems with anesthesia?  14. YES Sleep Apnea - Do you have  sleep apnea, excessive snoring or daytime drowsiness?  15. NO - Do you have any prosthetic heart valves?  16. NO - Do you have prosthetic joints?  17. NO - Is there any chance that you may be pregnant?      HPI:                                                          ASTHMA - Patient has a longstanding history of moderate-severe Asthma . Patient has been doing well overall   ACT Total Scores 4/21/2017   ACT TOTAL SCORE (Goal Greater than or Equal to 20) 20   In the past 12 months, how many times did you visit the emergency room for your asthma without being admitted to the hospital? 0   In the past 12 months, how many times were you hospitalized overnight because of your asthma? 0                                                                                                                                                .    MEDICAL HISTORY:                                                      Patient Active Problem List    Diagnosis Date Noted     Gastroesophageal reflux disease without esophagitis 04/21/2017     Priority: Medium     Intermittent asthma, uncomplicated 04/21/2017     Priority: Medium     Primary osteoarthritis of right knee 04/11/2017     Priority: Medium     Prediabetes 03/29/2017     Priority: Medium     Duodenal ulcer without hemorrhage or perforation and without obstruction 08/25/2016     Priority: Medium     Advanced directives, counseling/discussion 08/25/2016     Priority: Medium     Binge eating disorder 08/25/2016     Priority: Medium     Morbid obesity, unspecified obesity type (H) 03/22/2016     Priority: Medium     Sickle cell trait (H) 08/21/2012     Priority: Medium     Anxiety 08/21/2012     Priority: Medium     Dependent edema 08/10/2012     Priority: Medium     Vitamin D deficiency disease 03/30/2012     Priority: Medium     Major depressive disorder, single episode, severe (H) 03/27/2012     Priority: Medium     Problem list name updated by automated process. Provider to review        ZOE (obstructive sleep apnea) 2012     Priority: Medium     Uses CPAP        Past Medical History:   Diagnosis Date     Abnormal glucose 3/29/2017     Acute abdominal pain 2012     Anxiety 2012     Arthritis      Asthma      Cardiomyopathy, peripartum, postpartum      CHF (congestive heart failure) (H)     normal echo      Degenerative disc disease, thoracic      Dependent edema 8/10/2012     Depressive disorder      Family history of thyroid disease 3/29/2017     GERD (gastroesophageal reflux disease)      Hyperlipidemia      Obesity      Sickle cell trait (H) 2012     Sleep apnea     CPAP     Ulcer (H)      Past Surgical History:   Procedure Laterality Date      SECTION  1993    x4     CHOLECYSTECTOMY  2015     ESOPHAGOSCOPY, GASTROSCOPY, DUODENOSCOPY (EGD), COMBINED  2013    Procedure: COMBINED ESOPHAGOSCOPY, GASTROSCOPY, DUODENOSCOPY (EGD), BIOPSY SINGLE OR MULTIPLE;  COMBINED ESOPHAGOSCOPY, GASTROSCOPY, DUODENOSCOPY (EGD);  Surgeon: Luis Ma MD;  Location:  GI     ESOPHAGOSCOPY, GASTROSCOPY, DUODENOSCOPY (EGD), COMBINED N/A 2016    Procedure: COMBINED ESOPHAGOSCOPY, GASTROSCOPY, DUODENOSCOPY (EGD);  Surgeon: Kirit Hutchinson MD;  Location:  GI     ESOPHAGOSCOPY, GASTROSCOPY, DUODENOSCOPY (EGD), COMBINED N/A 2016    Procedure: COMBINED ESOPHAGOSCOPY, GASTROSCOPY, DUODENOSCOPY (EGD), BIOPSY SINGLE OR MULTIPLE;  Surgeon: Kirit Hutchinson MD;  Location:  GI     SINUS SURGERY  10/2011    Nasal surgery     Current Outpatient Prescriptions   Medication Sig Dispense Refill     HYDROcodone-acetaminophen (NORCO) 5-325 MG per tablet        ibuprofen (ADVIL/MOTRIN) 800 MG tablet        diclofenac (VOLTAREN) 75 MG EC tablet        azithromycin (ZITHROMAX) 250 MG tablet Two tablets first day, then one tablet daily for four days. 6 tablet 0     dexlansoprazole (DEXILANT) 30 MG CPDR CR capsule Take 1 capsule (30 mg) by mouth daily 90 capsule 1      sucralfate (CARAFATE) 1 GM tablet Take 1 tablet (1 g) by mouth 4 times daily 40 tablet 3     nystatin (MYCOSTATIN) 979299 UNIT/GM POWD Apply topically 3 times daily as needed 30 g 3     calcium carbonate 600 MG tablet Take 1 tablet (600 mg) by mouth 2 times daily 60 tablet 11     furosemide (LASIX) 20 MG tablet Take 1 tablet (20 mg) by mouth daily as needed 30 tablet 3     potassium chloride (K-TAB,KLOR-CON) 10 MEQ tablet Take 1 tablet (10 mEq) by mouth daily as needed for potassium supplementation 30 tablet 3     montelukast (SINGULAIR) 10 MG tablet Take 1 tablet (10 mg) by mouth At Bedtime 90 tablet 3     Cholecalciferol (VITAMIN D) 2000 UNITS tablet Take 6000 IU daily for 6 weeks then take 2000 IU daily therafter 100 tablet 3     fluticasone-salmeterol (ADVAIR) 250-50 MCG/DOSE diskus inhaler Inhale 1 puff into the lungs 2 times daily 1 Inhaler 3     albuterol (PROAIR HFA/PROVENTIL HFA/VENTOLIN HFA) 108 (90 BASE) MCG/ACT Inhaler Inhale 2 puffs into the lungs every 6 hours as needed for shortness of breath / dyspnea 3 Inhaler 1     ketotifen (ZADITOR) 0.025 % SOLN Place 1 drop into both eyes every 12 hours 1 Bottle 11     OTC products: None, except as noted above    Allergies   Allergen Reactions     Lactose      Other reaction(s): Intolerance-Can't Take     No Clinical Screening - See Comments      PN: LW Other1: -Milk = GI upset  PN: LW FI1: ice cream,peanuts,pork \T\ more w/testing  LW FI2:      Latex Allergy: NO    Social History   Substance Use Topics     Smoking status: Never Smoker     Smokeless tobacco: Never Used     Alcohol use No     History   Drug Use No       REVIEW OF SYSTEMS:                                                    C: NEGATIVE for fever, chills, change in weight  I: NEGATIVE for worrisome rashes, moles or lesions  E: NEGATIVE for vision changes or irritation  E/M: sinus pressure and post nasal drainage.   R: NEGATIVE for significant cough or SOB  B: NEGATIVE for masses, tenderness or  "discharge  CV: NEGATIVE for chest pain, palpitations or peripheral edema  GI: NEGATIVE  change in bowel habits  : NEGATIVE for frequency, dysuria, or hematuria  M: NEGATIVE for significant arthralgias or myalgia  N: NEGATIVE for weakness, dizziness or paresthesias  E: NEGATIVE for temperature intolerance, skin/hair changes  H: NEGATIVE for bleeding problems  P: NEGATIVE for changes in mood or affect    EXAM:                                                    /74  Pulse 84  Temp 98  F (36.7  C) (Tympanic)  Ht 5' 1\" (1.549 m)  Wt 267 lb (121.1 kg)  SpO2 99%  BMI 50.45 kg/m2    GENERAL APPEARANCE: healthy, alert and no distress     EYES: EOMI, PERRL     HENT: ear canals and TM's normal and nose and mouth without ulcers or lesions. Maxillary sinus tenderness noted.      NECK: no adenopathy, no asymmetry, masses, or scars and thyroid normal to palpation     RESP: lungs clear to auscultation - no rales, rhonchi or wheezes     CV: regular rates and rhythm, normal S1 S2, no S3 or S4 and no murmur, click or rub     ABDOMEN:  soft, nontender, no HSM or masses and bowel sounds normal     MS: extremities normal- no gross deformities noted, no evidence of inflammation in joints, FROM in all extremities.     SKIN: no suspicious lesions or rashes     NEURO: Normal strength and tone, sensory exam grossly normal, mentation intact and speech normal     PSYCH: mentation appears normal. and affect normal/bright     LYMPHATICS: No axillary, cervical, or supraclavicular nodes    DIAGNOSTICS:                                                        Recent Labs   Lab Test  03/31/17   1928  03/31/17   1843  03/29/17   1325  09/27/16   1200   03/22/16   1338   HGB  12.7  Canceled, Test credited   Unsatisfactory specimen - clotted  ENTERED ONTO ED TRACKBOARD AT 1902     --   12.7   < >  13.5   PLT  278  Canceled, Test credited   Unsatisfactory specimen - clotted  ENTERED ONTO ED TRACKBOARD AT 1902     --   345   < >  189   NA   -- "   142   --   139   < >  138   POTASSIUM   --   4.3   --   4.1   < >  4.0   CR   --   0.80   --   0.75   < >  1.00   A1C   --    --   6.2*   --    --   6.1*    < > = values in this interval not displayed.        IMPRESSION:                                                    Reason for surgery/procedure: ESOPHAGOSCOPY, GASTROSCOPY, DUODENOSCOPY   Diagnosis/reason for consult: Pre op exam    The proposed surgical procedure is considered INTERMEDIATE risk.    REVISED CARDIAC RISK INDEX  The patient has the following serious cardiovascular risks for perioperative complications such as (MI, PE, VFib and 3  AV Block):  No serious cardiac risks  INTERPRETATION: 0 risks: Class I (very low risk - 0.4% complication rate)    The patient has the following additional risks for perioperative complications:  No identified additional risks    1. Preop general physical exam    - Comprehensive metabolic panel  - Hemoglobin    2. Intermittent asthma, uncomplicated  Well controlled    3. Gastroesophageal reflux disease without esophagitis      4. Morbid obesity, unspecified obesity type (H)      5. ZOE (obstructive sleep apnea)      6. Major depressive disorder, single episode, severe without psychotic features (H)      7. Acute sinusitis with symptoms > 10 days  Starting the patient on Zithromycin. Recommended to stay well hydrated. If symptoms are not improving in 2-3 days, instructed to notify me back.   - azithromycin (ZITHROMAX) 250 MG tablet; Two tablets first day, then one tablet daily for four days.  Dispense: 6 tablet; Refill: 0    8. Duodenal ulcer without hemorrhage or perforation and without obstruction    - dexlansoprazole (DEXILANT) 30 MG CPDR CR capsule; Take 1 capsule (30 mg) by mouth daily  Dispense: 90 capsule; Refill: 1      RECOMMENDATIONS:                                                        APPROVAL GIVEN to proceed with proposed procedure, without further diagnostic evaluation       Signed Electronically by:  Willi Aguilar MD    Copy of this evaluation report is provided to requesting physician.    [place] Preop Guidelines

## 2017-05-23 NOTE — NURSING NOTE
"Chief Complaint   Patient presents with     Pre-Op Exam       Initial /74  Pulse 84  Temp 98  F (36.7  C) (Tympanic)  Ht 5' 1\" (1.549 m)  Wt 267 lb (121.1 kg)  SpO2 99%  BMI 50.45 kg/m2 Estimated body mass index is 50.45 kg/(m^2) as calculated from the following:    Height as of this encounter: 5' 1\" (1.549 m).    Weight as of this encounter: 267 lb (121.1 kg).  Medication Reconciliation: complete  "

## 2017-05-24 LAB
ALBUMIN SERPL-MCNC: 3.7 G/DL (ref 3.4–5)
ALP SERPL-CCNC: 108 U/L (ref 40–150)
ALT SERPL W P-5'-P-CCNC: 33 U/L (ref 0–50)
ANION GAP SERPL CALCULATED.3IONS-SCNC: 13 MMOL/L (ref 3–14)
AST SERPL W P-5'-P-CCNC: 15 U/L (ref 0–45)
BILIRUB SERPL-MCNC: 0.3 MG/DL (ref 0.2–1.3)
BUN SERPL-MCNC: 11 MG/DL (ref 7–30)
CALCIUM SERPL-MCNC: 9.2 MG/DL (ref 8.5–10.1)
CHLORIDE SERPL-SCNC: 109 MMOL/L (ref 94–109)
CO2 SERPL-SCNC: 20 MMOL/L (ref 20–32)
CREAT SERPL-MCNC: 0.8 MG/DL (ref 0.52–1.04)
GFR SERPL CREATININE-BSD FRML MDRD: 75 ML/MIN/1.7M2
GLUCOSE SERPL-MCNC: 135 MG/DL (ref 70–99)
POTASSIUM SERPL-SCNC: 3.5 MMOL/L (ref 3.4–5.3)
PROT SERPL-MCNC: 7.8 G/DL (ref 6.8–8.8)
SODIUM SERPL-SCNC: 142 MMOL/L (ref 133–144)

## 2017-05-26 ENCOUNTER — SURGERY (OUTPATIENT)
Age: 52
End: 2017-05-26

## 2017-05-26 ENCOUNTER — TRANSFERRED RECORDS (OUTPATIENT)
Dept: HEALTH INFORMATION MANAGEMENT | Facility: CLINIC | Age: 52
End: 2017-05-26

## 2017-05-26 ENCOUNTER — ANESTHESIA (OUTPATIENT)
Dept: GASTROENTEROLOGY | Facility: CLINIC | Age: 52
End: 2017-05-26
Payer: MEDICARE

## 2017-05-26 ENCOUNTER — HOSPITAL ENCOUNTER (OUTPATIENT)
Facility: CLINIC | Age: 52
Discharge: HOME | End: 2017-05-26
Attending: INTERNAL MEDICINE | Admitting: INTERNAL MEDICINE
Payer: MEDICARE

## 2017-05-26 ENCOUNTER — ANESTHESIA EVENT (OUTPATIENT)
Dept: GASTROENTEROLOGY | Facility: CLINIC | Age: 52
End: 2017-05-26
Payer: MEDICARE

## 2017-05-26 VITALS
OXYGEN SATURATION: 96 % | HEIGHT: 61 IN | WEIGHT: 267 LBS | RESPIRATION RATE: 16 BRPM | DIASTOLIC BLOOD PRESSURE: 68 MMHG | SYSTOLIC BLOOD PRESSURE: 120 MMHG | BODY MASS INDEX: 50.41 KG/M2

## 2017-05-26 LAB — UPPER GI ENDOSCOPY: NORMAL

## 2017-05-26 PROCEDURE — 88305 TISSUE EXAM BY PATHOLOGIST: CPT | Mod: 26 | Performed by: INTERNAL MEDICINE

## 2017-05-26 PROCEDURE — 40000010 ZZH STATISTIC ANES STAT CODE-CRNA PER MINUTE: Performed by: INTERNAL MEDICINE

## 2017-05-26 PROCEDURE — 37000008 ZZH ANESTHESIA TECHNICAL FEE, 1ST 30 MIN: Performed by: INTERNAL MEDICINE

## 2017-05-26 PROCEDURE — 25000128 H RX IP 250 OP 636: Performed by: NURSE ANESTHETIST, CERTIFIED REGISTERED

## 2017-05-26 PROCEDURE — 25000128 H RX IP 250 OP 636: Performed by: INTERNAL MEDICINE

## 2017-05-26 PROCEDURE — 88305 TISSUE EXAM BY PATHOLOGIST: CPT | Performed by: INTERNAL MEDICINE

## 2017-05-26 PROCEDURE — 25000125 ZZHC RX 250: Performed by: NURSE ANESTHETIST, CERTIFIED REGISTERED

## 2017-05-26 PROCEDURE — 43239 EGD BIOPSY SINGLE/MULTIPLE: CPT | Performed by: INTERNAL MEDICINE

## 2017-05-26 PROCEDURE — 37000009 ZZH ANESTHESIA TECHNICAL FEE, EACH ADDTL 15 MIN: Performed by: INTERNAL MEDICINE

## 2017-05-26 RX ORDER — FLUMAZENIL 0.1 MG/ML
0.2 INJECTION, SOLUTION INTRAVENOUS
Status: DISCONTINUED | OUTPATIENT
Start: 2017-05-26 | End: 2017-05-26 | Stop reason: HOSPADM

## 2017-05-26 RX ORDER — LIDOCAINE HYDROCHLORIDE 20 MG/ML
INJECTION, SOLUTION INFILTRATION; PERINEURAL PRN
Status: DISCONTINUED | OUTPATIENT
Start: 2017-05-26 | End: 2017-05-26

## 2017-05-26 RX ORDER — PROPOFOL 10 MG/ML
INJECTION, EMULSION INTRAVENOUS CONTINUOUS PRN
Status: DISCONTINUED | OUTPATIENT
Start: 2017-05-26 | End: 2017-05-26

## 2017-05-26 RX ORDER — NALOXONE HYDROCHLORIDE 0.4 MG/ML
.1-.4 INJECTION, SOLUTION INTRAMUSCULAR; INTRAVENOUS; SUBCUTANEOUS
Status: DISCONTINUED | OUTPATIENT
Start: 2017-05-26 | End: 2017-05-26 | Stop reason: HOSPADM

## 2017-05-26 RX ORDER — ONDANSETRON 4 MG/1
4 TABLET, ORALLY DISINTEGRATING ORAL EVERY 6 HOURS PRN
Status: DISCONTINUED | OUTPATIENT
Start: 2017-05-26 | End: 2017-05-26 | Stop reason: HOSPADM

## 2017-05-26 RX ORDER — ONDANSETRON 2 MG/ML
4 INJECTION INTRAMUSCULAR; INTRAVENOUS
Status: COMPLETED | OUTPATIENT
Start: 2017-05-26 | End: 2017-05-26

## 2017-05-26 RX ORDER — SODIUM CHLORIDE, SODIUM LACTATE, POTASSIUM CHLORIDE, CALCIUM CHLORIDE 600; 310; 30; 20 MG/100ML; MG/100ML; MG/100ML; MG/100ML
INJECTION, SOLUTION INTRAVENOUS CONTINUOUS PRN
Status: DISCONTINUED | OUTPATIENT
Start: 2017-05-26 | End: 2017-05-26

## 2017-05-26 RX ORDER — LIDOCAINE 40 MG/G
CREAM TOPICAL
Status: DISCONTINUED | OUTPATIENT
Start: 2017-05-26 | End: 2017-05-26 | Stop reason: HOSPADM

## 2017-05-26 RX ORDER — ONDANSETRON 2 MG/ML
4 INJECTION INTRAMUSCULAR; INTRAVENOUS EVERY 6 HOURS PRN
Status: DISCONTINUED | OUTPATIENT
Start: 2017-05-26 | End: 2017-05-26 | Stop reason: HOSPADM

## 2017-05-26 RX ADMIN — MIDAZOLAM HYDROCHLORIDE 2 MG: 1 INJECTION, SOLUTION INTRAMUSCULAR; INTRAVENOUS at 10:26

## 2017-05-26 RX ADMIN — SODIUM CHLORIDE, POTASSIUM CHLORIDE, SODIUM LACTATE AND CALCIUM CHLORIDE: 600; 310; 30; 20 INJECTION, SOLUTION INTRAVENOUS at 10:24

## 2017-05-26 RX ADMIN — DEXMEDETOMIDINE HYDROCHLORIDE 12 MCG: 100 INJECTION, SOLUTION INTRAVENOUS at 10:30

## 2017-05-26 RX ADMIN — ONDANSETRON 4 MG: 2 INJECTION INTRAMUSCULAR; INTRAVENOUS at 10:37

## 2017-05-26 RX ADMIN — DEXMEDETOMIDINE HYDROCHLORIDE 8 MCG: 100 INJECTION, SOLUTION INTRAVENOUS at 10:27

## 2017-05-26 RX ADMIN — LIDOCAINE HYDROCHLORIDE 50 MG: 20 INJECTION, SOLUTION INFILTRATION; PERINEURAL at 10:26

## 2017-05-26 RX ADMIN — PROPOFOL 200 MCG/KG/MIN: 10 INJECTION, EMULSION INTRAVENOUS at 10:26

## 2017-05-26 NOTE — ANESTHESIA CARE TRANSFER NOTE
Patient: Tish De León    Procedure(s):  COMBINED ESOPHAGOSCOPY, GASTROSCOPY, DUODENOSCOPY (EGD) (MAC SEDATION) - Wound Class: II-Clean Contaminated    Diagnosis: RIGHT UPPER QUADRANT ABDOMINAL PAIN  Diagnosis Additional Information: No value filed.    Anesthesia Type:   MAC     Note:  Airway :Room Air    Comments:        Transferred to Endo RN. Patient awake and verbal. Spontaneous resp and on room air. Monitors and alarms on. VSS. Report given.      Vitals: (Last set prior to Anesthesia Care Transfer)    CRNA VITALS  5/26/2017 1018 - 5/26/2017 1055      5/26/2017             Resp Rate (set): 10                Electronically Signed By: CYNTHIA Mercer CRNA  May 26, 2017  10:55 AM

## 2017-05-26 NOTE — CONSULTS
Pre-Endoscopy History and Physical     Tish De León MRN# [id number]   YOB: 1965 Age: 52 year old     Date of Procedure: 5/26/2017  Primary care provider: Willi Aguilar  Type of Endoscopy: esophagogastroduodenoscopy (upper GI endoscopy)  Reason for Procedure: abdominal pain  Type of Anesthesia Anticipated: MAC    HPI:    Tish is a 52 year old female who will be undergoing the above procedure.      A history and physical has been performed. The patient's medications and allergies have been reviewed. The risks and benefits of the procedure and the sedation options and risks were discussed with the patient.  All questions were answered and informed consent was obtained.      Allergies   Allergen Reactions     Lactose      Other reaction(s): Intolerance-Can't Take     No Clinical Screening - See Comments      PN: LW Other1: -Milk = GI upset  PN: LW FI1: ice cream,peanuts,pork \T\ more w/testing  LW FI2:        No current facility-administered medications for this encounter.        Patient Active Problem List   Diagnosis     Major depressive disorder, single episode, severe (H)     ZOE (obstructive sleep apnea)     Vitamin D deficiency disease     Dependent edema     Sickle cell trait (H)     Anxiety     Morbid obesity, unspecified obesity type (H)     Duodenal ulcer without hemorrhage or perforation and without obstruction     Advanced directives, counseling/discussion     Binge eating disorder     Prediabetes     Primary osteoarthritis of right knee     Gastroesophageal reflux disease without esophagitis     Intermittent asthma, uncomplicated        Past Medical History:   Diagnosis Date     Abnormal glucose 3/29/2017     Acute abdominal pain 8/14/2012     Anxiety 8/21/2012     Arthritis      Asthma      Cardiomyopathy, peripartum, postpartum 1993     CHF (congestive heart failure) (H)     normal echo 2016     Degenerative disc disease, thoracic      Dependent edema 8/10/2012     Depressive disorder   "    Family history of thyroid disease 3/29/2017     GERD (gastroesophageal reflux disease)      Hyperlipidemia      Obesity      Sickle cell trait (H) 2012     Sleep apnea     CPAP     Ulcer (H)         Past Surgical History:   Procedure Laterality Date      SECTION  1993    x4     CHOLECYSTECTOMY  2015     ESOPHAGOSCOPY, GASTROSCOPY, DUODENOSCOPY (EGD), COMBINED  2013    Procedure: COMBINED ESOPHAGOSCOPY, GASTROSCOPY, DUODENOSCOPY (EGD), BIOPSY SINGLE OR MULTIPLE;  COMBINED ESOPHAGOSCOPY, GASTROSCOPY, DUODENOSCOPY (EGD);  Surgeon: Luis Ma MD;  Location:  GI     ESOPHAGOSCOPY, GASTROSCOPY, DUODENOSCOPY (EGD), COMBINED N/A 2016    Procedure: COMBINED ESOPHAGOSCOPY, GASTROSCOPY, DUODENOSCOPY (EGD);  Surgeon: Kirit Hutchinson MD;  Location:  GI     ESOPHAGOSCOPY, GASTROSCOPY, DUODENOSCOPY (EGD), COMBINED N/A 2016    Procedure: COMBINED ESOPHAGOSCOPY, GASTROSCOPY, DUODENOSCOPY (EGD), BIOPSY SINGLE OR MULTIPLE;  Surgeon: Kirit Hutchinson MD;  Location:  GI     SINUS SURGERY  10/2011    Nasal surgery       Social History   Substance Use Topics     Smoking status: Never Smoker     Smokeless tobacco: Never Used     Alcohol use No       Family History   Problem Relation Age of Onset     Bipolar Disorder Son      Hypertension Mother      Depression Sister      Anxiety Disorder Sister      Anxiety Disorder Paternal Aunt      Depression Paternal Aunt          PHYSICAL EXAM:   /87  Resp 16  Ht 1.549 m (5' 1\")  Wt 121.1 kg (267 lb)  SpO2 97%  BMI 50.45 kg/m2 Estimated body mass index is 50.45 kg/(m^2) as calculated from the following:    Height as of this encounter: 1.549 m (5' 1\").    Weight as of this encounter: 121.1 kg (267 lb).   RESP: lungs clear to auscultation - no rales, rhonchi or wheezes  CV: regular rates and rhythm    IMPRESSION   ASA Class 2 - Mild systemic disease      Signed Electronically by: Meagan Ott MD  May 26, 2017    .          "

## 2017-05-26 NOTE — ANESTHESIA POSTPROCEDURE EVALUATION
Patient: Tish De León    Procedure(s):  COMBINED ESOPHAGOSCOPY, GASTROSCOPY, DUODENOSCOPY (EGD) (MAC SEDATION) - Wound Class: II-Clean Contaminated    Diagnosis:RIGHT UPPER QUADRANT ABDOMINAL PAIN  Diagnosis Additional Information: No value filed.    Anesthesia Type:  MAC    Note:  Anesthesia Post Evaluation    Patient location during evaluation: PACU  Patient participation: Able to fully participate in evaluation  Level of consciousness: awake  Pain management: adequate  Airway patency: patent  Cardiovascular status: acceptable  Respiratory status: acceptable  Hydration status: acceptable  PONV: none     Anesthetic complications: None          Last vitals:  Vitals:    05/26/17 1053 05/26/17 1100 05/26/17 1101   BP:  120/68    Resp:      SpO2: 96% 90% 96%         Electronically Signed By: Trace Laird MD  May 26, 2017  11:33 AM

## 2017-05-26 NOTE — ANESTHESIA PREPROCEDURE EVALUATION
Procedure: Procedure(s):  COMBINED ESOPHAGOSCOPY, GASTROSCOPY, DUODENOSCOPY (EGD)  Preop diagnosis: RIGHT UPPER QUADRANT ABDOMINAL PAIN    Allergies   Allergen Reactions     Lactose      Other reaction(s): Intolerance-Can't Take     No Clinical Screening - See Comments      PN: LW Other1: -Milk = GI upset  PN: LW FI1: ice cream,peanuts,pork \T\ more w/testing  LW FI2:     Past Medical History:   Diagnosis Date     Abnormal glucose 3/29/2017     Acute abdominal pain 2012     Anxiety 2012     Arthritis      Asthma      Cardiomyopathy, peripartum, postpartum      CHF (congestive heart failure) (H)     normal echo      Degenerative disc disease, thoracic      Dependent edema 8/10/2012     Depressive disorder      Family history of thyroid disease 3/29/2017     GERD (gastroesophageal reflux disease)      Hyperlipidemia      Obesity      Sickle cell trait (H) 2012     Sleep apnea     CPAP     Ulcer (H)      Ulcer (H)      Past Surgical History:   Procedure Laterality Date      SECTION  1993    x4     CHOLECYSTECTOMY  2015     ESOPHAGOSCOPY, GASTROSCOPY, DUODENOSCOPY (EGD), COMBINED  2013    Procedure: COMBINED ESOPHAGOSCOPY, GASTROSCOPY, DUODENOSCOPY (EGD), BIOPSY SINGLE OR MULTIPLE;  COMBINED ESOPHAGOSCOPY, GASTROSCOPY, DUODENOSCOPY (EGD);  Surgeon: Luis Ma MD;  Location:  GI     ESOPHAGOSCOPY, GASTROSCOPY, DUODENOSCOPY (EGD), COMBINED N/A 2016    Procedure: COMBINED ESOPHAGOSCOPY, GASTROSCOPY, DUODENOSCOPY (EGD);  Surgeon: Kirit Hutchinson MD;  Location:  GI     ESOPHAGOSCOPY, GASTROSCOPY, DUODENOSCOPY (EGD), COMBINED N/A 2016    Procedure: COMBINED ESOPHAGOSCOPY, GASTROSCOPY, DUODENOSCOPY (EGD), BIOPSY SINGLE OR MULTIPLE;  Surgeon: Kirit Hutchinson MD;  Location:  GI     SINUS SURGERY  10/2011    Nasal surgery     Prior to Admission medications    Medication Sig Start Date End Date Taking? Authorizing Provider   HYDROcodone-acetaminophen (NORCO) 5-325 MG  per tablet  5/15/17  Yes Reported, Patient   ibuprofen (ADVIL/MOTRIN) 800 MG tablet  5/3/17  Yes Reported, Patient   diclofenac (VOLTAREN) 75 MG EC tablet  5/16/17  Yes Reported, Patient   dexlansoprazole (DEXILANT) 30 MG CPDR CR capsule Take 1 capsule (30 mg) by mouth daily 5/23/17  Yes Willi Aguilar MD   sucralfate (CARAFATE) 1 GM tablet Take 1 tablet (1 g) by mouth 4 times daily 5/8/17  Yes Willi Aguilar MD   nystatin (MYCOSTATIN) 919404 UNIT/GM POWD Apply topically 3 times daily as needed 4/28/17  Yes Willi Aguilar MD   calcium carbonate 600 MG tablet Take 1 tablet (600 mg) by mouth 2 times daily 4/21/17  Yes Willi Aguilar MD   furosemide (LASIX) 20 MG tablet Take 1 tablet (20 mg) by mouth daily as needed 3/17/17  Yes Willi Aguilar MD   potassium chloride (K-TAB,KLOR-CON) 10 MEQ tablet Take 1 tablet (10 mEq) by mouth daily as needed for potassium supplementation 3/17/17  Yes Willi Aguilar MD   montelukast (SINGULAIR) 10 MG tablet Take 1 tablet (10 mg) by mouth At Bedtime 3/17/17  Yes Willi Aguilar MD   Cholecalciferol (VITAMIN D) 2000 UNITS tablet Take 6000 IU daily for 6 weeks then take 2000 IU daily therafter 2/21/17  Yes Willi Aguilar MD   fluticasone-salmeterol (ADVAIR) 250-50 MCG/DOSE diskus inhaler Inhale 1 puff into the lungs 2 times daily 2/21/17  Yes Willi Aguilar MD   albuterol (PROAIR HFA/PROVENTIL HFA/VENTOLIN HFA) 108 (90 BASE) MCG/ACT Inhaler Inhale 2 puffs into the lungs every 6 hours as needed for shortness of breath / dyspnea 2/21/17  Yes Wilil Aguilar MD   ketotifen (ZADITOR) 0.025 % SOLN Place 1 drop into both eyes every 12 hours 7/14/16  Yes Aleah Mcmullen PA-C     No current Epic-ordered facility-administered medications on file.      No current [facility]-ordered outpatient prescriptions on file.     Wt Readings from Last 1 Encounters:   05/26/17 121.1 kg (267 lb)     Temp Readings from Last 1 Encounters:   05/23/17 36.7  C (98  F) (Tympanic)     BP Readings from Last 6 Encounters:    05/26/17 134/87   05/23/17 118/74   04/21/17 130/76   04/06/17 135/72   03/31/17 143/68   03/29/17 124/84     Pulse Readings from Last 4 Encounters:   05/23/17 84   04/21/17 88   04/06/17 90   03/31/17 78     Resp Readings from Last 1 Encounters:   05/26/17 16     SpO2 Readings from Last 1 Encounters:   05/26/17 97%     Recent Labs   Lab Test  05/23/17   1245  03/31/17   1843   NA  142  142   POTASSIUM  3.5  4.3   CHLORIDE  109  108   CO2  20  22   ANIONGAP  13  12   GLC  135*  143*   BUN  11  12   CR  0.80  0.80   PAZ  9.2  8.9     Recent Labs   Lab Test  05/23/17   1245  03/31/17   1928  03/31/17   1843   WBC   --   7.9  Canceled, Test credited   Unsatisfactory specimen - clotted  ENTERED ONTO ED TRACKBOARD AT 1902     HGB  12.3  12.7  Canceled, Test credited   Unsatisfactory specimen - clotted  ENTERED ONTO ED TRACKBOARD AT 1902     PLT   --   278  Canceled, Test credited   Unsatisfactory specimen - clotted  ENTERED ONTO ED TRACKBOARD AT 1902       No results for input(s): INR in the last 25162 hours.    Invalid input(s): APTT   RECENT LABS:   ECG:   ECHO:   CXR:    Anesthesia Evaluation     . Pt has had prior anesthetic.     No history of anesthetic complications          ROS/MED HX    ENT/Pulmonary:     (+)sleep apnea, asthma doesn't use CPAP , . .    Neurologic:       Cardiovascular:     (+) ----. : . CHF . . :. .       METS/Exercise Tolerance:     Hematologic:         Musculoskeletal:         GI/Hepatic:     (+) GERD Asymptomatic on medication,       Renal/Genitourinary:         Endo:     (+) Obesity, .      Psychiatric:         Infectious Disease:         Malignancy:         Other:                     Physical Exam  Normal systems: cardiovascular, pulmonary and dental    Airway   Mallampati: I  Neck ROM: full    Dental   (+) chipped    Cardiovascular       Pulmonary                     Anesthesia Plan      History & Physical Review  History and physical reviewed and following examination; no interval  change.    ASA Status:  3 .    NPO Status:  > 8 hours    Plan for MAC Reason for MAC:  Deep or markedly invasive procedure (G8)  PONV prophylaxis:  Ondansetron (or other 5HT-3)       Postoperative Care  Postoperative pain management:  IV analgesics.      Consents  Anesthetic plan, risks, benefits and alternatives discussed with:  Patient..                          .

## 2017-05-30 LAB — COPATH REPORT: NORMAL

## 2017-06-07 ENCOUNTER — DOCUMENTATION ONLY (OUTPATIENT)
Dept: CARE COORDINATION | Facility: CLINIC | Age: 52
End: 2017-06-07

## 2017-06-07 DIAGNOSIS — K59.00 CONSTIPATION, UNSPECIFIED CONSTIPATION TYPE: Primary | ICD-10-CM

## 2017-06-07 RX ORDER — POLYETHYLENE GLYCOL 3350 17 G/17G
1 POWDER, FOR SOLUTION ORAL DAILY PRN
Qty: 510 G | Refills: 1 | Status: SHIPPED | OUTPATIENT
Start: 2017-06-07 | End: 2017-08-18

## 2017-06-07 NOTE — PROGRESS NOTES
Ordered    Willi Aguilar MD  [facility], [place]

## 2017-06-07 NOTE — PROGRESS NOTES
[facility] now requests orders and shares plan of care/discharge summaries for some patients through [employer].  Please REPLY TO THIS MESSAGE in order to give authorization for orders when needed.  This is considered a verbal order, you will still receive a faxed copy of orders for signature.  Thank you for your assistance in improving collaboration for our patients.    ORDER  Would we be able to get an order for Miralax sent to [facility] as patient having symptoms of constipation.

## 2017-06-07 NOTE — PROGRESS NOTES
Prescription of polyethylene glycol was faxed to [facility]  Date:June 7, 2017  Signature:Shamika BARBA

## 2017-06-20 PROCEDURE — G0180 MD CERTIFICATION HHA PATIENT: HCPCS | Performed by: FAMILY MEDICINE

## 2017-06-21 ENCOUNTER — TELEPHONE (OUTPATIENT)
Dept: FAMILY MEDICINE | Facility: CLINIC | Age: 52
End: 2017-06-21

## 2017-06-21 NOTE — TELEPHONE ENCOUNTER
Patient under [facility] Care at this time.    Verbal order given for social work evaluation for transportation.    Will fax for signature.  Roseanna Bangura RN

## 2017-06-26 ENCOUNTER — TELEPHONE (OUTPATIENT)
Dept: ORTHOPEDICS | Facility: CLINIC | Age: 52
End: 2017-06-26

## 2017-06-26 NOTE — TELEPHONE ENCOUNTER
Faxed back a signed plan/updated plan of progress to [facility] Amanda Noel at [phone]. Fax confirmed.  Florina Burrell Clinical Medical Assistant

## 2017-07-14 ENCOUNTER — TRANSFERRED RECORDS (OUTPATIENT)
Dept: HEALTH INFORMATION MANAGEMENT | Facility: CLINIC | Age: 52
End: 2017-07-14

## 2017-07-14 DIAGNOSIS — Z53.9 DIAGNOSIS NOT YET DEFINED: Primary | ICD-10-CM

## 2017-07-14 PROCEDURE — G0180 MD CERTIFICATION HHA PATIENT: HCPCS | Performed by: FAMILY MEDICINE

## 2017-07-19 ENCOUNTER — DOCUMENTATION ONLY (OUTPATIENT)
Dept: CARE COORDINATION | Facility: CLINIC | Age: 52
End: 2017-07-19

## 2017-07-19 NOTE — PROGRESS NOTES
[facility] now requests orders and shares plan of care/discharge summaries for some patients through [facility].  Please REPLY TO THIS MESSAGE in order to give authorization for orders when needed.  This is considered a verbal order, you will still receive a faxed copy of orders for signature.  Thank you for your assistance in improving collaboration for our patients.    ORDER    MD SUMMARY/PLAN OF CARE  Situation Patient alert and oriented x 5.  VSS, lungs CTA.  Patient is still having abdominal pains that aren't relieved by anything at this time. The pain comes and goes however there doesn't seem to be a pattern or a trend. She was seen by GI specialist last week who would like patient to have another /test/ to see whats going on in her abdomen however patient did not know what test this was.  She has a dentist appointment today and believes she cracked a tooth.  Ongoing constipation noted, reports last BM this morning small hard amount. States she even with drinking Miralax daily, it still hasn't helped much.  Current weight 264 lbs.  Right knee pain ongoing as well.  Relieved with repo however wound like a permanent fix for this pain. Encouraged patient to follow up with surgeon who performed knee surgery however she states that all he wants to do is cortisone injections and doesn't want to have to keep doing something like that long term. Patient completed PT in home last week and is supposed to transition to OP therapy for pool therapy. this has not been set up yet.  Reports that through the [facility] she is supposed to be getting PCA services however no one has started yet.  Educated that both HHA and PCA services cannot be billed at the same time to Medicaid and states she would like to see if she can get PCA services started in the next week.  If she can't then she will continue with HHA through home care.  No HHA scheduled at this time.   Background Patient is a 50 y/o female with referral to  home care services after MD referral to address low back and leg pain.  PMH includes Morbid obesity, Major depressive disorder, Dependent edema, Intertrigo, Knee pain, Acute abdominal pain, Sickle cell trait, sleep apnea, GERD, asthma, duodenal ulcer, gastritis.   Analysis  patient requiring assistance with medication management   Recommendation weekly SN and 3 as needed for medication management, pain management, bowel montioring

## 2017-08-04 ENCOUNTER — TELEPHONE (OUTPATIENT)
Dept: FAMILY MEDICINE | Facility: CLINIC | Age: 52
End: 2017-08-04

## 2017-08-04 ENCOUNTER — DOCUMENTATION ONLY (OUTPATIENT)
Dept: CARE COORDINATION | Facility: CLINIC | Age: 52
End: 2017-08-04

## 2017-08-04 ENCOUNTER — HOSPITAL ENCOUNTER (EMERGENCY)
Facility: CLINIC | Age: 52
Discharge: HOME | End: 2017-08-04
Attending: EMERGENCY MEDICINE | Admitting: EMERGENCY MEDICINE
Payer: MEDICARE

## 2017-08-04 ENCOUNTER — APPOINTMENT (OUTPATIENT)
Dept: CT IMAGING | Facility: CLINIC | Age: 52
End: 2017-08-04
Attending: EMERGENCY MEDICINE
Payer: MEDICARE

## 2017-08-04 ENCOUNTER — TRANSFERRED RECORDS (OUTPATIENT)
Dept: HEALTH INFORMATION MANAGEMENT | Facility: CLINIC | Age: 52
End: 2017-08-04

## 2017-08-04 VITALS
SYSTOLIC BLOOD PRESSURE: 147 MMHG | DIASTOLIC BLOOD PRESSURE: 84 MMHG | TEMPERATURE: 98.4 F | OXYGEN SATURATION: 97 % | HEIGHT: 61 IN | WEIGHT: 260 LBS | BODY MASS INDEX: 49.09 KG/M2 | RESPIRATION RATE: 20 BRPM | HEART RATE: 87 BPM

## 2017-08-04 DIAGNOSIS — G44.209 TENSION HEADACHE: ICD-10-CM

## 2017-08-04 DIAGNOSIS — J45.909 UNCOMPLICATED ASTHMA, UNSPECIFIED ASTHMA SEVERITY: ICD-10-CM

## 2017-08-04 DIAGNOSIS — M79.669 PAIN OF LOWER LEG, UNSPECIFIED LATERALITY: Primary | ICD-10-CM

## 2017-08-04 DIAGNOSIS — M54.2 NECK PAIN: ICD-10-CM

## 2017-08-04 DIAGNOSIS — K21.9 GASTROESOPHAGEAL REFLUX DISEASE, ESOPHAGITIS PRESENCE NOT SPECIFIED: ICD-10-CM

## 2017-08-04 LAB
ANION GAP SERPL CALCULATED.3IONS-SCNC: 6 MMOL/L (ref 3–14)
BASOPHILS # BLD AUTO: 0 10E9/L (ref 0–0.2)
BASOPHILS NFR BLD AUTO: 0.1 %
BUN SERPL-MCNC: 13 MG/DL (ref 7–30)
CALCIUM SERPL-MCNC: 9.3 MG/DL (ref 8.5–10.1)
CHLORIDE SERPL-SCNC: 104 MMOL/L (ref 94–109)
CO2 SERPL-SCNC: 28 MMOL/L (ref 20–32)
CREAT SERPL-MCNC: 0.94 MG/DL (ref 0.52–1.04)
DIFFERENTIAL METHOD BLD: NORMAL
EOSINOPHIL # BLD AUTO: 0.1 10E9/L (ref 0–0.7)
EOSINOPHIL NFR BLD AUTO: 1.3 %
ERYTHROCYTE [DISTWIDTH] IN BLOOD BY AUTOMATED COUNT: 12.9 % (ref 10–15)
GFR SERPL CREATININE-BSD FRML MDRD: 62 ML/MIN/1.7M2
GLUCOSE SERPL-MCNC: 91 MG/DL (ref 70–99)
HCT VFR BLD AUTO: 36.5 % (ref 35–47)
HGB BLD-MCNC: 12.6 G/DL (ref 11.7–15.7)
IMM GRANULOCYTES # BLD: 0 10E9/L (ref 0–0.4)
IMM GRANULOCYTES NFR BLD: 0.1 %
LYMPHOCYTES # BLD AUTO: 2.2 10E9/L (ref 0.8–5.3)
LYMPHOCYTES NFR BLD AUTO: 30.9 %
MCH RBC QN AUTO: 29.7 PG (ref 26.5–33)
MCHC RBC AUTO-ENTMCNC: 34.5 G/DL (ref 31.5–36.5)
MCV RBC AUTO: 86 FL (ref 78–100)
MONOCYTES # BLD AUTO: 0.4 10E9/L (ref 0–1.3)
MONOCYTES NFR BLD AUTO: 5.4 %
NEUTROPHILS # BLD AUTO: 4.4 10E9/L (ref 1.6–8.3)
NEUTROPHILS NFR BLD AUTO: 62.2 %
NRBC # BLD AUTO: 0 10*3/UL
NRBC BLD AUTO-RTO: 0 /100
PLATELET # BLD AUTO: 360 10E9/L (ref 150–450)
POTASSIUM SERPL-SCNC: 3.7 MMOL/L (ref 3.4–5.3)
RBC # BLD AUTO: 4.24 10E12/L (ref 3.8–5.2)
SODIUM SERPL-SCNC: 138 MMOL/L (ref 133–144)
WBC # BLD AUTO: 7.1 10E9/L (ref 4–11)

## 2017-08-04 PROCEDURE — 85025 COMPLETE CBC W/AUTO DIFF WBC: CPT | Performed by: EMERGENCY MEDICINE

## 2017-08-04 PROCEDURE — 96374 THER/PROPH/DIAG INJ IV PUSH: CPT

## 2017-08-04 PROCEDURE — 99285 EMERGENCY DEPT VISIT HI MDM: CPT | Mod: 25

## 2017-08-04 PROCEDURE — 70450 CT HEAD/BRAIN W/O DYE: CPT

## 2017-08-04 PROCEDURE — 80048 BASIC METABOLIC PNL TOTAL CA: CPT | Performed by: EMERGENCY MEDICINE

## 2017-08-04 PROCEDURE — 96375 TX/PRO/DX INJ NEW DRUG ADDON: CPT

## 2017-08-04 PROCEDURE — 96376 TX/PRO/DX INJ SAME DRUG ADON: CPT

## 2017-08-04 PROCEDURE — 25000128 H RX IP 250 OP 636: Performed by: EMERGENCY MEDICINE

## 2017-08-04 PROCEDURE — 96361 HYDRATE IV INFUSION ADD-ON: CPT

## 2017-08-04 RX ORDER — HYDROMORPHONE HYDROCHLORIDE 1 MG/ML
0.5 INJECTION, SOLUTION INTRAMUSCULAR; INTRAVENOUS; SUBCUTANEOUS
Status: DISCONTINUED | OUTPATIENT
Start: 2017-08-04 | End: 2017-08-04 | Stop reason: HOSPADM

## 2017-08-04 RX ORDER — HYDROCODONE BITARTRATE AND ACETAMINOPHEN 5; 325 MG/1; MG/1
1 TABLET ORAL EVERY 6 HOURS PRN
Qty: 15 TABLET | Refills: 0 | Status: SHIPPED | OUTPATIENT
Start: 2017-08-04 | End: 2017-08-18

## 2017-08-04 RX ORDER — KETOROLAC TROMETHAMINE 30 MG/ML
30 INJECTION, SOLUTION INTRAMUSCULAR; INTRAVENOUS ONCE
Status: COMPLETED | OUTPATIENT
Start: 2017-08-04 | End: 2017-08-04

## 2017-08-04 RX ORDER — ONDANSETRON 2 MG/ML
4 INJECTION INTRAMUSCULAR; INTRAVENOUS ONCE
Status: COMPLETED | OUTPATIENT
Start: 2017-08-04 | End: 2017-08-04

## 2017-08-04 RX ORDER — LORAZEPAM 2 MG/ML
0.5 INJECTION INTRAMUSCULAR ONCE
Status: COMPLETED | OUTPATIENT
Start: 2017-08-04 | End: 2017-08-04

## 2017-08-04 RX ORDER — PANTOPRAZOLE SODIUM 40 MG/1
40 TABLET, DELAYED RELEASE ORAL DAILY
Qty: 30 TABLET | Refills: 0 | Status: SHIPPED | OUTPATIENT
Start: 2017-08-04 | End: 2017-08-18

## 2017-08-04 RX ORDER — METHOCARBAMOL 750 MG/1
750-1500 TABLET, FILM COATED ORAL 3 TIMES DAILY PRN
Qty: 30 TABLET | Refills: 0 | Status: SHIPPED | OUTPATIENT
Start: 2017-08-04 | End: 2017-08-09

## 2017-08-04 RX ADMIN — HYDROMORPHONE HYDROCHLORIDE 0.5 MG: 1 INJECTION, SOLUTION INTRAMUSCULAR; INTRAVENOUS; SUBCUTANEOUS at 14:38

## 2017-08-04 RX ADMIN — KETOROLAC TROMETHAMINE 30 MG: 30 INJECTION, SOLUTION INTRAMUSCULAR; INTRAVENOUS at 14:34

## 2017-08-04 RX ADMIN — LORAZEPAM 0.5 MG: 2 INJECTION INTRAMUSCULAR; INTRAVENOUS at 14:42

## 2017-08-04 RX ADMIN — SODIUM CHLORIDE 1000 ML: 9 INJECTION, SOLUTION INTRAVENOUS at 14:33

## 2017-08-04 RX ADMIN — ONDANSETRON 4 MG: 2 SOLUTION INTRAMUSCULAR; INTRAVENOUS at 14:30

## 2017-08-04 RX ADMIN — HYDROMORPHONE HYDROCHLORIDE 0.5 MG: 1 INJECTION, SOLUTION INTRAMUSCULAR; INTRAVENOUS; SUBCUTANEOUS at 15:55

## 2017-08-04 ASSESSMENT — ENCOUNTER SYMPTOMS
SORE THROAT: 1
FEVER: 0
MYALGIAS: 1
HEADACHES: 1
FREQUENCY: 0
TROUBLE SWALLOWING: 1
WEAKNESS: 0
NAUSEA: 0
SHORTNESS OF BREATH: 0
NUMBNESS: 0
COUGH: 0
NECK PAIN: 1
DIFFICULTY URINATING: 0
LIGHT-HEADEDNESS: 0
HEMATURIA: 0
DIZZINESS: 0
RHINORRHEA: 0
VOMITING: 0
BACK PAIN: 1
DIARRHEA: 0
ABDOMINAL PAIN: 0
DYSURIA: 0

## 2017-08-04 NOTE — ED AVS SNAPSHOT
Emergency Department    [address] [facility] [address]    Phone:  [phone]    Fax:  [phone]                                       Tish De León   MRN: [id number]    Department:   Emergency Department   Date of Visit:  8/4/2017           Patient Information     Date Of Birth          1965        Your diagnoses for this visit were:     Neck pain     Tension headache     Gastroesophageal reflux disease, esophagitis presence not specified        You were seen by Hayder Carlos MD.      Follow-up Information     Follow up with Willi Aguilar MD.    Specialty:  Family Practice    Why:  As needed, If symptoms worsen    Contact information:    [facility]  [address] [place] [address] DR  [place] [address]  [phone]          Discharge Instructions         General Neck and Back Pain    Both neck and back pain are usually caused by injury to the muscles or ligaments of the spine. Sometimes the disks that separate each bone of the spine may cause pain by pressing on a nearby nerve. Back and neck pain may appear after a sudden twisting or bending force (such as in a car accident), or sometimes after a simple awkward movement. In either case, muscle spasm is often present and adds to the pain.  Acute neck and back pain usually gets better in 1 to 2 weeks. Pain related to disk disease, arthritis in the spinal joints or spinal stenosis (narrowing of the spinal canal) can become chronic and last for months or years.  Back and neck pain are common problems. Most people feel better in 1 or 2 weeks, and most of the rest in 1 to 2 months. Most people can remain active.  People experience and describe pain differently.    Pain can be sharp, stabbing, shooting, aching, cramping, or burning    Movement, standing, bending, lifting, sitting, or walking may worsen the pain    Pain can be localized to one spot or area, or it can be more generalized    Pain can spread or radiate upwards,  downwards, to the front, or go down your arms    Muscle spasm may occur.  Most of the time mechanical problems with the muscles or spine cause the pain. it is usually caused by an injury, whether known or not, to the muscles or ligaments. While illnesses can cause back pain, it is usually not caused by a serious illness. Pain is usually related to physical activity, whether sports, exercise, work, or normal activity. Sometimes it can occur without an identifiable cause. This can happen simply by stretching or moving wrong, without noting pain at the time. Other causes include:    Overexertion, lifting, pushing, pulling incorrectly or too aggressively.    Sudden twisting, bending or stretching from an accident (car or fall), or accidental movement.    Poor posture    Poor conditioning, lack of regular exercise    Spinal disc disease or arthritis    Stress    Pregnancy, or illness like appendicitis, bladder or kidney infection, pelvic infections   Home care    For neck pain: Use a comfortable pillow that supports the head and keeps the spine in a neutral position. The position of the head should not be tilted forward or backward.    When in bed, try to find a position of comfort. A firm mattress is best. Try lying flat on your back with pillows under your knees. You can also try lying on your side with your knees bent up towards your chest and a pillow between your knees.    At first, do not try to stretch out the sore spots. If there is a strain, it is not like the good soreness you get after exercising without an injury. In this case, stretching may make it worse.    Avoid prolonged sitting, long car rides or travel. This puts more stress on the lower back than standing or walking.    During the first 24 to 72 hours after an injury, apply an ice pack to the painful area for 20 minutes and then remove it for 20 minutes over a period of 60 to 90 minutes or several times a day.     You can alternate ice and heat  therapies. Talk with your healthcare provider about the best treatment for your back or neck pain. As a safety precaution, do not use a heating pad at bedtime. Sleeping with a heating pad can lead to skin burns or tissue damage.    Therapeutic massage can help relax the back and neck muscles without stretching them.    Be aware of safe lifting methods and do not lift anything over 15 pounds until all the pain is gone.  Medications  Talk to your healthcare provider before using medicine, especially if you have other medical problems or are taking other medicines.    You may use over-the-counter medicine to control pain, unless another pain medicine was prescribed. If you have chronic conditions like diabetes, liver or kidney disease, stomach ulcers,  gastrointestinal bleeding, or are taking blood thinner medicines.    Be careful if you are given pain medicines, narcotics, or medicine for muscle spasm. They can cause drowsiness, and can affect your coordination, reflexes, and judgment. Do not drive or operate heavy machinery.  Follow-up care  Follow up with your healthcare provider, or as advised. Physical therapy or further tests may be needed.  If X-rays were taken, you will be notified of any new findings that may affect your care.  Call [phone]  Seek emergency medical care if any of the following occur:    Trouble breathing    Confusion    Very drowsy or trouble awakening    Fainting or loss of consciousness    Rapid or very slow heart rate    Loss of bowel or bladder control  When to seek medical advice  Call your healthcare provider right away if any of these occur:    Pain becomes worse or spreads into your arms or legs    Weakness, numbness or pain in one or both arms or legs    Numbness in the groin area    Difficulty walking    Fever of 100.4 F (38 C) or higher, or as directed by your healthcare provider  Date Last Reviewed: 7/1/2016 2000-2017 [employer]. [address] [place] [address], [place]  [address]. All rights reserved. This information is not intended as a substitute for professional medical care. Always follow your healthcare professional's instructions.          Medicines for Acid Reflux  Your healthcare provider has told you that you have acid reflux. This condition causes stomach acid to wash up into your throat. For most people, acid reflux is troubling but not dangerous. But left untreated, acid reflux sometimes damages the esophagus. Medicines can help control acid reflux and limit your risk of future problems.  Medicines for acid reflux  Your healthcare provider may prescribe medicine to help treat your acid reflux. Medicine will be based on your symptoms and any test results. Your provider will explain how to take your medicine. You will also be told about possible side effects.  Reducing stomach acid  Your provider may suggest antacids that you can buy over the counter. Antacids can give fast relief. Or you may be told to take a type of medicine called H2 blockers. These are available over the counter and by prescription (for higher doses).  Blocking stomach acid  In more severe cases, your healthcare provider may suggest stronger medicines such as proton pump inhibitors (PPIs). These keep the stomach from making acid. They are often prescribed for long-term use.  Other medicines  In some cases medicines to reduce or block stomach acid may not work. Then you may be switched to another type of medicine that helps your stomach empty better.     Date Last Reviewed: 10/1/2016    7633-4648 [employer]. [address] [place] [address], [place] [address]. All rights reserved. This information is not intended as a substitute for professional medical care. Always follow your healthcare professional's instructions.          How Acid Reflux Affects Your Throat    Do you have to clear your throat or cough often? Are you hoarse? Do you have trouble swallowing? If you have these or other throat  symptoms, you may have acid reflux. This occurs when stomach acid flows back up and irritates your throat.  Why you have throat symptoms  There are muscles (esophageal sphincters) at both ends of the tube that carries food to your stomach (the esophagus). These muscles relax to let food pass. Then they tighten to keep stomach acid down. When the lower esophageal sphincter (LES) doesn t tighten enough, acid can flow back (reflux) from your stomach into your esophagus. This may cause heartburn. In some cases the upper esophageal sphincter (UES) also doesn t work well. Then acid can travel higher and enter your throat (pharynx). In many cases, this causes throat symptoms.  Common throat symptoms    Need to clear your throat often    Feeling like you re choking    Long-term (chronic) cough    Hoarseness    Trouble swallowing    Feel like you have a lump in your throat    Sour or acid taste    Sore throat that keeps coming back   Date Last Reviewed: 7/1/2016 2000-2017 [employer]. [address] [place] [address], [place] [id number]. All rights reserved. This information is not intended as a substitute for professional medical care. Always follow your healthcare professional's instructions.      Discharge Instructions  Neck Strain    You have been seen today for a neck sprain or strain.  Neck strains usually result from an injury to the neck. Car accidents, contact sports, and falls are common causes of neck strain. Sometimes your neck can start to hurt because of increased activity, muscle tension, an abnormal sleeping position, or because of other problems like arthritis in the neck.     Neck pain usually comes from injured muscles and ligaments. Sometimes there is a herniated ( slipped ) disc. We do not usually do MRI scans to look for these right away, since most herniated discs will get better on their own with time. Today, we did not find any evidence that your neck pain was caused by a serious or dangerous  condition. However, sometimes symptoms develop over time and cannot be found during an emergency visit, so it is very important that you follow up with your primary provider.    Generally, every Emergency Department visit should have a follow-up clinic visit with either a primary or a specialty clinic/provider. Please follow-up as instructed by your emergency provider today.    Return to the Emergency Department if:    You have increasing pain in your neck.    You develop difficulty swallowing or breathing.    You have numbness, weakness, or trouble moving your arms or legs.    You have severe dizziness and difficulty walking.    You are unable to control your bladder or bowels.    You develop severe headache or ringing in the ears.    What can I do to help myself at home?    If you had an injury, use cold for the first 1-2 days. Cold helps relieve pain and reduce inflammation.  Apply ice packs to the neck or areas of pain every 1-2 hours for 20 minutes at a time. Place a towel or cloth between your skin and the ice pack.    After the first 2 days, using heat can help with neck pain and stiffness. You may use a warm shower or bath, warm towels on the neck, or a heating pad. Do not sleep with a heating pad, as you can be burned.     Pain medications - You may take a pain medication such as Tylenol  (acetaminophen), Advil  and Motrin  (ibuprofen), or Aleve  (naproxen).    It is usually best to rest the neck for 1-2 days after an injury, then start gentle stretching exercises.     It is helpful to place a small pillow under the nape of your neck to provide proper neutral positioning.     You should stay active and do your usual work as much as you can, unless this involves heavy physical labor. Ask your provider if you need work restrictions.  If you were given a prescription for medicine here today, be sure to read all of the information (including the package insert) that comes with your prescription.  This will  include important information about the medicine, its side effects, and any warnings that you need to know about.  The pharmacist who fills the prescription can provide more information and answer questions you may have about the medicine.  If you have questions or concerns that the pharmacist cannot address, please call or return to the Emergency Department.   Remember that you can always come back to the Emergency Department if you are not able to see your regular provider in the amount of time listed above, if you get any new symptoms, or if there is anything that worries you.            Future Appointments        Provider Department Dept Phone Center    10/25/2017 2:00 PM Renay Layton MD [facility] [phone] [facility]      24 Hour Appointment Hotline       To make an appointment at any [facility], call [phone] ([phone]). If you don't have a family doctor or clinic, we will help you find one. [facility] are conveniently located to serve the needs of you and your family.             Review of your medicines      START taking        Dose / Directions Last dose taken    methocarbamol 750 MG tablet   Commonly known as:  ROBAXIN   Dose:  750-1500 mg   Quantity:  30 tablet        Take 1-2 tablets (750-1,500 mg) by mouth 3 times daily as needed for muscle spasms   Refills:  0        pantoprazole 40 MG EC tablet   Commonly known as:  PROTONIX   Dose:  40 mg   Quantity:  30 tablet        Take 1 tablet (40 mg) by mouth daily for 30 doses   Refills:  0          Our records show that you are taking the medicines listed below. If these are incorrect, please call your family doctor or clinic.        Dose / Directions Last dose taken    albuterol 108 (90 BASE) MCG/ACT Inhaler   Commonly known as:  PROAIR HFA/PROVENTIL HFA/VENTOLIN HFA   Dose:  2 puff   Quantity:  3 Inhaler        Inhale 2 puffs into the lungs every 6 hours as needed for shortness of breath / dyspnea    Refills:  1        calcium 600 MG tablet   Dose:  600 mg   Quantity:  60 tablet        Take 1 tablet (600 mg) by mouth 2 times daily   Refills:  11        dexlansoprazole 30 MG Cpdr CR capsule   Commonly known as:  DEXILANT   Dose:  30 mg   Quantity:  90 capsule        Take 1 capsule (30 mg) by mouth daily   Refills:  1        diclofenac 75 MG EC tablet   Commonly known as:  VOLTAREN        Refills:  0        fluticasone-salmeterol 250-50 MCG/DOSE diskus inhaler   Commonly known as:  ADVAIR   Dose:  1 puff   Quantity:  1 Inhaler        Inhale 1 puff into the lungs 2 times daily   Refills:  3        furosemide 20 MG tablet   Commonly known as:  LASIX   Dose:  20 mg   Quantity:  30 tablet        Take 1 tablet (20 mg) by mouth daily as needed   Refills:  3        HYDROcodone-acetaminophen 5-325 MG per tablet   Commonly known as:  NORCO        Refills:  0        ibuprofen 800 MG tablet   Commonly known as:  ADVIL/MOTRIN        Refills:  0        ketotifen 0.025 % Soln ophthalmic solution   Commonly known as:  ZADITOR   Dose:  1 drop   Quantity:  1 Bottle        Place 1 drop into both eyes every 12 hours   Refills:  11        montelukast 10 MG tablet   Commonly known as:  SINGULAIR   Dose:  10 mg   Quantity:  90 tablet        Take 1 tablet (10 mg) by mouth At Bedtime   Refills:  3        nystatin 257876 UNIT/GM Powd   Commonly known as:  MYCOSTATIN   Quantity:  30 g        Apply topically 3 times daily as needed   Refills:  3        polyethylene glycol powder   Commonly known as:  MIRALAX   Dose:  1 capful   Quantity:  510 g        Take 17 g (1 capful) by mouth daily as needed for constipation   Refills:  1        potassium chloride 10 MEQ tablet   Commonly known as:  K-TAB,KLOR-CON   Dose:  10 mEq   Quantity:  30 tablet        Take 1 tablet (10 mEq) by mouth daily as needed for potassium supplementation   Refills:  3        sucralfate 1 GM tablet   Commonly known as:  CARAFATE   Quantity:  40 tablet        Take 1  tablet (1 g) by mouth 4 times daily   Refills:  3        vitamin D 2000 UNITS tablet   Quantity:  100 tablet        Take 6000 IU daily for 6 weeks then take 2000 IU daily therafter   Refills:  3                Prescriptions were sent or printed at these locations (2 Prescriptions)                   Other Prescriptions                Printed at Department/Unit printer (2 of 2)         methocarbamol (ROBAXIN) 750 MG tablet               pantoprazole (PROTONIX) 40 MG EC tablet                Procedures and tests performed during your visit     Basic metabolic panel    CBC with platelets differential    Head CT w/o contrast    IV access      Orders Needing Specimen Collection     None      Pending Results     No orders found from 8/2/2017 to 8/5/2017.            Pending Culture Results     No orders found from 8/2/2017 to 8/5/2017.            Pending Results Instructions     If you had any lab results that were not finalized at the time of your Discharge, you can call the ED Lab Result RN at [phone]. You will be contacted by this team for any positive Lab results or changes in treatment. The nurses are available 7 days a week from 10A to 6:30P.  You can leave a message 24 hours per day and they will return your call.        Test Results From Your Hospital Stay        8/4/2017  2:47 PM      Component Results     Component Value Ref Range & Units Status    WBC 7.1 4.0 - 11.0 10e9/L Final    RBC Count 4.24 3.8 - 5.2 10e12/L Final    Hemoglobin 12.6 11.7 - 15.7 g/dL Final    Hematocrit 36.5 35.0 - 47.0 % Final    MCV 86 78 - 100 fl Final    MCH 29.7 26.5 - 33.0 pg Final    MCHC 34.5 31.5 - 36.5 g/dL Final    RDW 12.9 10.0 - 15.0 % Final    Platelet Count 360 150 - 450 10e9/L Final    Diff Method Automated Method  Final    % Neutrophils 62.2 % Final    % Lymphocytes 30.9 % Final    % Monocytes 5.4 % Final    % Eosinophils 1.3 % Final    % Basophils 0.1 % Final    % Immature Granulocytes 0.1 % Final    Nucleated RBCs 0 0  /100 Final    Absolute Neutrophil 4.4 1.6 - 8.3 10e9/L Final    Absolute Lymphocytes 2.2 0.8 - 5.3 10e9/L Final    Absolute Monocytes 0.4 0.0 - 1.3 10e9/L Final    Absolute Eosinophils 0.1 0.0 - 0.7 10e9/L Final    Absolute Basophils 0.0 0.0 - 0.2 10e9/L Final    Abs Immature Granulocytes 0.0 0 - 0.4 10e9/L Final    Absolute Nucleated RBC 0.0  Final         8/4/2017  3:10 PM      Component Results     Component Value Ref Range & Units Status    Sodium 138 133 - 144 mmol/L Final    Potassium 3.7 3.4 - 5.3 mmol/L Final    Chloride 104 94 - 109 mmol/L Final    Carbon Dioxide 28 20 - 32 mmol/L Final    Anion Gap 6 3 - 14 mmol/L Final    Glucose 91 70 - 99 mg/dL Final    Urea Nitrogen 13 7 - 30 mg/dL Final    Creatinine 0.94 0.52 - 1.04 mg/dL Final    GFR Estimate 62 >60 mL/min/1.7m2 Final    Non  GFR Calc    GFR Estimate If Black 75 >60 mL/min/1.7m2 Final    African American GFR Calc    Calcium 9.3 8.5 - 10.1 mg/dL Final         8/4/2017  3:56 PM      Narrative     CT HEAD W/O CONTRAST   8/4/2017 3:46 PM     HISTORY: headache, 3 days, bleed    TECHNIQUE:  Axial images of the head without IV contrast material.  Radiation dose for this scan was reduced using automated exposure  control, adjustment of the mA and/or kV according to patient size, or  iterative reconstruction technique.    COMPARISON: None.    FINDINGS: The ventricles are normal in size, shape and configuration.  A cavum septum lucidum is incidentally noted. The brain parenchyma and  subarachnoid spaces are normal. There is no evidence of intracranial  hemorrhage, mass, acute infarct or anomaly. The visualized portions of  the sinuses and mastoids appear normal. There is no evidence of  trauma.        Impression     IMPRESSION:  No acute pathology, no bleed, mass, or infarcts are seen.    FLORENCE NEVAREZ MD                Clinical Quality Measure: Blood Pressure Screening     Your blood pressure was checked while you were in the emergency  "department today. The last reading we obtained was  BP: 129/77 . Please read the guidelines below about what these numbers mean and what you should do about them.  If your systolic blood pressure (the top number) is less than 120 and your diastolic blood pressure (the bottom number) is less than 80, then your blood pressure is normal. There is nothing more that you need to do about it.  If your systolic blood pressure (the top number) is 120-139 or your diastolic blood pressure (the bottom number) is 80-89, your blood pressure may be higher than it should be. You should have your blood pressure rechecked within a year by a primary care provider.  If your systolic blood pressure (the top number) is 140 or greater or your diastolic blood pressure (the bottom number) is 90 or greater, you may have high blood pressure. High blood pressure is treatable, but if left untreated over time it can put you at risk for heart attack, stroke, or kidney failure. You should have your blood pressure rechecked by a primary care provider within the next 4 weeks.  If your provider in the emergency department today gave you specific instructions to follow-up with your doctor or provider even sooner than that, you should follow that instruction and not wait for up to 4 weeks for your follow-up visit.        Thank you for choosing [facility]       Thank you for choosing [facility] for your care. Our goal is always to provide you with excellent care. Hearing back from our patients is one way we can continue to improve our services. Please take a few minutes to complete the written survey that you may receive in the mail after you visit with us. Thank you!        [employer] Information     [employer] lets you send messages to your doctor, view your test results, renew your prescriptions, schedule appointments and more. To sign up, go to [email or web address] . Click on \"Log in\" on the left side of the screen, which will take you to the Welcome " "page. Then click on \"Sign up Now\" on the right side of the page.     You will be asked to enter the access code listed below, as well as some personal information. Please follow the directions to create your username and password.     Your access code is: [email or web address]  Expires: 2017  8:37 AM     Your access code will  in 90 days. If you need help or a new code, please call your [facility] clinic or [phone].        Care EveryWhere ID     This is your Care EveryWhere ID. This could be used by other organizations to access your [facility] medical records  [id number]        Equal Access to Services     KAT CATALAN : Pedro Álvarez, mitali yan, benny rizo, christoph george. So [facility] [phone].    ATENCIÓN: Si habla español, tiene a chappell disposición servicios gratuitos de asistencia lingüística. Llame al [phone].    We comply with applicable federal civil rights laws and [place] laws. We do not discriminate on the basis of race, color, national origin, age, disability sex, sexual orientation or gender identity.            After Visit Summary       This is your record. Keep this with you and show to your community pharmacist(s) and doctor(s) at your next visit.                  "

## 2017-08-04 NOTE — DISCHARGE INSTRUCTIONS
General Neck and Back Pain    Both neck and back pain are usually caused by injury to the muscles or ligaments of the spine. Sometimes the disks that separate each bone of the spine may cause pain by pressing on a nearby nerve. Back and neck pain may appear after a sudden twisting or bending force (such as in a car accident), or sometimes after a simple awkward movement. In either case, muscle spasm is often present and adds to the pain.  Acute neck and back pain usually gets better in 1 to 2 weeks. Pain related to disk disease, arthritis in the spinal joints or spinal stenosis (narrowing of the spinal canal) can become chronic and last for months or years.  Back and neck pain are common problems. Most people feel better in 1 or 2 weeks, and most of the rest in 1 to 2 months. Most people can remain active.  People experience and describe pain differently.    Pain can be sharp, stabbing, shooting, aching, cramping, or burning    Movement, standing, bending, lifting, sitting, or walking may worsen the pain    Pain can be localized to one spot or area, or it can be more generalized    Pain can spread or radiate upwards, downwards, to the front, or go down your arms    Muscle spasm may occur.  Most of the time mechanical problems with the muscles or spine cause the pain. it is usually caused by an injury, whether known or not, to the muscles or ligaments. While illnesses can cause back pain, it is usually not caused by a serious illness. Pain is usually related to physical activity, whether sports, exercise, work, or normal activity. Sometimes it can occur without an identifiable cause. This can happen simply by stretching or moving wrong, without noting pain at the time. Other causes include:    Overexertion, lifting, pushing, pulling incorrectly or too aggressively.    Sudden twisting, bending or stretching from an accident (car or fall), or accidental movement.    Poor posture    Poor conditioning, lack of regular  exercise    Spinal disc disease or arthritis    Stress    Pregnancy, or illness like appendicitis, bladder or kidney infection, pelvic infections   Home care    For neck pain: Use a comfortable pillow that supports the head and keeps the spine in a neutral position. The position of the head should not be tilted forward or backward.    When in bed, try to find a position of comfort. A firm mattress is best. Try lying flat on your back with pillows under your knees. You can also try lying on your side with your knees bent up towards your chest and a pillow between your knees.    At first, do not try to stretch out the sore spots. If there is a strain, it is not like the good soreness you get after exercising without an injury. In this case, stretching may make it worse.    Avoid prolonged sitting, long car rides or travel. This puts more stress on the lower back than standing or walking.    During the first 24 to 72 hours after an injury, apply an ice pack to the painful area for 20 minutes and then remove it for 20 minutes over a period of 60 to 90 minutes or several times a day.     You can alternate ice and heat therapies. Talk with your healthcare provider about the best treatment for your back or neck pain. As a safety precaution, do not use a heating pad at bedtime. Sleeping with a heating pad can lead to skin burns or tissue damage.    Therapeutic massage can help relax the back and neck muscles without stretching them.    Be aware of safe lifting methods and do not lift anything over 15 pounds until all the pain is gone.  Medications  Talk to your healthcare provider before using medicine, especially if you have other medical problems or are taking other medicines.    You may use over-the-counter medicine to control pain, unless another pain medicine was prescribed. If you have chronic conditions like diabetes, liver or kidney disease, stomach ulcers,  gastrointestinal bleeding, or are taking blood thinner  medicines.    Be careful if you are given pain medicines, narcotics, or medicine for muscle spasm. They can cause drowsiness, and can affect your coordination, reflexes, and judgment. Do not drive or operate heavy machinery.  Follow-up care  Follow up with your healthcare provider, or as advised. Physical therapy or further tests may be needed.  If X-rays were taken, you will be notified of any new findings that may affect your care.  Call 911  Seek emergency medical care if any of the following occur:    Trouble breathing    Confusion    Very drowsy or trouble awakening    Fainting or loss of consciousness    Rapid or very slow heart rate    Loss of bowel or bladder control  When to seek medical advice  Call your healthcare provider right away if any of these occur:    Pain becomes worse or spreads into your arms or legs    Weakness, numbness or pain in one or both arms or legs    Numbness in the groin area    Difficulty walking    Fever of 100.4 F (38 C) or higher, or as directed by your healthcare provider  Date Last Reviewed: 7/1/2016 2000-2017 [employer]. [address] [place] [address]. All rights reserved. This information is not intended as a substitute for professional medical care. Always follow your healthcare professional's instructions.          Medicines for Acid Reflux  Your healthcare provider has told you that you have acid reflux. This condition causes stomach acid to wash up into your throat. For most people, acid reflux is troubling but not dangerous. But left untreated, acid reflux sometimes damages the esophagus. Medicines can help control acid reflux and limit your risk of future problems.  Medicines for acid reflux  Your healthcare provider may prescribe medicine to help treat your acid reflux. Medicine will be based on your symptoms and any test results. Your provider will explain how to take your medicine. You will also be told about possible side  effects.  Reducing stomach acid  Your provider may suggest antacids that you can buy over the counter. Antacids can give fast relief. Or you may be told to take a type of medicine called H2 blockers. These are available over the counter and by prescription (for higher doses).  Blocking stomach acid  In more severe cases, your healthcare provider may suggest stronger medicines such as proton pump inhibitors (PPIs). These keep the stomach from making acid. They are often prescribed for long-term use.  Other medicines  In some cases medicines to reduce or block stomach acid may not work. Then you may be switched to another type of medicine that helps your stomach empty better.     Date Last Reviewed: 10/1/2016    1775-9379 [employer]. [address] [place] [address], [place] [address]. All rights reserved. This information is not intended as a substitute for professional medical care. Always follow your healthcare professional's instructions.          How Acid Reflux Affects Your Throat    Do you have to clear your throat or cough often? Are you hoarse? Do you have trouble swallowing? If you have these or other throat symptoms, you may have acid reflux. This occurs when stomach acid flows back up and irritates your throat.  Why you have throat symptoms  There are muscles (esophageal sphincters) at both ends of the tube that carries food to your stomach (the esophagus). These muscles relax to let food pass. Then they tighten to keep stomach acid down. When the lower esophageal sphincter (LES) doesn t tighten enough, acid can flow back (reflux) from your stomach into your esophagus. This may cause heartburn. In some cases the upper esophageal sphincter (UES) also doesn t work well. Then acid can travel higher and enter your throat (pharynx). In many cases, this causes throat symptoms.  Common throat symptoms    Need to clear your throat often    Feeling like you re choking    Long-term (chronic)  cough    Hoarseness    Trouble swallowing    Feel like you have a lump in your throat    Sour or acid taste    Sore throat that keeps coming back   Date Last Reviewed: 7/1/2016 2000-2017 [employer]. [address] [place] [address], [place] [address]. All rights reserved. This information is not intended as a substitute for professional medical care. Always follow your healthcare professional's instructions.      Discharge Instructions  Neck Strain    You have been seen today for a neck sprain or strain.  Neck strains usually result from an injury to the neck. Car accidents, contact sports, and falls are common causes of neck strain. Sometimes your neck can start to hurt because of increased activity, muscle tension, an abnormal sleeping position, or because of other problems like arthritis in the neck.     Neck pain usually comes from injured muscles and ligaments. Sometimes there is a herniated ( slipped ) disc. We do not usually do MRI scans to look for these right away, since most herniated discs will get better on their own with time. Today, we did not find any evidence that your neck pain was caused by a serious or dangerous condition. However, sometimes symptoms develop over time and cannot be found during an emergency visit, so it is very important that you follow up with your primary provider.    Generally, every Emergency Department visit should have a follow-up clinic visit with either a primary or a specialty clinic/provider. Please follow-up as instructed by your emergency provider today.    Return to the Emergency Department if:    You have increasing pain in your neck.    You develop difficulty swallowing or breathing.    You have numbness, weakness, or trouble moving your arms or legs.    You have severe dizziness and difficulty walking.    You are unable to control your bladder or bowels.    You develop severe headache or ringing in the ears.    What can I do to help myself at home?    If  you had an injury, use cold for the first 1-2 days. Cold helps relieve pain and reduce inflammation.  Apply ice packs to the neck or areas of pain every 1-2 hours for 20 minutes at a time. Place a towel or cloth between your skin and the ice pack.    After the first 2 days, using heat can help with neck pain and stiffness. You may use a warm shower or bath, warm towels on the neck, or a heating pad. Do not sleep with a heating pad, as you can be burned.     Pain medications - You may take a pain medication such as Tylenol  (acetaminophen), Advil  and Motrin  (ibuprofen), or Aleve  (naproxen).    It is usually best to rest the neck for 1-2 days after an injury, then start gentle stretching exercises.     It is helpful to place a small pillow under the nape of your neck to provide proper neutral positioning.     You should stay active and do your usual work as much as you can, unless this involves heavy physical labor. Ask your provider if you need work restrictions.  If you were given a prescription for medicine here today, be sure to read all of the information (including the package insert) that comes with your prescription.  This will include important information about the medicine, its side effects, and any warnings that you need to know about.  The pharmacist who fills the prescription can provide more information and answer questions you may have about the medicine.  If you have questions or concerns that the pharmacist cannot address, please call or return to the Emergency Department.   Remember that you can always come back to the Emergency Department if you are not able to see your regular provider in the amount of time listed above, if you get any new symptoms, or if there is anything that worries you.

## 2017-08-04 NOTE — ED PROVIDER NOTES
"  History     Chief Complaint:  Headache    HPI   Tish De León is a 52 year old female who presents to the ED for headache and back pain. The patient reports that she was at water therapy 3 days ago and later that night she started feeling achy coupled with a headache. She kept feeling mucous coming up in her throat along with difficulty swallowing. Today, she is having a burning sensation in her chest along with neck pain, headache, and low back pain. She describes her head as feeling \"swollen on the inside like her brain is too big\" and her neck as feeling weak. Her back pain is making it increasingly difficult to walk. She denies any shortness of breath, fever, cold, dizziness, lightheadedness, numbness, weakness, abdominal pain, urinary problems, nausea, vomiting, diarrhea, or any other symptoms.    Allergies:  Lactose     Medications:    Miralax  Norco  Ibuprofen  Voltaren  Dexilant  Carafate  Mycostatin  Calcium carbonate  Lasix  Potassium Chloride  Singulair  Vitamin D  Advair  Albuterol  Zaditor 0.035% solution    Past Medical History:    Duodenal ulcer without hemorrhage or perforation and without obstruction  Primary osteoarthritis of right knee  Abnormal glucose  Acute abdominal pain  Anxiety  Arthritis  Asthma  Cardiomyopathy  CHF  Degenerative thoracic disc disease  Dependent edema  Depression  GERD  Hyperlipidemia  Obesity  Sickle cell trait  Sleep apnea  Ulcer  ZOE  Vitamin D deficiency  Binge eating disorder  Morbid obesity    Past Surgical History:      Cholecystectomy  Nasal surgery  Knee surgery    Family History:    Bipolar disorder  Hypertension  Depression  Anxiety  Thyroid disease    Social History:  Smoking status: Never  Alcohol use: No  Marital Status: Single      Review of Systems   Constitutional: Negative for fever.   HENT: Positive for sore throat and trouble swallowing. Negative for congestion and rhinorrhea.    Respiratory: Negative for cough and shortness of breath.  " "  Cardiovascular: Positive for chest pain (\"burning\"). Negative for leg swelling.   Gastrointestinal: Negative for abdominal pain, diarrhea, nausea and vomiting.   Genitourinary: Negative for decreased urine volume, difficulty urinating, dysuria, frequency, hematuria and urgency.   Musculoskeletal: Positive for back pain, myalgias (body aches) and neck pain.   Neurological: Positive for headaches. Negative for dizziness, syncope, weakness, light-headedness and numbness.   All other systems reviewed and are negative.    Physical Exam   Patient Vitals for the past 24 hrs:   BP Temp Temp src Pulse Heart Rate Resp SpO2 Height Weight   08/04/17 1610 147/84 - - - - - - - -   08/04/17 1530 129/77 - - - - - 97 % - -   08/04/17 1515 - - - - - - 100 % - -   08/04/17 1500 136/83 - - - - - - - -   08/04/17 1445 - - - - - - 92 % - -   08/04/17 1315 - - - - - - 99 % - -   08/04/17 1300 160/83 - - - - - 96 % - -   08/04/17 1255 - - - - - - 98 % - -   08/04/17 1244 151/84 98.4  F (36.9  C) Oral 87 87 20 100 % 1.549 m (5' 1\") 117.9 kg (260 lb)     Physical Exam  General: Resting comfortably on the gurney  Head:  The scalp, face, and head appear normal  Eyes:  The pupils are equal, round, and reactive to light    There is no nystagmus    Extraocular muscles are intact    Conjunctivae and sclerae are normal  ENT:    The nose is normal    Pinnae are normal    The oropharynx is normal    Uvula is in the midline  Neck:  Normal range of motion    Left paracervical muscular tenderness    There is no mass noted to the anterior neck    There is no rigidity noted    Trachea is in the midline    No mass is detected  CV:  Regular rate and underlying rhythm     Normal S1/S2, no S3/S4    No pathological murmur detected  Resp:  Lungs are clear    There is no tachypnea    Non-labored    No rales    No wheezing   GI:  Abdomen is soft, there is no rigidity    No distension    No tympani    No rebound tenderness     Non-surgical without peritoneal " features  MS:  Normal muscular tone    Symmetric motor strength    No major joint effusions    No asymmetric leg swelling, no calf tenderness    Back: There is left paravertebral muscular pain  Skin:  No rash or acute skin lesions noted  Neuro: Speech is normal and fluent  Psych:  Awake. Alert.      Normal affect.  Appropriate interactions.  Lymph: No anterior cervical lymphadenopathy noted.    Emergency Department Course     Imaging:  Radiographic findings were communicated with the patient who voiced understanding of the findings.    CT-scan Head w/o contrast:  IMPRESSION:  No acute pathology, no bleed, mass, or infarcts are seen.  Result per radiology.     Laboratory:  CBC: WNL (WBC 7.1, HGB 12.6, )   BMP: WNL (Creatinine 0.94)    Interventions:  1430 - Zofran 4 mg IV  1433 - NS 1L IV Bolus  1434 - Toradol 30 mg IV  1442 - Ativan 0.5 mg IV  1555 - Dilaudid 0.5 mg IV    Emergency Department Course:  Past medical records, nursing notes, and vitals reviewed.  1311: I performed an exam of the patient and obtained history, as documented above.    IV inserted and blood drawn. The patient was placed on continuous cardiac monitoring and pulse oximetry.    The patient was sent for a CT while in the emergency department, findings above.    1558: I rechecked the patient. Findings and plan explained to the Patient. Patient discharged home with instructions regarding supportive care, medications, and reasons to return. The importance of close follow-up was reviewed.     Impression & Plan      Medical Decision Making:  Tish De León is a 52 year old female who presents with head pressure, neck discomfort, and reflux symptoms with burning in the chest and up into the neck. 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 CT scan of the brain showed no acute process. I do not believe that based  on the time course and symptoms that CTA is needed at this time. The patient's reflux symptoms are fairly classic and so she will be started on a proton pump inhibitor. Her CT scan of the brain and her blood work is normal. She will be given a muscle relaxer and a very short course of 15 Vicodin tablets as needed for breakthrough pain and she is to continue Ibuprofen. She will be started on Protonix at this time.    Diagnosis:    ICD-10-CM   1. Neck pain M54.2   2. Tension headache G44.209   3. Gastroesophageal reflux disease, esophagitis presence not specified K21.9       Disposition:  Medications as directed. Follow up with primary care.    Discharge Medications:      Details   methocarbamol (ROBAXIN) 750 MG tablet Take 1-2 tablets (750-1,500 mg) by mouth 3 times daily as needed for muscle spasms, Disp-30 tablet, R-0, Local Print      pantoprazole (PROTONIX) 40 MG EC tablet Take 1 tablet (40 mg) by mouth daily for 30 doses, Disp-30 tablet, R-0, Local Print             Talia Correia  8/4/2017    EMERGENCY DEPARTMENT  I, Talia Correia, am serving as a scribe at 1:11 PM on 8/4/2017 to document services personally performed by Hayder Carlos MD based on my observations and the provider's statements to me.        Hayder Carlos MD  08/04/17 5950

## 2017-08-04 NOTE — TELEPHONE ENCOUNTER
[facility] contracts with [facility] for the [facility] . Samra is requesting a written order for skilled nursing for med set up indefinitely.     Please fax written order to [phone], attn Samra.    Informed Samra that the patient went to the ER today.  She requested that order anyway until we know if the patient will be admitted or not.  Roseanna Bangura RN

## 2017-08-04 NOTE — PROGRESS NOTES
Would you call and ask for further details about her pain and fatigue?  Thanks.    Iza Elliott MD

## 2017-08-04 NOTE — PROGRESS NOTES
Spoke with patient, states her neck hurts so bad she can hardly move and pain goes all the way down her spine. States pain has been present for 2 days, she does not feel this is related to an acute illness. Patient sounded groggy and had somewhat slurred speech and was difficult to understand. Nurse advised patient to be evaluated now in ED. Patient is agreeable to plan. Routing back to home care RN as FREDDY.     Stephanie Asif RN   [facility] - Triage

## 2017-08-04 NOTE — PROGRESS NOTES
[facility] now requests orders and shares plan of care/discharge summaries for some patients through [employer].  Please REPLY TO THIS MESSAGE in order to give authorization for orders when needed.  This is considered a verbal order, you will still receive a faxed copy of orders for signature.  Thank you for your assistance in improving collaboration for our patients.    Patient is reporting increase in back and neck pain with increase in fatigue and weakness over the last 2 days.  Unclear what may be causing this. Would you recommend she be seen by a provider?

## 2017-08-04 NOTE — ED AVS SNAPSHOT
Emergency Department    [address] [facility] [address]    Phone:  [phone]    Fax:  [phone]                                       Tish De León   MRN: [id number]    Department:   Emergency Department   Date of Visit:  8/4/2017           After Visit Summary Signature Page     I have received my discharge instructions, and my questions have been answered. I have discussed any challenges I see with this plan with the nurse or doctor.    ..........................................................................................................................................  Patient/Patient Representative Signature      ..........................................................................................................................................  Patient Representative Print Name and Relationship to Patient    ..................................................               ................................................  Date                                            Time    ..........................................................................................................................................  Reviewed by Signature/Title    ...................................................              ..............................................  Date                                                            Time

## 2017-08-10 PROCEDURE — G0179 MD RECERTIFICATION HHA PT: HCPCS | Performed by: INTERNAL MEDICINE

## 2017-08-15 ENCOUNTER — APPOINTMENT (OUTPATIENT)
Dept: CT IMAGING | Facility: CLINIC | Age: 52
End: 2017-08-15
Attending: EMERGENCY MEDICINE
Payer: MEDICARE

## 2017-08-15 ENCOUNTER — HOSPITAL ENCOUNTER (EMERGENCY)
Facility: CLINIC | Age: 52
Discharge: HOME | End: 2017-08-15
Attending: EMERGENCY MEDICINE | Admitting: EMERGENCY MEDICINE
Payer: MEDICARE

## 2017-08-15 ENCOUNTER — APPOINTMENT (OUTPATIENT)
Dept: GENERAL RADIOLOGY | Facility: CLINIC | Age: 52
End: 2017-08-15
Attending: EMERGENCY MEDICINE
Payer: MEDICARE

## 2017-08-15 VITALS
DIASTOLIC BLOOD PRESSURE: 82 MMHG | TEMPERATURE: 97.7 F | OXYGEN SATURATION: 98 % | RESPIRATION RATE: 18 BRPM | HEIGHT: 61 IN | SYSTOLIC BLOOD PRESSURE: 129 MMHG | HEART RATE: 80 BPM | BODY MASS INDEX: 50.41 KG/M2 | WEIGHT: 267 LBS

## 2017-08-15 DIAGNOSIS — M62.81 GENERALIZED MUSCLE WEAKNESS: Primary | ICD-10-CM

## 2017-08-15 LAB
ALBUMIN SERPL-MCNC: 3.3 G/DL (ref 3.4–5)
ALBUMIN UR-MCNC: NEGATIVE MG/DL
ALP SERPL-CCNC: 109 U/L (ref 40–150)
ALT SERPL W P-5'-P-CCNC: 34 U/L (ref 0–50)
ANION GAP SERPL CALCULATED.3IONS-SCNC: 8 MMOL/L (ref 3–14)
APPEARANCE UR: ABNORMAL
AST SERPL W P-5'-P-CCNC: 13 U/L (ref 0–45)
BACTERIA #/AREA URNS HPF: ABNORMAL /HPF
BASOPHILS # BLD AUTO: 0 10E9/L (ref 0–0.2)
BASOPHILS NFR BLD AUTO: 0.2 %
BILIRUB SERPL-MCNC: 0.2 MG/DL (ref 0.2–1.3)
BILIRUB UR QL STRIP: NEGATIVE
BUN SERPL-MCNC: 10 MG/DL (ref 7–30)
CALCIUM SERPL-MCNC: 8.8 MG/DL (ref 8.5–10.1)
CHLORIDE SERPL-SCNC: 105 MMOL/L (ref 94–109)
CO2 SERPL-SCNC: 25 MMOL/L (ref 20–32)
COLOR UR AUTO: ABNORMAL
CREAT SERPL-MCNC: 0.74 MG/DL (ref 0.52–1.04)
D DIMER PPP FEU-MCNC: 0.4 UG/ML FEU (ref 0–0.5)
DIFFERENTIAL METHOD BLD: NORMAL
EOSINOPHIL # BLD AUTO: 0 10E9/L (ref 0–0.7)
EOSINOPHIL NFR BLD AUTO: 0.6 %
ERYTHROCYTE [DISTWIDTH] IN BLOOD BY AUTOMATED COUNT: 12.8 % (ref 10–15)
GFR SERPL CREATININE-BSD FRML MDRD: 82 ML/MIN/1.7M2
GLUCOSE BLDC GLUCOMTR-MCNC: 151 MG/DL (ref 70–99)
GLUCOSE SERPL-MCNC: 123 MG/DL (ref 70–99)
GLUCOSE UR STRIP-MCNC: NEGATIVE MG/DL
HCG UR QL: NEGATIVE
HCT VFR BLD AUTO: 38 % (ref 35–47)
HGB BLD-MCNC: 13.2 G/DL (ref 11.7–15.7)
HGB UR QL STRIP: NEGATIVE
HYALINE CASTS #/AREA URNS LPF: 2 /LPF (ref 0–2)
IMM GRANULOCYTES # BLD: 0 10E9/L (ref 0–0.4)
IMM GRANULOCYTES NFR BLD: 0.3 %
INTERPRETATION ECG - MUSE: NORMAL
KETONES UR STRIP-MCNC: NEGATIVE MG/DL
LEUKOCYTE ESTERASE UR QL STRIP: NEGATIVE
LYMPHOCYTES # BLD AUTO: 1.4 10E9/L (ref 0.8–5.3)
LYMPHOCYTES NFR BLD AUTO: 21.3 %
MCH RBC QN AUTO: 29.7 PG (ref 26.5–33)
MCHC RBC AUTO-ENTMCNC: 34.7 G/DL (ref 31.5–36.5)
MCV RBC AUTO: 85 FL (ref 78–100)
MONOCYTES # BLD AUTO: 0.4 10E9/L (ref 0–1.3)
MONOCYTES NFR BLD AUTO: 6.3 %
MUCOUS THREADS #/AREA URNS LPF: PRESENT /LPF
NEUTROPHILS # BLD AUTO: 4.6 10E9/L (ref 1.6–8.3)
NEUTROPHILS NFR BLD AUTO: 71.3 %
NITRATE UR QL: NEGATIVE
NRBC # BLD AUTO: 0 10*3/UL
NRBC BLD AUTO-RTO: 0 /100
PH UR STRIP: 5.5 PH (ref 5–7)
PLATELET # BLD AUTO: 306 10E9/L (ref 150–450)
POTASSIUM SERPL-SCNC: 3.9 MMOL/L (ref 3.4–5.3)
PROT SERPL-MCNC: 8 G/DL (ref 6.8–8.8)
RBC # BLD AUTO: 4.45 10E12/L (ref 3.8–5.2)
RBC #/AREA URNS AUTO: <1 /HPF (ref 0–2)
SODIUM SERPL-SCNC: 138 MMOL/L (ref 133–144)
SOURCE: ABNORMAL
SP GR UR STRIP: 1.01 (ref 1–1.03)
SQUAMOUS #/AREA URNS AUTO: 14 /HPF (ref 0–1)
TROPONIN I SERPL-MCNC: <0.015 UG/L (ref 0–0.04)
TSH SERPL DL<=0.005 MIU/L-ACNC: 0.98 MU/L (ref 0.4–4)
UROBILINOGEN UR STRIP-MCNC: NORMAL MG/DL (ref 0–2)
WBC # BLD AUTO: 6.4 10E9/L (ref 4–11)
WBC #/AREA URNS AUTO: 2 /HPF (ref 0–2)

## 2017-08-15 PROCEDURE — 99285 EMERGENCY DEPT VISIT HI MDM: CPT | Mod: 25

## 2017-08-15 PROCEDURE — 93005 ELECTROCARDIOGRAM TRACING: CPT

## 2017-08-15 PROCEDURE — 81025 URINE PREGNANCY TEST: CPT | Performed by: EMERGENCY MEDICINE

## 2017-08-15 PROCEDURE — 81001 URINALYSIS AUTO W/SCOPE: CPT | Performed by: EMERGENCY MEDICINE

## 2017-08-15 PROCEDURE — 84484 ASSAY OF TROPONIN QUANT: CPT | Performed by: EMERGENCY MEDICINE

## 2017-08-15 PROCEDURE — 84443 ASSAY THYROID STIM HORMONE: CPT | Performed by: EMERGENCY MEDICINE

## 2017-08-15 PROCEDURE — 36415 COLL VENOUS BLD VENIPUNCTURE: CPT | Performed by: EMERGENCY MEDICINE

## 2017-08-15 PROCEDURE — 80053 COMPREHEN METABOLIC PANEL: CPT | Performed by: EMERGENCY MEDICINE

## 2017-08-15 PROCEDURE — 85379 FIBRIN DEGRADATION QUANT: CPT | Performed by: EMERGENCY MEDICINE

## 2017-08-15 PROCEDURE — 85025 COMPLETE CBC W/AUTO DIFF WBC: CPT | Performed by: EMERGENCY MEDICINE

## 2017-08-15 PROCEDURE — 00000146 ZZHCL STATISTIC GLUCOSE BY METER IP

## 2017-08-15 PROCEDURE — 71020 XR CHEST 2 VW: CPT

## 2017-08-15 PROCEDURE — 73560 X-RAY EXAM OF KNEE 1 OR 2: CPT | Mod: RT

## 2017-08-15 PROCEDURE — 70450 CT HEAD/BRAIN W/O DYE: CPT

## 2017-08-15 ASSESSMENT — ENCOUNTER SYMPTOMS
ABDOMINAL PAIN: 1
CONSTIPATION: 0
SHORTNESS OF BREATH: 0
DIARRHEA: 0
VOMITING: 0
ARTHRALGIAS: 1
NAUSEA: 0
FATIGUE: 1
HEADACHES: 0
WEAKNESS: 1
FEVER: 0

## 2017-08-15 NOTE — DISCHARGE INSTRUCTIONS
Weakness (Uncertain Cause)  Based on your exam today, the exact cause of your weakness is not certain. However, your weakness does not seem to be a sign of a serious illness at this time. Keep an eye on your symptoms and get medical advice as instructed below.  Home care    Rest at home today. Do not over-exert yourself.    Take any medicine as prescribed.    For the next few days, drink extra fluids (unless your healthcare provider wants you to restrict fluids for other reasons). Do not skip meals.  Follow-up care  Follow up with your healthcare provider or as advised.  When to seek medical advice  Call your healthcare provider for any of the following    Worsening of your symptoms    Symptoms don't start getting better within 2 days    Fever of 100.4  F (38  C) or higher, or as directed by your healthcare provider     Call 911  Get emergency medical care for any of these:    Chest, arm, neck, jaw or upper back pain    Trouble breathing    Numbness or weakness of the face, one arm or one leg    Slurred speech, confusion, trouble speaking, walking or seeing    Blood in vomit or stool (black or red color)    Loss of consciousness  Date Last Reviewed: 6/10/2015    [id number] [employer]. [address] [place] [address], [place] [address]. All rights reserved. This information is not intended as a substitute for professional medical care. Always follow your healthcare professional's instructions.

## 2017-08-15 NOTE — ED PROVIDER NOTES
History     Chief Complaint:  Fall     HPI   Tish De León is a 52 year old female who presents accompanied by her family for evaluation following a fall. Last night, the patient developed abdominal pain that she attributes to her ulcers, and she was unable to sleep due to the severity of her pain. This morning around 0500 the patient did take a tablet of Ambien to attempt to help her sleep. Throughout the morning, the patient has been feeling abnormally fatigued and generally weak, which she believes was due to taking the Ambien. While going down a short flight of stairs into her garage this morning, the patient started to feel increasingly weak and fell down several steps striking the right side of her forehead on the concrete ground and injuring her right knee. She reports that she did not lose consciousness in the fall but also denies full recollection of the incident. This fall was witnessed by the patient's family and they do not report her appearing to lose consciousness in the fall. Following the fall, the patient's son and daughter brought her into the ED for evaluation. Currently in the ED, the patient rates her pain at a severity of 7/10. She feels that she does have some weakness in her right knee, which she feels is due to her pain. Otherwise, she has not had any fever, chest pain, shortness of breath, nausea, vomiting, diarrhea, constipation, or headache in association with her recent symptoms.  No longer complaining of abdominal pain on arrival to ED.    Allergies:  Lactose      Medications:    pantoprazole (PROTONIX) 40 MG EC tablet  HYDROcodone-acetaminophen (NORCO) 5-325 MG per tablet  polyethylene glycol (MIRALAX) powder  ibuprofen (ADVIL/MOTRIN) 800 MG tablet  diclofenac (VOLTAREN) 75 MG EC tablet  dexlansoprazole (DEXILANT) 30 MG CPDR CR capsule  sucralfate (CARAFATE) 1 GM tablet  nystatin (MYCOSTATIN) 094948 UNIT/GM POWD  calcium carbonate 600 MG tablet  furosemide (LASIX) 20 MG  "tablet  potassium chloride (K-TAB,KLOR-CON) 10 MEQ tablet  montelukast (SINGULAIR) 10 MG tablet  Cholecalciferol (VITAMIN D) 2000 UNITS tablet  fluticasone-salmeterol (ADVAIR) 250-50 MCG/DOSE diskus inhaler  albuterol (PROAIR HFA/PROVENTIL HFA/VENTOLIN HFA) 108 (90 BASE) MCG/ACT Inhaler  ketotifen (ZADITOR) 0.025 % SOLN    Past Medical History:    Abnormal glucose  Anxiety  Arthritis  Asthma  Cardiomyopathy, peripartum, postpartum  CHF   Degenerative disc disease, thoracic  Dependent edema  Depression  GERD  Hyperlipidemia  Obesity   Sickle cell trait   Sleep apnea   Ulcer   Obstructive sleep apnea     Past Surgical History:     section  Cholecystectomy  EGD, combined   Knee surgery  Nasal surgery     Family History:    Bipolar disorder - Son  Anxiety - Sister   Depression - Sister  Hypertension - Mother     Social History:  Tobacco use:    Never smoker  Alcohol use:    Negative  Marital status:    Single   Accompanied to ED by:  Daughter and son     Review of Systems   Constitutional: Positive for fatigue. Negative for fever.   Respiratory: Negative for shortness of breath.    Cardiovascular: Negative for chest pain.   Gastrointestinal: Positive for abdominal pain. Negative for constipation, diarrhea, nausea and vomiting.   Musculoskeletal: Positive for arthralgias (right knee ).   Neurological: Positive for weakness (generalized). Negative for syncope and headaches.   All other systems reviewed and are negative.    Physical Exam   First Vitals:  BP: 129/82  Temp: 97.7  F (36.5  C)  Temp src: Oral  Pulse: 80  Resp: 18  SpO2: 98 %  Height: 154.9 cm (5' 1\")  Weight: 121.1 kg (267 lb) (08/15 0927)     Physical Exam  General: Sleepy but rousable, appears well-developed and well-nourished. Cooperative.     In mild distress  HEENT:  Head:  Atraumatic  Ears:  External ears are normal  Mouth/Throat:  Oropharynx is without erythema or exudate and mucous membranes are moist   Eyes:   Conjunctivae normal and EOM are " normal. No scleral icterus.    Pupils are equal, round, and reactive to light.   Neck:   Normal range of motion. Neck supple.  CV:  Normal rate, regular rhythm, normal heart sounds and radial and dorsalis pedis pulses are 2+ and symmetric.      No murmur.  Resp:  Breath sounds are clear bilaterally    Non-labored, no retractions or accessory muscle use  GI:  Abdomen is soft, no distension, no tenderness. No rebound or guarding.  MS:  Normal range of motion. No edema.    Normal strength in all 4 extremities.     Tenderness with flexion of right knee but no swelling compared to left.     Back atraumatic.  Skin:  Warm and dry.  No rash or lesions noted.  Neuro:  Alert. Normal strength.  Sensation intact in all 4 extremities.      GCS: 15    Cranial nerves 2-12 intact.  Psych:  Normal mood and affect.  Lymph: No anterior or posterior cervical lymphadenopathy noted.      Emergency Department Course   ECG (10:05:47):  Indication: Screening for cardiovascular disease.   Rate 83 bpm. MI interval 136 ms. QRS duration 84 ms. QT/QTc 376/441 ms. P-R-T axes 62 16 7.   Interpretation: Normal sinus rhythm, Normal ECG  Agree with computer interpretation. Yes  Interpreted at 1021 by Dr. Cardozo.      Imaging:  Radiographic findings were communicated with the patient and family who voiced understanding of the findings.    Head CT w/o Contrast:  IMPRESSION:  Normal CT scan of the head.  Per radiology.     XR Chest:  IMPRESSION: Mild pulmonary vascular congestion is seen. Cardiac silhouette within normal limits. No focal airspace disease, pleural effusion or pneumothorax.  Per radiology.     XR Knee, Right:  IMPRESSION: No fractures are seen in the right knee. Medial compartment predominant osteoarthritis with mild medial compartmental joint space loss again seen and unchanged.  Per radiology.     Laboratory:  CBC: WNL (WBC 6.4, HGB 13.2, )  CMP: Glucose 123 high, Albumin 3.3 low, o/w WNL (Creatinine 0.74)  Glucose by meter: 151  high   Troponin I 1100: <0.015  D dimer quantitative: 0.4   TSH: 0.98  HCG Qualitative Urine: Negative  UA reflex to Microscopic and Culture: Few bacteria, Squamous epithelial 14 high, Mucous present, o/w Negative       Emergency Department Course:  Nursing notes and vitals reviewed.  0936: I performed an exam of the patient as documented above.     1130: I updated and reassessed the patient.     1203: I updated and reassessed the patient.     Findings and plan explained to the Patient. Patient discharged home with instructions regarding supportive care, medications, and reasons to return. The importance of close follow-up was reviewed.     Impression & Plan      Medical Decision Making:  Tish De León is a 52 year old female with a history of depression, sleep apnea, and osteoarthritis who presents with a fall at home & weakness. The patient's story is most concerning for likely ingestion of sleep aid, Ambien, at home several hours prior to her fall. She describes being quite sleepy when she did have this fall. There was mild concern for a syncopal event although it does seem that this was witnessed by family at home and the patient did likely have a mechanical fall after further discussion with family. She did injure her right knee. X-ray is negative for acute fracture. Due to the fall, she also injured her head. CT was negative for intracranial bleed. She had a large workup for possible syncopal event. EKG was non-ischemic and unchanged from prior. Troponin was negative. To evaluate for PE, I obtained a D-dimer which was negative. Labs were otherwise grossly unremarkable. Urine negative for a UTI and pyelonephritis. Chest X-ray negative for pneumonia and pneumothorax. No clear etiology for the patient's fall, although most concerned for mechanical fall secondary to sleepiness from her Ambien which she took just a few hours prior to the actual event. We discussed following up with primary care to evaluate for  possible polypharmacy and appropriate use of sleep aids. She will be discharged home to return to ED for worsening symptoms.  Discussed imaging and lab findings and results with family and patient.  All questions answered prior to discharge.      Diagnosis:    ICD-10-CM   1. Generalized muscle weakness M62.81     Disposition:  Discharged to home    Regis ZHAO am serving as a scribe at 9:36 AM on 8/15/2017 to document services personally performed by Dr. Cardozo, based on my observations and the provider's statements to me.     EMERGENCY DEPARTMENT       Capo Cardozo MD  08/15/17 1298

## 2017-08-15 NOTE — ED AVS SNAPSHOT
Emergency Department    [address] [facility] [address]    Phone:  [phone]    Fax:  [phone]                                       Tish De León   MRN: [id number]    Department:   Emergency Department   Date of Visit:  8/15/2017           After Visit Summary Signature Page     I have received my discharge instructions, and my questions have been answered. I have discussed any challenges I see with this plan with the nurse or doctor.    ..........................................................................................................................................  Patient/Patient Representative Signature      ..........................................................................................................................................  Patient Representative Print Name and Relationship to Patient    ..................................................               ................................................  Date                                            Time    ..........................................................................................................................................  Reviewed by Signature/Title    ...................................................              ..............................................  Date                                                            Time

## 2017-08-15 NOTE — ED AVS SNAPSHOT
Emergency Department    [address] [facility] [address]    Phone:  [phone]    Fax:  [phone]                                       Tish De León   MRN: [id number]    Department:   Emergency Department   Date of Visit:  8/15/2017           Patient Information     Date Of Birth          1965        Your diagnoses for this visit were:     Generalized muscle weakness        You were seen by Capo Cardozo MD.      Follow-up Information     Follow up with  Emergency Department.    Specialty:  EMERGENCY MEDICINE    Why:  If symptoms worsen    Contact information:    [address] [facility] [id number] [phone]        Follow up with Willi Aguilar MD. Schedule an appointment as soon as possible for a visit in 2 days.    Specialty:  Family Practice    Why:  for re-check and to ensure improvement in symptoms from today's ED visit    Contact information:    [address] [place] [address] DR  [place] [address]  [phone]          Discharge Instructions         Weakness (Uncertain Cause)  Based on your exam today, the exact cause of your weakness is not certain. However, your weakness does not seem to be a sign of a serious illness at this time. Keep an eye on your symptoms and get medical advice as instructed below.  Home care    Rest at home today. Do not over-exert yourself.    Take any medicine as prescribed.    For the next few days, drink extra fluids (unless your healthcare provider wants you to restrict fluids for other reasons). Do not skip meals.  Follow-up care  Follow up with your healthcare provider or as advised.  When to seek medical advice  Call your healthcare provider for any of the following    Worsening of your symptoms    Symptoms don't start getting better within 2 days    Fever of 100.4  F (38  C) or higher, or as directed by your healthcare provider     Call 911  Get emergency medical care for any of these:    Chest, arm, neck, jaw or upper back  pain    Trouble breathing    Numbness or weakness of the face, one arm or one leg    Slurred speech, confusion, trouble speaking, walking or seeing    Blood in vomit or stool (black or red color)    Loss of consciousness  Date Last Reviewed: 6/10/2015    [phone] [employer]. [address] [place] [address]. All rights reserved. This information is not intended as a substitute for professional medical care. Always follow your healthcare professional's instructions.          Future Appointments        Provider Department Dept Phone Center    8/16/2017 2:40 PM Willi Aguilar MD [facility] Sarah Crisp [phone] EC    10/25/2017 2:00 PM Renay Layton MD [facility] [place] [phone] [facility]      24 Hour Appointment Hotline       To make an appointment at any [facility], call [phone] ([phone]). If you don't have a family doctor or clinic, we will help you find one. [facility] are conveniently located to serve the needs of you and your family.             Review of your medicines      Our records show that you are taking the medicines listed below. If these are incorrect, please call your family doctor or clinic.        Dose / Directions Last dose taken    albuterol 108 (90 BASE) MCG/ACT Inhaler   Commonly known as:  PROAIR HFA/PROVENTIL HFA/VENTOLIN HFA   Dose:  2 puff   Quantity:  3 Inhaler        Inhale 2 puffs into the lungs every 6 hours as needed for shortness of breath / dyspnea   Refills:  1        calcium 600 MG tablet   Dose:  600 mg   Quantity:  60 tablet        Take 1 tablet (600 mg) by mouth 2 times daily   Refills:  11        dexlansoprazole 30 MG Cpdr CR capsule   Commonly known as:  DEXILANT   Dose:  30 mg   Quantity:  90 capsule        Take 1 capsule (30 mg) by mouth daily   Refills:  1        diclofenac 75 MG EC tablet   Commonly known as:  VOLTAREN        Refills:  0        fluticasone-salmeterol 250-50 MCG/DOSE diskus  inhaler   Commonly known as:  ADVAIR   Dose:  1 puff   Quantity:  1 Inhaler        Inhale 1 puff into the lungs 2 times daily   Refills:  3        furosemide 20 MG tablet   Commonly known as:  LASIX   Dose:  20 mg   Quantity:  30 tablet        Take 1 tablet (20 mg) by mouth daily as needed   Refills:  3        HYDROcodone-acetaminophen 5-325 MG per tablet   Commonly known as:  NORCO   Dose:  1 tablet   Quantity:  15 tablet        Take 1 tablet by mouth every 6 hours as needed for moderate to severe pain   Refills:  0        ibuprofen 800 MG tablet   Commonly known as:  ADVIL/MOTRIN        Refills:  0        ketotifen 0.025 % Soln ophthalmic solution   Commonly known as:  ZADITOR   Dose:  1 drop   Quantity:  1 Bottle        Place 1 drop into both eyes every 12 hours   Refills:  11        montelukast 10 MG tablet   Commonly known as:  SINGULAIR   Dose:  10 mg   Quantity:  90 tablet        Take 1 tablet (10 mg) by mouth At Bedtime   Refills:  3        nystatin 350893 UNIT/GM Powd   Commonly known as:  MYCOSTATIN   Quantity:  30 g        Apply topically 3 times daily as needed   Refills:  3        pantoprazole 40 MG EC tablet   Commonly known as:  PROTONIX   Dose:  40 mg   Quantity:  30 tablet        Take 1 tablet (40 mg) by mouth daily for 30 doses   Refills:  0        polyethylene glycol powder   Commonly known as:  MIRALAX   Dose:  1 capful   Quantity:  510 g        Take 17 g (1 capful) by mouth daily as needed for constipation   Refills:  1        potassium chloride 10 MEQ tablet   Commonly known as:  K-TAB,KLOR-CON   Dose:  10 mEq   Quantity:  30 tablet        Take 1 tablet (10 mEq) by mouth daily as needed for potassium supplementation   Refills:  3        sucralfate 1 GM tablet   Commonly known as:  CARAFATE   Quantity:  40 tablet        Take 1 tablet (1 g) by mouth 4 times daily   Refills:  3        vitamin D 2000 UNITS tablet   Quantity:  100 tablet        Take 6000 IU daily for 6 weeks then take 2000 IU daily  therafter   Refills:  3                Procedures and tests performed during your visit     CBC with platelets differential    Cardiac Continuous Monitoring    Comprehensive metabolic panel    D dimer quantitative    EKG 12-lead, tracing only    Glucose by meter    Glucose monitor nursing POCT    HCG qualitative urine    Head CT w/o contrast    TSH    Troponin I    UA reflex to Microscopic and Culture    XR Chest 2 Views    XR Knee Right 1/2 Views      Orders Needing Specimen Collection     None      Pending Results     Date and Time Order Name Status Description    8/15/2017 0947 EKG 12-lead, tracing only Preliminary             Pending Culture Results     No orders found from 8/13/2017 to 8/16/2017.            Pending Results Instructions     If you had any lab results that were not finalized at the time of your Discharge, you can call the ED Lab Result RN at [phone]. You will be contacted by this team for any positive Lab results or changes in treatment. The nurses are available 7 days a week from 10A to 6:30P.  You can leave a message 24 hours per day and they will return your call.        Test Results From Your Hospital Stay        8/15/2017 11:53 AM      Narrative     CT SCAN OF THE HEAD WITHOUT CONTRAST   8/15/2017 10:34 AM     HISTORY:  Syncope, hit head on ground, positive for loss of  consciousness.    TECHNIQUE:  Axial images of the head and coronal reformations without  IV contrast material. Radiation dose for this scan was reduced using  automated exposure control, adjustment of the mA and/or kV according  to patient size, or iterative reconstruction technique.    COMPARISON: 8/4/2017    FINDINGS:  The ventricles are normal in size, shape and configuration.  The brain parenchyma and subarachnoid spaces are normal. There is no  evidence of intracranial hemorrhage, mass, acute infarct or anomaly.     The visualized portions of the sinuses and mastoids appear normal.  There is no evidence of trauma.         Impression     IMPRESSION:  Normal CT scan of the head.      ALEXANDER CARRENO MD         8/15/2017 11:57 AM      Component Results     Component Value Ref Range & Units Status    Color Urine Light Yellow  Final    Appearance Urine Slightly Cloudy  Final    Glucose Urine Negative NEG^Negative mg/dL Final    Bilirubin Urine Negative NEG^Negative Final    Ketones Urine Negative NEG^Negative mg/dL Final    Specific Gravity Urine 1.008 1.003 - 1.035 Final    Blood Urine Negative NEG^Negative Final    pH Urine 5.5 5.0 - 7.0 pH Final    Protein Albumin Urine Negative NEG^Negative mg/dL Final    Urobilinogen mg/dL Normal 0.0 - 2.0 mg/dL Final    Nitrite Urine Negative NEG^Negative Final    Leukocyte Esterase Urine Negative NEG^Negative Final    Source Midstream Urine  Final    RBC Urine <1 0 - 2 /HPF Final    WBC Urine 2 0 - 2 /HPF Final    Bacteria Urine Few (A) NEG^Negative /HPF Final    Squamous Epithelial /HPF Urine 14 (H) 0 - 1 /HPF Final    Mucous Urine Present (A) NEG^Negative /LPF Final    Hyaline Casts 2 0 - 2 /LPF Final         8/15/2017 11:59 AM      Component Results     Component Value Ref Range & Units Status    HCG Qual Urine Negative NEG^Negative Final         8/15/2017 10:30 AM      Narrative     XR CHEST 2 VW 8/15/2017 10:27 AM    COMPARISON: 6/30/2016    HISTORY: Syncope        Impression     IMPRESSION: Mild pulmonary vascular congestion is seen. Cardiac  silhouette within normal limits. No focal airspace disease, pleural  effusion or pneumothorax.    SHANIA HUANG               8/15/2017 11:12 AM      Component Results     Component Value Ref Range & Units Status    WBC 6.4 4.0 - 11.0 10e9/L Final    RBC Count 4.45 3.8 - 5.2 10e12/L Final    Hemoglobin 13.2 11.7 - 15.7 g/dL Final    Hematocrit 38.0 35.0 - 47.0 % Final    MCV 85 78 - 100 fl Final    MCH 29.7 26.5 - 33.0 pg Final    MCHC 34.7 31.5 - 36.5 g/dL Final    RDW 12.8 10.0 - 15.0 % Final    Platelet Count 306 150 - 450 10e9/L Final    Diff Method  Automated Method  Final    % Neutrophils 71.3 % Final    % Lymphocytes 21.3 % Final    % Monocytes 6.3 % Final    % Eosinophils 0.6 % Final    % Basophils 0.2 % Final    % Immature Granulocytes 0.3 % Final    Nucleated RBCs 0 0 /100 Final    Absolute Neutrophil 4.6 1.6 - 8.3 10e9/L Final    Absolute Lymphocytes 1.4 0.8 - 5.3 10e9/L Final    Absolute Monocytes 0.4 0.0 - 1.3 10e9/L Final    Absolute Eosinophils 0.0 0.0 - 0.7 10e9/L Final    Absolute Basophils 0.0 0.0 - 0.2 10e9/L Final    Abs Immature Granulocytes 0.0 0 - 0.4 10e9/L Final    Absolute Nucleated RBC 0.0  Final         8/15/2017 11:28 AM      Component Results     Component Value Ref Range & Units Status    Sodium 138 133 - 144 mmol/L Final    Potassium 3.9 3.4 - 5.3 mmol/L Final    Chloride 105 94 - 109 mmol/L Final    Carbon Dioxide 25 20 - 32 mmol/L Final    Anion Gap 8 3 - 14 mmol/L Final    Glucose 123 (H) 70 - 99 mg/dL Final    Urea Nitrogen 10 7 - 30 mg/dL Final    Creatinine 0.74 0.52 - 1.04 mg/dL Final    GFR Estimate 82 >60 mL/min/1.7m2 Final    Non  GFR Calc    GFR Estimate If Black >90 >60 mL/min/1.7m2 Final    African American GFR Calc    Calcium 8.8 8.5 - 10.1 mg/dL Final    Bilirubin Total 0.2 0.2 - 1.3 mg/dL Final    Albumin 3.3 (L) 3.4 - 5.0 g/dL Final    Protein Total 8.0 6.8 - 8.8 g/dL Final    Alkaline Phosphatase 109 40 - 150 U/L Final    ALT 34 0 - 50 U/L Final    AST 13 0 - 45 U/L Final         8/15/2017 11:21 AM      Component Results     Component Value Ref Range & Units Status    D Dimer 0.4 0.0 - 0.50 ug/ml FEU Final    This D-dimer assay is intended for use in conjunction with a clinical pretest   probability assessment model to exclude pulmonary embolism (PE) and deep   venous thrombosis (DVT) in outpatients suspected of PE or DVT. The cut-off   value is 0.5 ug/mL FEU.           8/15/2017 11:29 AM      Component Results     Component Value Ref Range & Units Status    Troponin I ES <0.015 0.000 - 0.045 ug/L  Final    The 99th percentile for upper reference range is 0.045 ug/L.  Troponin values   in the range of 0.045 - 0.120 ug/L may be associated with risks of adverse   clinical events.           8/15/2017 11:31 AM      Component Results     Component Value Ref Range & Units Status    TSH 0.98 0.40 - 4.00 mU/L Final         8/15/2017 10:06 AM      Component Results     Component Value Ref Range & Units Status    Glucose 151 (H) 70 - 99 mg/dL Final         8/15/2017 10:29 AM      Narrative     XR KNEE RT 1 /2 VW 8/15/2017 10:26 AM    COMPARISON: 1/5/2017    HISTORY: Pain after fall.         Impression     IMPRESSION: No fractures are seen in the right knee. Medial  compartment predominant osteoarthritis with mild medial compartmental  joint space loss again seen and unchanged.    SHANIA HUANG                Clinical Quality Measure: Blood Pressure Screening     Your blood pressure was checked while you were in the emergency department today. The last reading we obtained was  BP: 129/82 . Please read the guidelines below about what these numbers mean and what you should do about them.  If your systolic blood pressure (the top number) is less than 120 and your diastolic blood pressure (the bottom number) is less than 80, then your blood pressure is normal. There is nothing more that you need to do about it.  If your systolic blood pressure (the top number) is 120-139 or your diastolic blood pressure (the bottom number) is 80-89, your blood pressure may be higher than it should be. You should have your blood pressure rechecked within a year by a primary care provider.  If your systolic blood pressure (the top number) is 140 or greater or your diastolic blood pressure (the bottom number) is 90 or greater, you may have high blood pressure. High blood pressure is treatable, but if left untreated over time it can put you at risk for heart attack, stroke, or kidney failure. You should have your blood pressure rechecked by a  "primary care provider within the next 4 weeks.  If your provider in the emergency department today gave you specific instructions to follow-up with your doctor or provider even sooner than that, you should follow that instruction and not wait for up to 4 weeks for your follow-up visit.        Thank you for choosing [facility]       Thank you for choosing [facility] for your care. Our goal is always to provide you with excellent care. Hearing back from our patients is one way we can continue to improve our services. Please take a few minutes to complete the written survey that you may receive in the mail after you visit with us. Thank you!        Mouth Party Information     Mouth Party lets you send messages to your doctor, view your test results, renew your prescriptions, schedule appointments and more. To sign up, go to [email or web address] . Click on \"Log in\" on the left side of the screen, which will take you to the Welcome page. Then click on \"Sign up Now\" on the right side of the page.     You will be asked to enter the access code listed below, as well as some personal information. Please follow the directions to create your username and password.     Your access code is: [email or web address]  Expires: 2017  8:37 AM     Your access code will  in 90 days. If you need help or a new code, please call your [facility] clinic or [phone].        Care EveryWhere ID     This is your Care EveryWhere ID. This could be used by other organizations to access your [facility] medical records  [id number]        Equal Access to Services     KAT CATALAN : Hadii gwen Álvarez, waaxda luellis, qaybta kaalmada darrius, christoph martínez . So [facility] [phone].    ATENCIÓN: Si habla español, tiene a chappell disposición servicios gratuitos de asistencia lingüística. Llame al [phone].    We comply with applicable federal civil rights laws and [place] laws. We do not discriminate on the basis of " race, color, national origin, age, disability sex, sexual orientation or gender identity.            After Visit Summary       This is your record. Keep this with you and show to your community pharmacist(s) and doctor(s) at your next visit.

## 2017-08-16 NOTE — PROGRESS NOTES
SUBJECTIVE:   Tish De León is a 52 year old female who presents to clinic today for the following health issues:      ED/UC Followup:    Facility:  [facility]   Date of visit: 8/15/2017  Reason for visit: Fall   Current Status:  Pt said she feels the same        Emergency room notes reviewed.  Patient recently sustained a fall. She was taken to the emergency room for evaluation. Patient reported abdominal discomfort that woke her up at night. She was not able to go back to sleep therefore she took her sleep medication. A few hours after she got up and walking down the stairs she fell. She reports of feeling groggy at that time.  Medication list reviewed with the patient.    She complains of abdominal pain that is getting worse. She has history of ulcer noted on the endoscopy. Denies any blood noted in the stool or dark stools. Denies any vomiting or nausea.         Problem list and histories reviewed & adjusted, as indicated.  Additional history: as documented    Patient Active Problem List   Diagnosis     Major depressive disorder, single episode, severe (H)     ZOE (obstructive sleep apnea)     Vitamin D deficiency disease     Dependent edema     Sickle cell trait (H)     Anxiety     Morbid obesity, unspecified obesity type (H)     Duodenal ulcer without hemorrhage or perforation and without obstruction     Advanced directives, counseling/discussion     Binge eating disorder     Prediabetes     Primary osteoarthritis of right knee     Gastroesophageal reflux disease without esophagitis     Intermittent asthma, uncomplicated     Past Surgical History:   Procedure Laterality Date      SECTION  1993    x4     CHOLECYSTECTOMY  2015     ESOPHAGOSCOPY, GASTROSCOPY, DUODENOSCOPY (EGD), COMBINED  2013    Procedure: COMBINED ESOPHAGOSCOPY, GASTROSCOPY, DUODENOSCOPY (EGD), BIOPSY SINGLE OR MULTIPLE;  COMBINED ESOPHAGOSCOPY, GASTROSCOPY, DUODENOSCOPY (EGD);  Surgeon: Luis Ma MD;   Location:  GI     ESOPHAGOSCOPY, GASTROSCOPY, DUODENOSCOPY (EGD), COMBINED N/A 5/26/2016    Procedure: COMBINED ESOPHAGOSCOPY, GASTROSCOPY, DUODENOSCOPY (EGD);  Surgeon: Kirit Hutchinson MD;  Location:  GI     ESOPHAGOSCOPY, GASTROSCOPY, DUODENOSCOPY (EGD), COMBINED N/A 8/23/2016    Procedure: COMBINED ESOPHAGOSCOPY, GASTROSCOPY, DUODENOSCOPY (EGD), BIOPSY SINGLE OR MULTIPLE;  Surgeon: Kirit Hutchinson MD;  Location:  GI     ESOPHAGOSCOPY, GASTROSCOPY, DUODENOSCOPY (EGD), COMBINED N/A 5/26/2017    Procedure: COMBINED ESOPHAGOSCOPY, GASTROSCOPY, DUODENOSCOPY (EGD), BIOPSY SINGLE OR MULTIPLE;  COMBINED ESOPHAGOSCOPY, GASTROSCOPY, DUODENOSCOPY (EGD) (MAC SEDATION);  Surgeon: Meagan Ott MD;  Location:  GI     ORTHOPEDIC SURGERY      Knee surgery     SINUS SURGERY  10/2011    Nasal surgery       Social History   Substance Use Topics     Smoking status: Never Smoker     Smokeless tobacco: Never Used     Alcohol use No     Family History   Problem Relation Age of Onset     Bipolar Disorder Son      Hypertension Mother      Depression Sister      Anxiety Disorder Sister      Anxiety Disorder Paternal Aunt      Depression Paternal Aunt          Current Outpatient Prescriptions   Medication Sig Dispense Refill     acetaminophen (TYLENOL) 325 MG tablet Take 325-650 mg by mouth       TRAMADOL HCL PO Take 50 mg by mouth       fluticasone (FLONASE) 50 MCG/ACT spray 2 sprays       Linaclotide (LINZESS PO) Take 145 mcg by mouth every morning (before breakfast)       Methocarbamol (ROBAXIN PO) Take 750 mg by mouth       sucralfate (CARAFATE) 1 GM tablet Take 1 tablet (1 g) by mouth 4 times daily 40 tablet 3     zolpidem (AMBIEN) 10 MG tablet Take 0.5 tablets (5 mg) by mouth nightly as needed for sleep       dexlansoprazole (DEXILANT) 30 MG CPDR CR capsule Take 1 capsule (30 mg) by mouth daily 90 capsule 1     nystatin (MYCOSTATIN) 410701 UNIT/GM POWD Apply topically 3 times daily as needed 30 g 3     calcium  carbonate 600 MG tablet Take 1 tablet (600 mg) by mouth 2 times daily 60 tablet 11     furosemide (LASIX) 20 MG tablet Take 1 tablet (20 mg) by mouth daily as needed 30 tablet 3     potassium chloride (K-TAB,KLOR-CON) 10 MEQ tablet Take 1 tablet (10 mEq) by mouth daily as needed for potassium supplementation 30 tablet 3     montelukast (SINGULAIR) 10 MG tablet Take 1 tablet (10 mg) by mouth At Bedtime 90 tablet 3     Cholecalciferol (VITAMIN D) 2000 UNITS tablet Take 6000 IU daily for 6 weeks then take 2000 IU daily therafter 100 tablet 3     fluticasone-salmeterol (ADVAIR) 250-50 MCG/DOSE diskus inhaler Inhale 1 puff into the lungs 2 times daily 1 Inhaler 3     albuterol (PROAIR HFA/PROVENTIL HFA/VENTOLIN HFA) 108 (90 BASE) MCG/ACT Inhaler Inhale 2 puffs into the lungs every 6 hours as needed for shortness of breath / dyspnea 3 Inhaler 1     Allergies   Allergen Reactions     Lactose      Other reaction(s): Intolerance-Can't Take     No Clinical Screening - See Comments      PN: LW Other1: -Milk = GI upset  PN: LW FI1: ice cream,peanuts,pork \T\ more w/testing  LW FI2:         Reviewed and updated as needed this visit by clinical staff       Reviewed and updated as needed this visit by Provider         ROS:  C: NEGATIVE for fever, chills, change in weight  E/M: NEGATIVE for ear, mouth and throat problems  R: NEGATIVE for significant cough or SOB  CV: NEGATIVE for chest pain, palpitations or peripheral edema    OBJECTIVE:                                                    /81 (Cuff Size: Adult Regular)  Pulse 97  Temp 97.6  F (36.4  C) (Oral)  Wt 259 lb (117.5 kg)  SpO2 97%  BMI 48.94 kg/m2  Body mass index is 48.94 kg/(m^2).   GENERAL: healthy, alert, well nourished, well hydrated, no distress  HENT: ear canals- normal; TMs- normal; Nose- normal; Mouth- no ulcers, no lesions  NECK: no tenderness, no adenopathy, no asymmetry, no masses, no stiffness; thyroid- normal to palpation  RESP: lungs clear to  auscultation - no rales, no rhonchi, no wheezes  CV: regular rates and rhythm, normal S1 S2, no S3 or S4 and no murmur, no click or rub -  ABDOMEN: soft, no tenderness, no  hepatosplenomegaly, no masses, normal bowel sounds  MS: extremities- no gross deformities noted, no edema         ASSESSMENT/PLAN:                                                      1. Fall, subsequent encounter  Likely due to taking Ambien too close to waking up in the morning.  I also noticed that patient is on Ambien 10 mg ordered by another provider. Recommended dose for females for Ambien as 5 mg. Recommended to only as a 5 mg Ambien, oriented to hours before bedtime. Avoid using Ambien if she wakes up at night.  Avoid mixing tramadol with the Ambien as that could increase his penis. Also avoid mixing Robaxin with Ambien.    2. Duodenal ulcer without hemorrhage or perforation and without obstruction  Stop using Nsaids prescribed by orthopedics. Patient has been using meloxicam for pain in her knee.  Instead she can use tramadol prn.     Recommended to resume Dexilant. Adding back the Carafate, which helped her in the past with her symptoms.  Follow-up with gastroenterology if symptoms are not improving  - sucralfate (CARAFATE) 1 GM tablet; Take 1 tablet (1 g) by mouth 4 times daily  Dispense: 40 tablet; Refill: 3      Follow up with Provider - 2 months or sooner prn.     Willi Aguilar MD  [facility]

## 2017-08-18 ENCOUNTER — OFFICE VISIT (OUTPATIENT)
Dept: FAMILY MEDICINE | Facility: CLINIC | Age: 52
End: 2017-08-18
Payer: MEDICARE

## 2017-08-18 VITALS
SYSTOLIC BLOOD PRESSURE: 131 MMHG | TEMPERATURE: 97.6 F | HEART RATE: 97 BPM | DIASTOLIC BLOOD PRESSURE: 81 MMHG | BODY MASS INDEX: 48.94 KG/M2 | WEIGHT: 259 LBS | OXYGEN SATURATION: 97 %

## 2017-08-18 DIAGNOSIS — K26.9 DUODENAL ULCER WITHOUT HEMORRHAGE OR PERFORATION AND WITHOUT OBSTRUCTION: ICD-10-CM
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
## 2017-08-18 PROCEDURE — 99214 OFFICE O/P EST MOD 30 MIN: CPT | Performed by: FAMILY MEDICINE

## 2017-08-18 RX ORDER — ACETAMINOPHEN 325 MG/1
325-650 TABLET ORAL
COMMUNITY
Start: 2016-12-12 | End: 2017-09-06

## 2017-08-18 RX ORDER — ZOLPIDEM TARTRATE 10 MG/1
5 TABLET ORAL
COMMUNITY
Start: 2017-08-18

## 2017-08-18 RX ORDER — ZOLPIDEM TARTRATE 5 MG/1
10 TABLET ORAL
COMMUNITY
End: 2017-08-18

## 2017-08-18 RX ORDER — SUCRALFATE 1 G/1
TABLET ORAL
Qty: 40 TABLET | Refills: 3 | Status: SHIPPED | OUTPATIENT
Start: 2017-08-18 | End: 2023-08-09

## 2017-08-18 RX ORDER — FLUTICASONE PROPIONATE 50 MCG
2 SPRAY, SUSPENSION (ML) NASAL
COMMUNITY

## 2017-08-18 ASSESSMENT — ANXIETY QUESTIONNAIRES
3. WORRYING TOO MUCH ABOUT DIFFERENT THINGS: NEARLY EVERY DAY
GAD7 TOTAL SCORE: 13
IF YOU CHECKED OFF ANY PROBLEMS ON THIS QUESTIONNAIRE, HOW DIFFICULT HAVE THESE PROBLEMS MADE IT FOR YOU TO DO YOUR WORK, TAKE CARE OF THINGS AT HOME, OR GET ALONG WITH OTHER PEOPLE: NOT DIFFICULT AT ALL
7. FEELING AFRAID AS IF SOMETHING AWFUL MIGHT HAPPEN: NEARLY EVERY DAY
2. NOT BEING ABLE TO STOP OR CONTROL WORRYING: MORE THAN HALF THE DAYS
6. BECOMING EASILY ANNOYED OR IRRITABLE: MORE THAN HALF THE DAYS
1. FEELING NERVOUS, ANXIOUS, OR ON EDGE: NOT AT ALL
5. BEING SO RESTLESS THAT IT IS HARD TO SIT STILL: NOT AT ALL

## 2017-08-18 ASSESSMENT — PATIENT HEALTH QUESTIONNAIRE - PHQ9
SUM OF ALL RESPONSES TO PHQ QUESTIONS 1-9: 19
5. POOR APPETITE OR OVEREATING: NEARLY EVERY DAY

## 2017-08-18 NOTE — MR AVS SNAPSHOT
"              After Visit Summary   8/18/2017    Tish De León    MRN: [id number]           Patient Information     Date Of Birth          1965        Visit Information        Provider Department      8/18/2017 1:20 PM Willi Aguilar MD [facility] [place]        Today's Diagnoses     Duodenal ulcer without hemorrhage or perforation and without obstruction           Follow-ups after your visit        Follow-up notes from your care team     Return in about 2 weeks (around 9/1/2017) for Physical Exam, Lab Work.      Your next 10 appointments already scheduled     Oct 25, 2017  2:00 PM CDT   New Visit with Renay Layton MD   [facility] ([facility])    [address] [address] [phone]              Who to contact     If you have questions or need follow up information about today's clinic visit or your schedule please [facility] directly at [phone].  Normal or non-critical lab and imaging results will be communicated to you by TripleGifthart, letter or phone within 4 business days after the clinic has received the results. If you do not hear from us within 7 days, please contact the clinic through TripleGifthart or phone. If you have a critical or abnormal lab result, we will notify you by phone as soon as possible.  Submit refill requests through EdSurge or call your pharmacy and they will forward the refill request to us. Please allow 3 business days for your refill to be completed.          Additional Information About Your Visit        TripleGifthart Information     EdSurge lets you send messages to your doctor, view your test results, renew your prescriptions, schedule appointments and more. To sign up, go to [email or web address] . Click on \"Log in\" on the left side of the screen, which will take you to the Welcome page. Then click on \"Sign up Now\" on the right side of the page.     You will be asked to enter " the access code listed below, as well as some personal information. Please follow the directions to create your username and password.     Your access code is: [email or web address]  Expires: 2017  8:37 AM     Your access code will  in 90 days. If you need help or a new code, please call your [place] clinic or [phone].        Care EveryWhere ID     This is your Care EveryWhere ID. This could be used by other organizations to access your [place] medical records  [id number]        Your Vitals Were     Pulse Temperature Pulse Oximetry BMI (Body Mass Index)          97 97.6  F (36.4  C) (Oral) 97% 48.94 kg/m2         Blood Pressure from Last 3 Encounters:   17 131/81   08/15/17 129/82   17 147/84    Weight from Last 3 Encounters:   17 259 lb (117.5 kg)   08/15/17 267 lb (121.1 kg)   17 260 lb (117.9 kg)              Today, you had the following     No orders found for display         Today's Medication Changes          These changes are accurate as of: 17  1:54 PM.  If you have any questions, ask your nurse or doctor.               These medicines have changed or have updated prescriptions.        Dose/Directions    sucralfate 1 GM tablet   Commonly known as:  CARAFATE   This may have changed:  See the new instructions.   Used for:  Duodenal ulcer without hemorrhage or perforation and without obstruction   Changed by:  Willi Aguilar MD        Take 1 tablet (1 g) by mouth 4 times daily   Quantity:  40 tablet   Refills:  3       zolpidem 10 MG tablet   Commonly known as:  AMBIEN   This may have changed:    - medication strength  - how much to take  - when to take this  - reasons to take this   Changed by:  Willi Aguilar MD        Dose:  5 mg   Take 0.5 tablets (5 mg) by mouth nightly as needed for sleep   Refills:  0         Stop taking these medicines if you haven't already. Please contact your care team if you have questions.     diclofenac 75 MG EC tablet   Commonly known as:   VOLTAREN   Stopped by:  Willi Aguilar MD           ketotifen 0.025 % Soln ophthalmic solution   Commonly known as:  ZADITOR   Stopped by:  Willi Aguilar MD           MELOXICAM PO   Stopped by:  Willi Aguilar MD           pantoprazole 40 MG EC tablet   Commonly known as:  PROTONIX   Stopped by:  Willi Aguilar MD           polyethylene glycol powder   Commonly known as:  MIRALAX   Stopped by:  Willi Aguilar MD                Where to get your medicines      These medications were sent to [facility] [place] - [place], [place] - [address]  [address], [place] [address]     Phone:  [phone]     sucralfate 1 GM tablet                Primary Care Provider Office Phone # Fax #    Willi Aguilar [phone]       [address] [place] [address] [address]  [place] [address]        Equal Access to Services     [facility]: Hadii aad ku hadasho Soomaali, waaxda luqadaha, qaybta kaalmada adeegyada, waxay idiin hayaan bob khwaldo martínez . So [facility] [phone].    ATENCIÓN: Si yanla español, tiene a chappell disposición servicios gratuitos de asistencia lingüística. Llame al [phone].    We comply with applicable federal civil rights laws and [place] laws. We do not discriminate on the basis of race, color, national origin, age, disability sex, sexual orientation or gender identity.            Thank you!     Thank you for choosing [facility]  for your care. Our goal is always to provide you with excellent care. Hearing back from our patients is one way we can continue to improve our services. Please take a few minutes to complete the written survey that you may receive in the mail after your visit with us. Thank you!             Your Updated Medication List - Protect others around you: Learn how to safely use, store and throw away your medicines at www.disposemymeds.org.          This list is accurate as of: 8/18/17  1:54 PM.  Always use your most recent med  list.                   Brand Name Dispense Instructions for use Diagnosis    acetaminophen 325 MG tablet    TYLENOL     Take 325-650 mg by mouth        albuterol 108 (90 BASE) MCG/ACT Inhaler    PROAIR HFA/PROVENTIL HFA/VENTOLIN HFA    3 Inhaler    Inhale 2 puffs into the lungs every 6 hours as needed for shortness of breath / dyspnea    Mild persistent asthma without complication       calcium 600 MG tablet     60 tablet    Take 1 tablet (600 mg) by mouth 2 times daily        dexlansoprazole 30 MG Cpdr CR capsule    DEXILANT    90 capsule    Take 1 capsule (30 mg) by mouth daily    Duodenal ulcer without hemorrhage or perforation and without obstruction       fluticasone 50 MCG/ACT spray    FLONASE     2 sprays        fluticasone-salmeterol 250-50 MCG/DOSE diskus inhaler    ADVAIR    1 Inhaler    Inhale 1 puff into the lungs 2 times daily    Mild persistent asthma without complication       furosemide 20 MG tablet    LASIX    30 tablet    Take 1 tablet (20 mg) by mouth daily as needed    Swelling of limb       LINZESS PO      Take 145 mcg by mouth every morning (before breakfast)        montelukast 10 MG tablet    SINGULAIR    90 tablet    Take 1 tablet (10 mg) by mouth At Bedtime    Moderate persistent asthma without complication       nystatin 502105 UNIT/GM Powd    MYCOSTATIN    30 g    Apply topically 3 times daily as needed    Skin candidiasis       potassium chloride 10 MEQ tablet    K-TAB,KLOR-CON    30 tablet    Take 1 tablet (10 mEq) by mouth daily as needed for potassium supplementation    Swelling of limb       ROBAXIN PO      Take 750 mg by mouth        sucralfate 1 GM tablet    CARAFATE    40 tablet    Take 1 tablet (1 g) by mouth 4 times daily    Duodenal ulcer without hemorrhage or perforation and without obstruction       TRAMADOL HCL PO      Take 50 mg by mouth        vitamin D 2000 UNITS tablet     100 tablet    Take 6000 IU daily for 6 weeks then take 2000 IU daily therafter    Vitamin D  deficiency disease       zolpidem 10 MG tablet    AMBIEN     Take 0.5 tablets (5 mg) by mouth nightly as needed for sleep

## 2017-08-19 ASSESSMENT — ANXIETY QUESTIONNAIRES: GAD7 TOTAL SCORE: 13

## 2017-08-21 ENCOUNTER — TRANSFERRED RECORDS (OUTPATIENT)
Dept: HEALTH INFORMATION MANAGEMENT | Facility: CLINIC | Age: 52
End: 2017-08-21

## 2017-08-30 DIAGNOSIS — K21.9 GASTROESOPHAGEAL REFLUX DISEASE WITHOUT ESOPHAGITIS: Primary | ICD-10-CM

## 2017-08-30 NOTE — TELEPHONE ENCOUNTER
Routing refill request to provider for review/approval because:  Medication is reported/historical    Radha Collins RN

## 2017-08-30 NOTE — TELEPHONE ENCOUNTER
Omeprazole 20mg      Last Written Prescription Date: historical  Last Fill Quantity: ?,  # refills: ?   Last Office Visit with [facility], P or [facility] prescribing provider: 8/18/17                                         Next 5 appointments (look out 90 days)     Sep 05, 2017  1:00 PM CDT   Office Visit with Willi Aguilar MD   [facility] ([facility])    [address] [place] [address] [phone]

## 2017-09-01 ENCOUNTER — TRANSFERRED RECORDS (OUTPATIENT)
Dept: HEALTH INFORMATION MANAGEMENT | Facility: CLINIC | Age: 52
End: 2017-09-01

## 2017-09-06 ENCOUNTER — OFFICE VISIT (OUTPATIENT)
Dept: FAMILY MEDICINE | Facility: CLINIC | Age: 52
End: 2017-09-06
Payer: MEDICARE

## 2017-09-06 VITALS
OXYGEN SATURATION: 96 % | BODY MASS INDEX: 49.28 KG/M2 | TEMPERATURE: 99 F | HEART RATE: 74 BPM | SYSTOLIC BLOOD PRESSURE: 132 MMHG | WEIGHT: 261 LBS | HEIGHT: 61 IN | DIASTOLIC BLOOD PRESSURE: 88 MMHG

## 2017-09-06 DIAGNOSIS — R10.11 RUQ ABDOMINAL PAIN: Primary | ICD-10-CM

## 2017-09-06 DIAGNOSIS — J01.00 ACUTE MAXILLARY SINUSITIS, RECURRENCE NOT SPECIFIED: ICD-10-CM

## 2017-09-06 PROCEDURE — 99214 OFFICE O/P EST MOD 30 MIN: CPT | Performed by: FAMILY MEDICINE

## 2017-09-06 ASSESSMENT — PATIENT HEALTH QUESTIONNAIRE - PHQ9
5. POOR APPETITE OR OVEREATING: MORE THAN HALF THE DAYS
SUM OF ALL RESPONSES TO PHQ QUESTIONS 1-9: 16

## 2017-09-06 ASSESSMENT — ANXIETY QUESTIONNAIRES
2. NOT BEING ABLE TO STOP OR CONTROL WORRYING: MORE THAN HALF THE DAYS
7. FEELING AFRAID AS IF SOMETHING AWFUL MIGHT HAPPEN: MORE THAN HALF THE DAYS
GAD7 TOTAL SCORE: 12
6. BECOMING EASILY ANNOYED OR IRRITABLE: NEARLY EVERY DAY
1. FEELING NERVOUS, ANXIOUS, OR ON EDGE: NOT AT ALL
3. WORRYING TOO MUCH ABOUT DIFFERENT THINGS: MORE THAN HALF THE DAYS
5. BEING SO RESTLESS THAT IT IS HARD TO SIT STILL: SEVERAL DAYS

## 2017-09-06 NOTE — LETTER
My Asthma Action Plan  Name: Tish De León   YOB: 1965  Date: 9/6/2017   My doctor: Willi Aguilar MD   [facility]                     GREEN [facility]   Good Control    I feel good    No cough or wheeze    Can work, sleep and play without asthma symptoms       Take your asthma control medicine every day.     1. If exercise triggers your asthma, take your rescue medication    15 minutes before exercise or sports, and    During exercise if you have asthma symptoms  2. Spacer to use with inhaler: If you have a spacer, make sure to use it with your inhaler             YELLOW ZONE Getting Worse  I have ANY of these:    I do not feel good    Cough or wheeze    Chest feels tight    Wake up at night   1. Keep taking your Green Zone medications  2. Start taking your rescue medicine:    every 20 minutes for up to 1 hour. Then every 4 hours for 24-48 hours.  3. If you stay in the Yellow Zone for more than 12-24 hours, contact your doctor.  4. If you do not return to the Green Zone in 12-24 hours or you get worse, start taking your oral steroid medicine if prescribed by your provider.           RED ZONE Medical Alert - Get Help  I have ANY of these:    I feel awful    Medicine is not helping    Breathing getting harder    Trouble walking or talking    Nose opens wide to breathe       1. Take your rescue medicine NOW  2. If your provider has prescribed an oral steroid medicine, start taking it NOW  3. Call your doctor NOW  4. If you are still in the Red Zone after 20 minutes and you have not reached your doctor:    Take your rescue medicine again and    Call 911 or go to the emergency room right away    See your regular doctor within 2 weeks of an Emergency Room or Urgent Care visit for follow-up treatment.        Electronically signed by: Sarah J. Severson, September 6, 2017    Annual Reminders:  Meet with Asthma Educator,  Flu Shot in the Fall, consider Pneumonia Vaccination for  patients with asthma (aged 19 and older).    Pharmacy:    [id number] - [place], [place] - [address] [facility] [facility] [place] [address] [id number] - [facility] [place] - [address] [facility] - [place], [place] - [address]                    Asthma Triggers  How To Control Things That Make Your Asthma Worse    Triggers are things that make your asthma worse.  Look at the list below to help you find your triggers and what you can do about them.  You can help prevent asthma flare-ups by staying away from your triggers.      Trigger                                                          What you can do   Cigarette Smoke  Tobacco smoke can make asthma worse. Do not allow smoking in your home, car or around you.  Be sure no one smokes at a child s day care or school.  If you smoke, ask your health care provider for ways to help you quit.  Ask family members to quit too.  Ask your health care provider for a referral to Quit Plan to help you quit smoking, or call 0-756-611-PLAN.     Colds, Flu, Bronchitis  These are common triggers of asthma. Wash your hands often.  Don t touch your eyes, nose or mouth.  Get a flu shot every year.     Dust Mites  These are tiny bugs that live in cloth or carpet. They are too small to see. Wash sheets and blankets in hot water every week.   Encase pillows and mattress in dust mite proof covers.  Avoid having carpet if you can. If you have carpet, vacuum weekly.   Use a dust mask and HEPA vacuum.   Pollen and Outdoor Mold  Some people are allergic to trees, grass, or weed pollen, or molds. Try to keep your windows closed.  Limit time out doors when pollen count is high.   Ask you health care provider about taking medicine during allergy season.     Animal Dander  Some people are allergic to skin flakes, urine or saliva from pets with fur or feathers. Keep pets with fur or feathers out of  your home.    If you can t keep the pet outdoors, then keep the pet out of your bedroom.  Keep the bedroom door closed.  Keep pets off cloth furniture and away from stuffed toys.     Mice, Rats, and Cockroaches  Some people are allergic to the waste from these pests.   Cover food and garbage.  Clean up spills and food crumbs.  Store grease in the refrigerator.   Keep food out of the bedroom.   Indoor Mold  This can be a trigger if your home has high moisture. Fix leaking faucets, pipes, or other sources of water.   Clean moldy surfaces.  Dehumidify basement if it is damp and smelly.   Smoke, Strong Odors, and Sprays  These can reduce air quality. Stay away from strong odors and sprays, such as perfume, powder, hair spray, paints, smoke incense, paint, cleaning products, candles and new carpet.   Exercise or Sports  Some people with asthma have this trigger. Be active!  Ask your doctor about taking medicine before sports or exercise to prevent symptoms.    Warm up for 5-10 minutes before and after sports or exercise.     Other Triggers of Asthma  Cold air:  Cover your nose and mouth with a scarf.  Sometimes laughing or crying can be a trigger.  Some medicines and food can trigger asthma.

## 2017-09-06 NOTE — LETTER
My Depression Action Plan  Name: Tish De León   Date of Birth 1965  Date: 9/6/2017    My doctor: Willi Aguilar   My clinic: [address] [address]  [phone]          GREEN    ZONE   Good Control    What it looks like:     Things are going generally well. You have normal up s and down s. You may even feel depressed from time to time, but bad moods usually last less than a day.   What you need to do:  1. Continue to care for yourself (see self care plan)  2. Check your depression survival kit and update it as needed  3. Follow your physician s recommendations including any medication.  4. Do not stop taking medication unless you consult with your physician first.           YELLOW         ZONE Getting Worse    What it looks like:     Depression is starting to interfere with your life.     It may be hard to get out of bed; you may be starting to isolate yourself from others.    Symptoms of depression are starting to last most all day and this has happened for several days.     You may have suicidal thoughts but they are not constant.   What you need to do:     1. Call your care team, your response to treatment will improve if you keep your care team informed of your progress. Yellow periods are signs an adjustment may need to be made.     2. Continue your self-care, even if you have to fake it!    3. Talk to someone in your support network    4. Open up your depression survival kit           RED    ZONE Medical Alert - Get Help    What it looks like:     Depression is seriously interfering with your life.     You may experience these or other symptoms: You can t get out of bed most days, can t work or engage in other necessary activities, you have trouble taking care of basic hygiene, or basic responsibilities, thoughts of suicide or death that will not go away, self-injurious behavior.     What you need to do:  1. Call your care team  and request a same-day appointment. If they are not available (weekends or after hours) call your local crisis line, emergency room or 911.      Electronically signed by: Sarah J. Severson, September 6, 2017    Depression Self Care Plan / Survival Kit    Self-Care for Depression  Here s the deal. Your body and mind are really not as separate as most people think.  What you do and think affects how you feel and how you feel influences what you do and think. This means if you do things that people who feel good do, it will help you feel better.  Sometimes this is all it takes.  There is also a place for medication and therapy depending on how severe your depression is, so be sure to consult with your medical provider and/ or Behavioral Health Consultant if your symptoms are worsening or not improving.     In order to better manage my stress, I will:    Exercise  Get some form of exercise, every day. This will help reduce pain and release endorphins, the  feel good  chemicals in your brain. This is almost as good as taking antidepressants!  This is not the same as joining a gym and then never going! (they count on that by the way ) It can be as simple as just going for a walk or doing some gardening, anything that will get you moving.      Hygiene   Maintain good hygiene (Get out of bed in the morning, Make your bed, Brush your teeth, Take a shower, and Get dressed like you were going to work, even if you are unemployed).  If your clothes don't fit try to get ones that do.    Diet  I will strive to eat foods that are good for me, drink plenty of water, and avoid excessive sugar, caffeine, alcohol, and other mood-altering substances.  Some foods that are helpful in depression are: complex carbohydrates, B vitamins, flaxseed, fish or fish oil, fresh fruits and vegetables.    Psychotherapy  I agree to participate in Individual Therapy (if recommended).    Medication  If prescribed medications, I agree to take them.   Missing doses can result in serious side effects.  I understand that drinking alcohol, or other illicit drug use, may cause potential side effects.  I will not stop my medication abruptly without first discussing it with my provider.    Staying Connected With Others  I will stay in touch with my friends, family members, and my primary care provider/team.    Use your imagination  Be creative.  We all have a creative side; it doesn t matter if it s oil painting, sand castles, or mud pies! This will also kick up the endorphins.    Witness Beauty  (AKA stop and smell the roses) Take a look outside, even in mid-winter. Notice colors, textures. Watch the squirrels and birds.     Service to others  Be of service to others.  There is always someone else in need.  By helping others we can  get out of ourselves  and remember the really important things.  This also provides opportunities for practicing all the other parts of the program.    Humor  Laugh and be silly!  Adjust your TV habits for less news and crime-drama and more comedy.    Control your stress  Try breathing deep, massage therapy, biofeedback, and meditation. Find time to relax each day.     My support system    Clinic Contact:  Phone number:    Contact 1:  Phone number:    Contact 2:  Phone number:    Catholic/:  Phone number:    Therapist:  Phone number:    Local crisis center:    Phone number:    Other community support:  Phone number:

## 2017-09-06 NOTE — PROGRESS NOTES
SUBJECTIVE:   Tish De León is a 52 year old female who presents to clinic today for the following health issues:      ABDOMINAL   PAIN     Onset: last night    Description:   Character: Dull ache  Location: right upper quadrant  Radiation: Back    Intensity: moderate    Progression of Symptoms:  worsening    Accompanying Signs & Symptoms:  Fever/Chills?: no   Gas/Bloating: no   Nausea: no   Vomitting: no   Diarrhea?: no   Constipation:no   Dysuria or Hematuria: no    History:   Trauma: no   Previous similar pain: YES   Previous tests done: CT and Upper Endoscopy    Precipitating factors:   Does the pain change with:     Food: no      BM: no     Urination: no     Alleviating factors:  Feels weak and tired    Therapies Tried and outcome: None    LMP:  None       Complaints of sinus pressure, postnasal drainage and cough. Symptoms started a few days ago. Denies any fever. Reports of chills. Denies any shortness of breath or wheezing.        Problem list and histories reviewed & adjusted, as indicated.  Additional history: as documented    Patient Active Problem List   Diagnosis     Major depressive disorder, single episode, severe (H)     ZOE (obstructive sleep apnea)     Vitamin D deficiency disease     Dependent edema     Sickle cell trait (H)     Anxiety     Morbid obesity, unspecified obesity type (H)     Duodenal ulcer without hemorrhage or perforation and without obstruction     Advanced directives, counseling/discussion     Binge eating disorder     Prediabetes     Primary osteoarthritis of right knee     Gastroesophageal reflux disease without esophagitis     Intermittent asthma, uncomplicated     Past Surgical History:   Procedure Laterality Date      SECTION  1993    x4     CHOLECYSTECTOMY  2015     ESOPHAGOSCOPY, GASTROSCOPY, DUODENOSCOPY (EGD), COMBINED  2013    Procedure: COMBINED ESOPHAGOSCOPY, GASTROSCOPY, DUODENOSCOPY (EGD), BIOPSY SINGLE OR MULTIPLE;  COMBINED ESOPHAGOSCOPY,  GASTROSCOPY, DUODENOSCOPY (EGD);  Surgeon: Luis Ma MD;  Location:  GI     ESOPHAGOSCOPY, GASTROSCOPY, DUODENOSCOPY (EGD), COMBINED N/A 5/26/2016    Procedure: COMBINED ESOPHAGOSCOPY, GASTROSCOPY, DUODENOSCOPY (EGD);  Surgeon: Kirit Hutchinson MD;  Location:  GI     ESOPHAGOSCOPY, GASTROSCOPY, DUODENOSCOPY (EGD), COMBINED N/A 8/23/2016    Procedure: COMBINED ESOPHAGOSCOPY, GASTROSCOPY, DUODENOSCOPY (EGD), BIOPSY SINGLE OR MULTIPLE;  Surgeon: Kirit Hutchinson MD;  Location:  GI     ESOPHAGOSCOPY, GASTROSCOPY, DUODENOSCOPY (EGD), COMBINED N/A 5/26/2017    Procedure: COMBINED ESOPHAGOSCOPY, GASTROSCOPY, DUODENOSCOPY (EGD), BIOPSY SINGLE OR MULTIPLE;  COMBINED ESOPHAGOSCOPY, GASTROSCOPY, DUODENOSCOPY (EGD) (MAC SEDATION);  Surgeon: Meagan Ott MD;  Location:  GI     ORTHOPEDIC SURGERY      Knee surgery     SINUS SURGERY  10/2011    Nasal surgery       Social History   Substance Use Topics     Smoking status: Never Smoker     Smokeless tobacco: Never Used     Alcohol use No     Family History   Problem Relation Age of Onset     Bipolar Disorder Son      Hypertension Mother      Depression Sister      Anxiety Disorder Sister      Anxiety Disorder Paternal Aunt      Depression Paternal Aunt          Current Outpatient Prescriptions   Medication Sig Dispense Refill     amoxicillin-clavulanate (AUGMENTIN) 875-125 MG per tablet Take 1 tablet by mouth 2 times daily for 10 days 20 tablet 0     omeprazole (PRILOSEC) 20 MG CR capsule Take 1 capsule (20 mg) by mouth daily 90 capsule 3     TRAMADOL HCL PO Take 50 mg by mouth       fluticasone (FLONASE) 50 MCG/ACT spray 2 sprays       Linaclotide (LINZESS PO) Take 145 mcg by mouth every morning (before breakfast)       Methocarbamol (ROBAXIN PO) Take 750 mg by mouth       sucralfate (CARAFATE) 1 GM tablet Take 1 tablet (1 g) by mouth 4 times daily 40 tablet 3     zolpidem (AMBIEN) 10 MG tablet Take 0.5 tablets (5 mg) by mouth nightly as needed for sleep    "    dexlansoprazole (DEXILANT) 30 MG CPDR CR capsule Take 1 capsule (30 mg) by mouth daily 90 capsule 1     nystatin (MYCOSTATIN) 113515 UNIT/GM POWD Apply topically 3 times daily as needed 30 g 3     calcium carbonate 600 MG tablet Take 1 tablet (600 mg) by mouth 2 times daily 60 tablet 11     furosemide (LASIX) 20 MG tablet Take 1 tablet (20 mg) by mouth daily as needed 30 tablet 3     potassium chloride (K-TAB,KLOR-CON) 10 MEQ tablet Take 1 tablet (10 mEq) by mouth daily as needed for potassium supplementation 30 tablet 3     montelukast (SINGULAIR) 10 MG tablet Take 1 tablet (10 mg) by mouth At Bedtime 90 tablet 3     Cholecalciferol (VITAMIN D) 2000 UNITS tablet Take 6000 IU daily for 6 weeks then take 2000 IU daily therafter 100 tablet 3     fluticasone-salmeterol (ADVAIR) 250-50 MCG/DOSE diskus inhaler Inhale 1 puff into the lungs 2 times daily 1 Inhaler 3     albuterol (PROAIR HFA/PROVENTIL HFA/VENTOLIN HFA) 108 (90 BASE) MCG/ACT Inhaler Inhale 2 puffs into the lungs every 6 hours as needed for shortness of breath / dyspnea 3 Inhaler 1     Allergies   Allergen Reactions     Lactose      Other reaction(s): Intolerance-Can't Take     No Clinical Screening - See Comments      PN: LW Other1: -Milk = GI upset  PN: LW FI1: ice cream,peanuts,pork \T\ more w/testing  LW FI2:         Reviewed and updated as needed this visit by clinical staffTobacco  Allergies  Meds       Reviewed and updated as needed this visit by Provider         ROS:  INTEGUMENTARY/SKIN: NEGATIVE for worrisome rashes, moles or lesions  CV: NEGATIVE for chest pain, palpitations or peripheral edema    OBJECTIVE:                                                    /88  Pulse 74  Temp 99  F (37.2  C) (Tympanic)  Ht 5' 1\" (1.549 m)  Wt 261 lb (118.4 kg)  SpO2 96%  BMI 49.32 kg/m2  Body mass index is 49.32 kg/(m^2).   GENERAL: healthy, alert, well nourished, well hydrated, no distress  HENT: ear canals- normal; TMs- normal; Nose- normal; " Mouth- no ulcers, no lesions  NECK: no tenderness, no adenopathy, no asymmetry, no masses, no stiffness; thyroid- normal to palpation  RESP: lungs clear to auscultation - no rales, no rhonchi, no wheezes  CV: regular rates and rhythm, normal S1 S2, no S3 or S4 and no murmur, no click or rub -  ABDOMEN: soft, no tenderness, no  hepatosplenomegaly, no masses, normal bowel sounds  SKIN: no suspicious lesions, no rashes         ASSESSMENT/PLAN:                                                      1. RUQ abdominal pain  Previous records reviewed, which includes imaging studies and blood work to workup right upper quadrant pain. Also recent visit notes from the gastroenterologist reviewed, which suggest musculoskeletal or nerve cause of this pain.  Patient has had extensive workup for the right upper quadrant pain. No obvious etiology is identified with imaging and blood work. Patient also has seen gastroenterology recently for that.  I would recommend the patient to be seen by pain management to discuss options for nerve block.    - [facility] ([place]) REFERRAL    2. Acute maxillary sinusitis, recurrence not specified    The patient on a course of Augmentin for acute sinus infection.  - amoxicillin-clavulanate (AUGMENTIN) 875-125 MG per tablet; Take 1 tablet by mouth 2 times daily for 10 days  Dispense: 20 tablet; Refill: 0    Total time spent was 25 minutes, more than half the time was spent in counseling the patient about the disease pathogenesis, treatment plan and coordinating care.    Follow up with Provider - as needed     Willi Aguilar MD  [facility]

## 2017-09-06 NOTE — MR AVS SNAPSHOT
After Visit Summary   9/6/2017    Tish De León    MRN: [id number]           Patient Information     Date Of Birth          1965        Visit Information        Provider Department      9/6/2017 1:00 PM Willi Aguilar MD [facility] [place]        Today's Diagnoses     RUQ abdominal pain    -  1       Follow-ups after your visit        Additional Services     [facility] ([place]) REFERRAL       Your provider has referred you to the [facility].    Reason for Referral: Comprehensive Evaluation and Management    Please complete the following questions:    What is your diagnosis for the patient's pain? RUQ Abdominal wall pain.     Do you have any specific questions for the pain specialist? Yes: - wondering if a nerve block can help. Other work is negative    Are there any red flags that may impact the assessment or management of the patient? None    **ANY DIAGNOSTIC TESTS THAT ARE NOT IN EPIC SHOULD BE SENT TO THE PAIN CENTER**    Please note the Pre-Op Pain Consult must be scheduled 2-3 weeks prior to the patient's surgery.  Patient's trying to schedule within 2 weeks of surgery may not be accommodated.     Pre-Op Pain Consults are only good for 30 days.    REGARDING OPIOID MEDICATIONS:  We will always address appropriateness of opioid pain medications, but we generally will not automatically take on a prescribing role. When we do take on prescribing of opioids for chronic pain, it is in collaboration with the referring physician for an intermediate period of time (months), with an expectation that the primary physician or provider will assume the prescribing role if medications are effective at stable doses with demonstrated compliance.  Therefore, please do not assume that your prescribing responsibilities end on the day of pain clinic consultation.  Is this agreeable to you? YES    For any questions, contact the [facility] at [phone]  [phone].    Please be aware that coverage of these services is subject to the terms and limitations of your health insurance plan.  Call member services at your health plan with any benefit or coverage questions.      Please bring the following with you to your appointment:    (1) Any X-Rays, CTs or MRIs which have been performed.  Contact the facility where they were done to arrange for  prior to your scheduled appointment.    (2) List of current medications   (3) This referral request   (4) Any documents/labs given to you for this referral                  Your next 10 appointments already scheduled     Oct 25, 2017  2:00 PM CDT   New Visit with Renay Layton MD   [facility] [facility])    [address]  [place] [address] [phone]              Who to contact     If you have questions or need follow up information about today's clinic visit or your schedule please contact [facility] [place] directly at [phone].  Normal or non-critical lab and imaging results will be communicated to you by MyChart, letter or phone within 4 business days after the clinic has received the results. If you do not hear from us within 7 days, please contact the clinic through MyChart or phone. If you have a critical or abnormal lab result, we will notify you by phone as soon as possible.  Submit refill requests through [employer] or call your pharmacy and they will forward the refill request to us. Please allow 3 business days for your refill to be completed.          Additional Information About Your Visit        [employer] Information     [employer] lets you send messages to your doctor, view your test results, renew your prescriptions, schedule appointments and more. To sign up, go to [email or web address] . Click on \"Log in\" on the left side of the screen, which will take you to the Welcome page. Then click on \"Sign up Now\" on the right side of the " "page.     You will be asked to enter the access code listed below, as well as some personal information. Please follow the directions to create your username and password.     Your access code is: [email or web address]  Expires: 2017  1:29 PM     Your access code will  in 90 days. If you need help or a new code, please call your [facility] clinic or [phone].        Care EveryWhere ID     This is your Care EveryWhere ID. This could be used by other organizations to access your [facility] medical records  [id number]        Your Vitals Were     Pulse Temperature Height Pulse Oximetry BMI (Body Mass Index)       74 99  F (37.2  C) (Tympanic) 5' 1\" (1.549 m) 96% 49.32 kg/m2        Blood Pressure from Last 3 Encounters:   17 132/88   17 131/81   08/15/17 129/82    Weight from Last 3 Encounters:   17 261 lb (118.4 kg)   17 259 lb (117.5 kg)   08/15/17 267 lb (121.1 kg)              We Performed the Following     Asthma Action Plan (AAP)     DEPRESSION ACTION PLAN (DAP)     PA[facility] ([place]) REFERRAL          Today's Medication Changes          These changes are accurate as of: 17  1:29 PM.  If you have any questions, ask your nurse or doctor.               Stop taking these medicines if you haven't already. Please contact your care team if you have questions.     acetaminophen 325 MG tablet   Commonly known as:  TYLENOL   Stopped by:  Willi Aguilar MD                    Primary Care Provider Office Phone # Fax #    Willi Aguilar [phone]       [address]  [place] [address]        Equal Access to Services     [facility]: Pedro Álvarez, waalfada luellis, qaybta kaalmada darrius, christoph george. So [facility] [phone].    ATENCIÓN: Si habla español, tiene a chappell disposición servicios gratuitos de asistencia lingüística. Llame al [phone].    We comply with applicable federal civil rights laws and " [place] laws. We do not discriminate on the basis of race, color, national origin, age, disability sex, sexual orientation or gender identity.            Thank you!     Thank you for choosing [facility] [place]  for your care. Our goal is always to provide you with excellent care. Hearing back from our patients is one way we can continue to improve our services. Please take a few minutes to complete the written survey that you may receive in the mail after your visit with us. Thank you!             Your Updated Medication List - Protect others around you: Learn how to safely use, store and throw away your medicines at www.disposemymeds.org.          This list is accurate as of: 9/6/17  1:29 PM.  Always use your most recent med list.                   Brand Name Dispense Instructions for use Diagnosis    albuterol 108 (90 BASE) MCG/ACT Inhaler    PROAIR HFA/PROVENTIL HFA/VENTOLIN HFA    3 Inhaler    Inhale 2 puffs into the lungs every 6 hours as needed for shortness of breath / dyspnea    Mild persistent asthma without complication       calcium 600 MG tablet     60 tablet    Take 1 tablet (600 mg) by mouth 2 times daily        dexlansoprazole 30 MG Cpdr CR capsule    DEXILANT    90 capsule    Take 1 capsule (30 mg) by mouth daily    Duodenal ulcer without hemorrhage or perforation and without obstruction       fluticasone 50 MCG/ACT spray    FLONASE     2 sprays        fluticasone-salmeterol 250-50 MCG/DOSE diskus inhaler    ADVAIR    1 Inhaler    Inhale 1 puff into the lungs 2 times daily    Mild persistent asthma without complication       furosemide 20 MG tablet    LASIX    30 tablet    Take 1 tablet (20 mg) by mouth daily as needed    Swelling of limb       LINZESS PO      Take 145 mcg by mouth every morning (before breakfast)        montelukast 10 MG tablet    SINGULAIR    90 tablet    Take 1 tablet (10 mg) by mouth At Bedtime    Moderate persistent asthma without complication       nystatin 590828  UNIT/GM Powd    MYCOSTATIN    30 g    Apply topically 3 times daily as needed    Skin candidiasis       omeprazole 20 MG CR capsule    priLOSEC    90 capsule    Take 1 capsule (20 mg) by mouth daily    Gastroesophageal reflux disease without esophagitis       potassium chloride 10 MEQ tablet    K-TAB,KLOR-CON    30 tablet    Take 1 tablet (10 mEq) by mouth daily as needed for potassium supplementation    Swelling of limb       ROBAXIN PO      Take 750 mg by mouth        sucralfate 1 GM tablet    CARAFATE    40 tablet    Take 1 tablet (1 g) by mouth 4 times daily    Duodenal ulcer without hemorrhage or perforation and without obstruction       TRAMADOL HCL PO      Take 50 mg by mouth        vitamin D 2000 UNITS tablet     100 tablet    Take 6000 IU daily for 6 weeks then take 2000 IU daily therafter    Vitamin D deficiency disease       zolpidem 10 MG tablet    AMBIEN     Take 0.5 tablets (5 mg) by mouth nightly as needed for sleep

## 2017-09-06 NOTE — NURSING NOTE
"Chief Complaint   Patient presents with     Abdominal Pain       Initial /88  Pulse 74  Temp 99  F (37.2  C) (Tympanic)  Ht 5' 1\" (1.549 m)  Wt 261 lb (118.4 kg)  SpO2 96%  BMI 49.32 kg/m2 Estimated body mass index is 49.32 kg/(m^2) as calculated from the following:    Height as of this encounter: 5' 1\" (1.549 m).    Weight as of this encounter: 261 lb (118.4 kg).  Medication Reconciliation: complete  "

## 2017-09-07 ASSESSMENT — ANXIETY QUESTIONNAIRES: GAD7 TOTAL SCORE: 12

## 2017-09-13 ENCOUNTER — DOCUMENTATION ONLY (OUTPATIENT)
Dept: CARE COORDINATION | Facility: CLINIC | Age: 52
End: 2017-09-13

## 2017-09-13 NOTE — PROGRESS NOTES
[facility] now requests orders and shares plan of care/discharge summaries for some patients through [employer].  Please REPLY TO THIS MESSAGE in order to give authorization for orders when needed.  This is considered a verbal order, you will still receive a faxed copy of orders for signature.  Thank you for your assistance in improving collaboration for our patients.     It is noted that patient was started back on Omeprazole on 8/30 however patient already on Dexilant which is in the same drug class.  Requesting clarification on whether patient should be taking both medications or not.  Thank you.      Kayla rL [phone]

## 2017-09-13 NOTE — PROGRESS NOTES
[facility] now requests orders and shares plan of care/discharge summaries for some patients through [facility].  Please REPLY TO THIS MESSAGE in order to give authorization for orders when needed.  This is considered a verbal order, you will still receive a faxed copy of orders for signature.  Thank you for your assistance in improving collaboration for our patients.    ORDER    MD SUMMARY/PLAN OF CARE  Situation Alert and oriented x 5.  VSS, lungs CTA.  No open areas on skin and all skin assessed CDI.  No changes to diet or exercise.  Has been using walker or cane for ambulation. Appetite good. Mobility continues to be effected by pain levels.  Over the past 2 months pain in right knee pain improved and patient now reports that she is happy with the decision to have the injections for cortisone done in that knee. Pain reported in URQ of abdomen along with right flank pain and right knee pain.  Patient not taking any PO medications for pain. Over the past 2 months patient has been to the ED x 3 for increase in pain.  On 8/5, 8/16 and [facility] on 9/8.  States that she has had a headache since the vase fell on her head a few weeks ago and patient has a calcified area under the skin approximately the size of a half a pea.  No increase with tenderness when touching.  Bowel movements have improved with regular use of Linzess and reports bowel movements almost every day with with last BM this morning. This has not helped with pain in URQ however.  Voiding without difficulty.  Compliant with medications as ordered now that medications have been changed.  Several medications have been discontinued from patient profile since last cert and patient happy about being on less medications. Medication omeprazole back on medication profile in Epic and will verify with MD whether patient should be taking both omeprazole and dexilant as these are in the same drug class. Medications set up for 1 week and  refills continue to be called into [facility].  Background Patient is a 52 y/o female with referral to home care services after MD referral to address low back and leg pain.  PMH includes Morbid obesity, Major depressive disorder, Dependent edema, Intertrigo, Knee pain, Acute abdominal pain, Sickle cell trait, sleep apnea, GERD, asthma, duodenal ulcer, gastritis.   Analysis  Patient requiring assistance with medication management and general health management, compliance monitoring  Recommendation Weekly SN for medication management, patient receives PCA services for ADL and IADL assistance.

## 2017-09-13 NOTE — PROGRESS NOTES
[facility] now requests orders and shares plan of care/discharge summaries for some patients through [employer].  Please REPLY TO THIS MESSAGE in order to give authorization for orders when needed.  This is considered a verbal order, you will still receive a faxed copy of orders for signature.  Thank you for your assistance in improving collaboration for our patients.    It is noted that patient was started back on Omeprazole on 8/30 however patient already on Dexilant which is in the same drug class.  Requesting clarification on whether patient should be taking both medications or not.  Thank you.     Kayla Lr [phone]

## 2017-09-13 NOTE — PROGRESS NOTES
Continue Dexilant.   Discontinued omeprazole.   Thanks     Willi Aguilar MD  [facility] [place]

## 2017-09-14 NOTE — PROGRESS NOTES
AGREE WITH ORDERS    DISCONTINUE OMEPRAZOLE.     Willi Aguilar MD  [facility] [place]

## 2017-09-15 DIAGNOSIS — Z53.9 DIAGNOSIS NOT YET DEFINED: Primary | ICD-10-CM

## 2017-09-15 PROCEDURE — G0179 MD RECERTIFICATION HHA PT: HCPCS | Performed by: FAMILY MEDICINE

## 2017-10-13 DIAGNOSIS — Z53.9 DIAGNOSIS NOT YET DEFINED: Primary | ICD-10-CM

## 2017-10-13 PROCEDURE — G0180 MD CERTIFICATION HHA PATIENT: HCPCS | Performed by: FAMILY MEDICINE

## 2017-10-25 ENCOUNTER — OFFICE VISIT (OUTPATIENT)
Dept: PALLIATIVE MEDICINE | Facility: CLINIC | Age: 52
End: 2017-10-25
Payer: MEDICARE

## 2017-10-25 VITALS
HEIGHT: 61 IN | BODY MASS INDEX: 50.79 KG/M2 | HEART RATE: 91 BPM | DIASTOLIC BLOOD PRESSURE: 89 MMHG | WEIGHT: 269 LBS | SYSTOLIC BLOOD PRESSURE: 150 MMHG

## 2017-10-25 DIAGNOSIS — R10.9 CHRONIC ABDOMINAL PAIN: ICD-10-CM

## 2017-10-25 DIAGNOSIS — D57.3 SICKLE CELL TRAIT (H): ICD-10-CM

## 2017-10-25 DIAGNOSIS — G89.29 CHRONIC ABDOMINAL PAIN: ICD-10-CM

## 2017-10-25 DIAGNOSIS — M79.18 MYOFASCIAL PAIN: Primary | ICD-10-CM

## 2017-10-25 PROCEDURE — 99204 OFFICE O/P NEW MOD 45 MIN: CPT | Performed by: PSYCHIATRY & NEUROLOGY

## 2017-10-25 ASSESSMENT — PAIN SCALES - GENERAL: PAINLEVEL: WORST PAIN (10)

## 2017-10-25 NOTE — MR AVS SNAPSHOT
After Visit Summary   10/25/2017    Tish De León    MRN: [id number]           Patient Information     Date Of Birth          1965        Visit Information        Provider Department      10/25/2017 2:00 PM Renay Layton MD [facility] Instructions    1. Schedule physical therapy evaluation -yes  2. Schedule follow-up with Dr. Layton in 8 weeks  3. Injection therapy: consider trigger point injection  4. Medication recommendations:   1. Ok to try lidocaine on the skin  2. Try voltaren gel- I will prescribe that.  3. We discussed gabapentin which could be an option.  It may contribute to weight gain, but you also could feel better and become more active, leading to weight loss.  Your other medication conditions make other nerve pain medications difficult.  4. Try tizanidine - start with the lower dose.  Can be sedating.  Do not drive until you know how the medication affects you.   ----------------------------------------------------------------  Nurse Triage line:  [phone]   Call this number with any questions or concerns. You may leave a detailed message anytime. Calls are typically returned Monday through Friday between 8 AM and 4:30 PM. We usually get back to you within 2 business days depending on the issue/request.       Medication refills:    For non-narcotic medications, call your pharmacy directly to request a refill. The pharmacy will contact the [facility] for authorization. Please allow 3-4 days for these refills to be processed.     For narcotic refills, call the nurse triage line or send a [employer] message. Please contact us 7-10 days before your refill is due. The message MUST include the name of the specific medication(s) requested and how you would like to receive the prescription(s). The options are as follows:    Pain Clinic staff can mail the prescription to your pharmacy. Please tell us the name of the pharmacy.    You may  "pick the prescription up at the Pain Clinic (tell us the location) or during a clinic visit with your pain provider    Pain Clinic staff can deliver the prescription to the [facility] pharmacy in the clinic building. Please tell us the location.      Scheduling number: [phone].  Call this number to schedule or change appointments.    We believe regular attendance is key to your success in our program.    Any time you are unable to keep your appointment we ask that you call us at least 24 hours in advance to let us know. This will allow us to offer the appointment time to another patient.               Follow-ups after your visit        Who to contact     If you have questions or need follow up information about today's clinic visit or your schedule please contact [facility] directly at [phone].  Normal or non-critical lab and imaging results will be communicated to you by MyChart, letter or phone within 4 business days after the clinic has received the results. If you do not hear from us within 7 days, please contact the clinic through [employer] or phone. If you have a critical or abnormal lab result, we will notify you by phone as soon as possible.  Submit refill requests through [employer] or call your pharmacy and they will forward the refill request to us. Please allow 3 business days for your refill to be completed.          Additional Information About Your Visit        [employer] Information     [employer] lets you send messages to your doctor, view your test results, renew your prescriptions, schedule appointments and more. To sign up, go to [email or web address] . Click on \"Log in\" on the left side of the screen, which will take you to the Welcome page. Then click on \"Sign up Now\" on the right side of the page.     You will be asked to enter the access code listed below, as well as some personal information. Please follow the directions to create your username and password.     Your access code " "is: [email or web address]  Expires: 2017  1:29 PM     Your access code will  in 90 days. If you need help or a new code, please call your [facility] clinic or [phone].        Care EveryWhere ID     This is your Care EveryWhere ID. This could be used by other organizations to access your [facility] medical records  [id number]        Your Vitals Were     Pulse Height BMI (Body Mass Index)             91 1.549 m (5' 1\") 50.83 kg/m2          Blood Pressure from Last 3 Encounters:   10/25/17 150/89   17 132/88   17 131/81    Weight from Last 3 Encounters:   10/25/17 122 kg (269 lb)   17 118.4 kg (261 lb)   17 117.5 kg (259 lb)              Today, you had the following     No orders found for display         Today's Medication Changes          These changes are accurate as of: 10/25/17  3:15 PM.  If you have any questions, ask your nurse or doctor.               Stop taking these medicines if you haven't already. Please contact your care team if you have questions.     ROBAXIN PO           TRAMADOL HCL PO                    Primary Care Provider Office Phone # Fax #    Willi Aguilar [phone]       [address]  [place] [address]        Equal Access to Services     [facility]: Hadii gwen freitaso Sojulius, waaxda luqadaha, qaybta kaalchristoph conner. So [facility] [phone].    ATENCIÓN: Si habla español, tiene a chappell disposición servicios gratuitos de asistencia lingüística. Llame al [phone].    We comply with applicable federal civil rights laws and [place] laws. We do not discriminate on the basis of race, color, national origin, age, disability, sex, sexual orientation, or gender identity.            Thank you!     Thank you for choosing [facility]  for your care. Our goal is always to provide you with excellent care. Hearing back from our patients is one way we can continue to improve our services. " Please take a few minutes to complete the written survey that you may receive in the mail after your visit with us. Thank you!             Your Updated Medication List - Protect others around you: Learn how to safely use, store and throw away your medicines at www.disposemymeds.org.          This list is accurate as of: 10/25/17  3:15 PM.  Always use your most recent med list.                   Brand Name Dispense Instructions for use Diagnosis    albuterol 108 (90 BASE) MCG/ACT Inhaler    PROAIR HFA/PROVENTIL HFA/VENTOLIN HFA    3 Inhaler    Inhale 2 puffs into the lungs every 6 hours as needed for shortness of breath / dyspnea    Mild persistent asthma without complication       calcium 600 MG tablet     60 tablet    Take 1 tablet (600 mg) by mouth 2 times daily        dexlansoprazole 30 MG Cpdr CR capsule    DEXILANT    90 capsule    Take 1 capsule (30 mg) by mouth daily    Duodenal ulcer without hemorrhage or perforation and without obstruction       fluticasone 50 MCG/ACT spray    FLONASE     2 sprays        fluticasone-salmeterol 250-50 MCG/DOSE diskus inhaler    ADVAIR    1 Inhaler    Inhale 1 puff into the lungs 2 times daily    Mild persistent asthma without complication       furosemide 20 MG tablet    LASIX    30 tablet    Take 1 tablet (20 mg) by mouth daily as needed    Swelling of limb       LINZESS PO      Take 145 mcg by mouth every morning (before breakfast)        montelukast 10 MG tablet    SINGULAIR    90 tablet    Take 1 tablet (10 mg) by mouth At Bedtime    Moderate persistent asthma without complication       nystatin 177105 UNIT/GM Powd    MYCOSTATIN    30 g    Apply topically 3 times daily as needed    Skin candidiasis       potassium chloride 10 MEQ tablet    K-TAB,KLOR-CON    30 tablet    Take 1 tablet (10 mEq) by mouth daily as needed for potassium supplementation    Swelling of limb       sucralfate 1 GM tablet    CARAFATE    40 tablet    Take 1 tablet (1 g) by mouth 4 times daily     Duodenal ulcer without hemorrhage or perforation and without obstruction       vitamin D 2000 UNITS tablet     100 tablet    Take 6000 IU daily for 6 weeks then take 2000 IU daily therafter    Vitamin D deficiency disease       zolpidem 10 MG tablet    AMBIEN     Take 0.5 tablets (5 mg) by mouth nightly as needed for sleep

## 2017-10-25 NOTE — PROGRESS NOTES
[facility] Consultation    Date of visit: 10/25/2017    Reason for consultation:    Tish De León is a 52 year old female who is seen in consultation today at the request of her provider, Dr. Aguilar.      Primary Care Provider is Willi Aguilar.  Pain medications are being prescribed by Dr. Aguilar.    Please see the [facility] health questionnaire which the patient completed and reviewed with me in detail.    Chief Complaint:    Chief Complaint   Patient presents with     Pain     Pain history:  Tish De León is a 52 year old female with a past medical history of sickle cell, cardiomyopathy, cholecystectomy, and duodendal ulcers who first started having problems with pain in her right upper quadrant of her abdomen.  She describes the pain as achy, sharp, and deep and radiates to the back.  She denies rashes.  Pain has been present for over 5 years with an insidious onset.  Patient has followed with GI and PCP who did blood work which has been within normal limits.  Patient had cholecystectomy 3 years ago as a thought it could be causing her pain but it did not help.  She had a Lumbar MRI last year which was unremarkable.  Patient states she did a thoracic MRI last week but we do not have access to her images or the report.      Of note, patient completed Amoxicillin on 10/22 for a sinus infection.     Pain rating: intensity ranges from 7/10 to 10/10, and Averages 10/10 on a 0-10 scale.  Aggravating factors include: movement   Relieving factors include: nothing  Any bowel or bladder incontinence: denies    Current treatments include:  Denies taking any currently    Previous medication treatments included:  Tylenol #3  Ibuprofen  Vicodin  Norco  Oxycodone   Gabapentin- stopped because of weight gain    Other treatments have included:  Tish De León has not been seen at a pain clinic in the past.    PT: tried it but didn't help  Acupuncture: never  tried  TENs Unit: never tried  Injections: never tried    Past Medical History:  Past Medical History:   Diagnosis Date     Abnormal glucose 3/29/2017     Acute abdominal pain 2012     Anxiety 2012     Arthritis      Asthma      Cardiomyopathy, peripartum, postpartum      CHF (congestive heart failure) (H)     normal echo      Degenerative disc disease, thoracic      Dependent edema 8/10/2012     Depressive disorder      Family history of thyroid disease 3/29/2017     GERD (gastroesophageal reflux disease)      Hyperlipidemia      Obesity      Sickle cell trait (H) 2012     Sleep apnea     CPAP     Ulcer (H)      Ulcer (H)      Patient Active Problem List    Diagnosis Date Noted     Gastroesophageal reflux disease without esophagitis 2017     Priority: Medium     Intermittent asthma, uncomplicated 2017     Priority: Medium     Primary osteoarthritis of right knee 2017     Priority: Medium     Prediabetes 2017     Priority: Medium     Duodenal ulcer without hemorrhage or perforation and without obstruction 2016     Priority: Medium     Advanced directives, counseling/discussion 2016     Priority: Medium     Binge eating disorder 2016     Priority: Medium     Morbid obesity, unspecified obesity type (H) 2016     Priority: Medium     Sickle cell trait (H) 2012     Priority: Medium     Anxiety 2012     Priority: Medium     Dependent edema 08/10/2012     Priority: Medium     Vitamin D deficiency disease 2012     Priority: Medium     Major depressive disorder, single episode, severe (H) 2012     Priority: Medium     Problem list name updated by automated process. Provider to review       ZOE (obstructive sleep apnea) 2012     Priority: Medium     Uses CPAP         Past Surgical History:  Past Surgical History:   Procedure Laterality Date      SECTION  1993    x4     CHOLECYSTECTOMY  2015     ESOPHAGOSCOPY,  GASTROSCOPY, DUODENOSCOPY (EGD), COMBINED  8/26/2013    Procedure: COMBINED ESOPHAGOSCOPY, GASTROSCOPY, DUODENOSCOPY (EGD), BIOPSY SINGLE OR MULTIPLE;  COMBINED ESOPHAGOSCOPY, GASTROSCOPY, DUODENOSCOPY (EGD);  Surgeon: Luis Ma MD;  Location:  GI     ESOPHAGOSCOPY, GASTROSCOPY, DUODENOSCOPY (EGD), COMBINED N/A 5/26/2016    Procedure: COMBINED ESOPHAGOSCOPY, GASTROSCOPY, DUODENOSCOPY (EGD);  Surgeon: Kirit Hutchinson MD;  Location:  GI     ESOPHAGOSCOPY, GASTROSCOPY, DUODENOSCOPY (EGD), COMBINED N/A 8/23/2016    Procedure: COMBINED ESOPHAGOSCOPY, GASTROSCOPY, DUODENOSCOPY (EGD), BIOPSY SINGLE OR MULTIPLE;  Surgeon: Kirit Hutchinson MD;  Location:  GI     ESOPHAGOSCOPY, GASTROSCOPY, DUODENOSCOPY (EGD), COMBINED N/A 5/26/2017    Procedure: COMBINED ESOPHAGOSCOPY, GASTROSCOPY, DUODENOSCOPY (EGD), BIOPSY SINGLE OR MULTIPLE;  COMBINED ESOPHAGOSCOPY, GASTROSCOPY, DUODENOSCOPY (EGD) (MAC SEDATION);  Surgeon: Meagan Ott MD;  Location:  GI     ORTHOPEDIC SURGERY      Knee surgery     SINUS SURGERY  10/2011    Nasal surgery     Medications:  Current Outpatient Prescriptions   Medication Sig Dispense Refill     TRAMADOL HCL PO Take 50 mg by mouth       fluticasone (FLONASE) 50 MCG/ACT spray 2 sprays       Linaclotide (LINZESS PO) Take 145 mcg by mouth every morning (before breakfast)       Methocarbamol (ROBAXIN PO) Take 750 mg by mouth       sucralfate (CARAFATE) 1 GM tablet Take 1 tablet (1 g) by mouth 4 times daily 40 tablet 3     zolpidem (AMBIEN) 10 MG tablet Take 0.5 tablets (5 mg) by mouth nightly as needed for sleep       dexlansoprazole (DEXILANT) 30 MG CPDR CR capsule Take 1 capsule (30 mg) by mouth daily 90 capsule 1     nystatin (MYCOSTATIN) 693193 UNIT/GM POWD Apply topically 3 times daily as needed 30 g 3     calcium carbonate 600 MG tablet Take 1 tablet (600 mg) by mouth 2 times daily 60 tablet 11     furosemide (LASIX) 20 MG tablet Take 1 tablet (20 mg) by mouth daily as needed 30  tablet 3     potassium chloride (K-TAB,KLOR-CON) 10 MEQ tablet Take 1 tablet (10 mEq) by mouth daily as needed for potassium supplementation 30 tablet 3     montelukast (SINGULAIR) 10 MG tablet Take 1 tablet (10 mg) by mouth At Bedtime 90 tablet 3     Cholecalciferol (VITAMIN D) 2000 UNITS tablet Take 6000 IU daily for 6 weeks then take 2000 IU daily therafter 100 tablet 3     fluticasone-salmeterol (ADVAIR) 250-50 MCG/DOSE diskus inhaler Inhale 1 puff into the lungs 2 times daily 1 Inhaler 3     albuterol (PROAIR HFA/PROVENTIL HFA/VENTOLIN HFA) 108 (90 BASE) MCG/ACT Inhaler Inhale 2 puffs into the lungs every 6 hours as needed for shortness of breath / dyspnea 3 Inhaler 1     Allergies:     Allergies   Allergen Reactions     Lactose      Other reaction(s): Intolerance-Can't Take     No Clinical Screening - See Comments      PN: LW Other1: -Milk = GI upset  PN: LW FI1: ice cream,peanuts,pork \T\ more w/testing  LW FI2:     Social History:  Home situation: lives with kids  Occupation/Schooling: unemployed because of pain  Tobacco use: denies  Alcohol use: denies  Drug use: denies  History of chemical dependency treatment: denies    Family history:  Family History   Problem Relation Age of Onset     Bipolar Disorder Son      Hypertension Mother      Depression Sister      Anxiety Disorder Sister      Anxiety Disorder Paternal Aunt      Depression Paternal Aunt      Review of Systems:  Skin: negative  Eyes: negative  Ears/Nose/Throat: positive for sinus infection  Respiratory: positive for shortness of breath  Cardiovascular: positive for leg swelling  Gastrointestinal: positive for abdominal pain  Genitourinary: negative   Musculoskeletal: positive for joint pain, arthritis, stiffness, back pain  Neurologic: positive for weakness  Psychiatric: positive for depression, anxiety, and stress   Hematologic/Lymphatic/Immunologic: negative  Endocrine: negative    Physical Exam:  Vitals:    10/25/17 1416   BP: 150/89   Pulse:  "91   Weight: 122 kg (269 lb)   Height: 1.549 m (5' 1\")     Exam:  Constitutional: healthy, alert and no distress  Head: normocephalic. Atraumatic.   Eyes: no redness or jaundice noted   ENT: oropharnx normal.  MMM.  Neck supple.    Cardiovascular: no JVD  Respiratory: non labored breathing  Gastrointestinal: soft, non-tender, normoactive bowel sounds   : deferred  Skin: no suspicious lesions or rashes  Psychiatric: patient appears to be in pain throughout exam    Musculoskeletal exam:  Gait/Station/Posture: antalgic/ stooped posture    Thoracic spine:  TTP over right thoracic paraspinal muscles     Myofascial tenderness:  TTP over right thoracic paraspinal muscles       Neurologic exam:  CN:  Cranial nerves 2-12 are normal  Motor:  5/5 UE and LE strength  Sensory:  (upper and lower extremities):   Light touch: normal    Allodynia: absent    Dysethesia: absent    Hyperalgesia: absent     Diagnostic tests:  MRI of thoracic spine was completed on 10/3/17  showing:  TECHNIQUE:  MRI of the thoracic spine with and without contrast, 13 mL  GADOBUTROL 1 MMOL/ML IV SOLN.     COMPARISON:  None.             FINDINGS:  Minimal thoracic curve convex right.  Normal cord signal.  Normal marrow signal.  Incompletely visualized left renal cyst. Mild disc bulge effaces the ventral thecal sac at the C6-C7 level of the visualized cervical spine.    T5-T6: Mild disc bulge effaces the ventral left thecal sac eccentric to the left.    T7-T8: Mild disc bulge effaces the ventral thecal sac eccentric to the left.      (seen in Care Everywhere)      Screening tools:  DIRE Score for ongoing opioid management is calculated as follows:    Diagnosis = 1    Intractability = 1    Risk: Psych = 1  Chem Hlth = 3  Reliability = 2  Social = 2    Efficacy = 1    Total DIRE Score = 12 (14 or higher predicts good candidate for ongoing opioid management; 13 or lower predicts poor candidate for opioid management)       Assessment:  1. Chronic abdominal " pain  2. Abdominal muscle strain  3. Thoracic spine pain    Tish De León is a 52 year old female who presents with the complaints of chronic right upper quadrant abdominal pain with radiating features from the back.  Patient has had a full GI workup and has tried neuropathic agents with little success.  Pain is most likely musculoskeletal based on history and exam.  She is limited in neuropathic agents as she had weight gain from gabapentin and she has a history of kidney stones and can't be trialed on Topamax.  Can consider starting Tizanidine at a low dose to help with muscle spasms.  Will also prescribe lidocaine cream and voltaren gel to help topically.  Can consider trigger point injection if the prior treatments don't help.  Physical therapy will be the most important aspect of the patient's treatment.     Plan:  Diagnosis reviewed, treatment option addressed, and risk/benefits discussed.  Self-care instructions given.  I am recommending a multidisciplinary treatment plan to help this patient better manage her pain.      1. Physical Therapy: Ordered to promote weight loss, increase in endurance and aerobic activity and stretching/strengthening of core muscles. Encouraged retrying pool therapy.  2. Pain Psychologist to address issues of relaxation, behavioral change, coping style, and other factors important to improvement: not indicated at this time  3. Diagnostic Studies: not indicated at this time  4. Medication Management:   1. Lidocaine cream to apply on skin  2. Voltaren gel prescribed to apply to skin  3. Consider gabapentin but continue to monitor weight gain.    4. Start tizanidine- start with lower dose 2 mg at night  5. Further procedures recommended:   1. Consider trigger point injection  6. Acupuncture: not at this time  7. Urine toxicology screen today: N/A  8. Recommendations/follow-up for PCP:  Continue current treatment  9. Release of information: N/A  10. Follow up: 3 months      Earl  Black, DO  Pain Fellow    I saw and examined the patient with the Pain Fellow/Resident. I have reviewed and agree with the resident's note and plan of care and made changes and corrections directly to the body of the note.    TIME SPENT:  BY FELLOW/RESIDENT ALONE 30 MIN  BY MYSELF AND FELLOW/RESIDENT TOGETHER 20 MIN  BY MYSELF WITHOUT THE FELLOW/RESIDENT 20 MIN    These times included 20 minutes I spent counseling her about her diagnosis and treatment options and coordination of care with the primary team    Vandana Layton MD  [facility]

## 2017-10-25 NOTE — PATIENT INSTRUCTIONS
1. Schedule physical therapy evaluation -yes  2. Schedule follow-up with Dr. Layton in 8 weeks  3. Injection therapy: consider trigger point injection  4. Medication recommendations:   1. Ok to try lidocaine on the skin  2. Try voltaren gel- I will prescribe that.  3. We discussed gabapentin which could be an option.  It may contribute to weight gain, but you also could feel better and become more active, leading to weight loss.  Your other medication conditions make other nerve pain medications difficult.  4. Try tizanidine - start with the lower dose.  Can be sedating.  Do not drive until you know how the medication affects you.   ----------------------------------------------------------------  Nurse Triage line:  [phone]   Call this number with any questions or concerns. You may leave a detailed message anytime. Calls are typically returned Monday through Friday between 8 AM and 4:30 PM. We usually get back to you within 2 business days depending on the issue/request.       Medication refills:    For non-narcotic medications, call your pharmacy directly to request a refill. The pharmacy will contact the [facility] for authorization. Please allow 3-4 days for these refills to be processed.     For narcotic refills, call the nurse triage line or send a [facility] message. Please contact us 7-10 days before your refill is due. The message MUST include the name of the specific medication(s) requested and how you would like to receive the prescription(s). The options are as follows:    Pain Clinic staff can mail the prescription to your pharmacy. Please tell us the name of the pharmacy.    You may pick the prescription up at the Pain Clinic (tell us the location) or during a clinic visit with your pain provider    Pain Clinic staff can deliver the prescription to the [facility] pharmacy in the clinic building. Please tell us the location.      Scheduling number: [phone].  Call this number to  schedule or change appointments.    We believe regular attendance is key to your success in our program.    Any time you are unable to keep your appointment we ask that you call us at least 24 hours in advance to let us know. This will allow us to offer the appointment time to another patient.

## 2017-10-25 NOTE — NURSING NOTE
"Chief Complaint   Patient presents with     Pain       Initial /89  Pulse 91  Ht 1.549 m (5' 1\")  Wt 122 kg (269 lb)  BMI 50.83 kg/m2 Estimated body mass index is 50.83 kg/(m^2) as calculated from the following:    Height as of this encounter: 1.549 m (5' 1\").    Weight as of this encounter: 122 kg (269 lb).  Medication Reconciliation: complete     Piero Campos MA      "

## 2017-10-26 ENCOUNTER — TELEPHONE (OUTPATIENT)
Dept: PALLIATIVE MEDICINE | Facility: CLINIC | Age: 52
End: 2017-10-26

## 2017-10-26 DIAGNOSIS — R10.9 CHRONIC ABDOMINAL PAIN: Primary | ICD-10-CM

## 2017-10-26 DIAGNOSIS — M79.18 MYOFASCIAL PAIN: ICD-10-CM

## 2017-10-26 DIAGNOSIS — G89.29 CHRONIC ABDOMINAL PAIN: Primary | ICD-10-CM

## 2017-10-26 RX ORDER — TIZANIDINE 2 MG/1
2-4 TABLET ORAL 3 TIMES DAILY PRN
Qty: 90 TABLET | Refills: 1 | Status: SHIPPED | OUTPATIENT
Start: 2017-10-26 | End: 2023-08-09

## 2017-10-26 NOTE — TELEPHONE ENCOUNTER
Patient called stating her prescription hadn't been sent to pharmacy. Please review and call patient.     Yolanda Rodriguez    [facility]

## 2017-10-26 NOTE — TELEPHONE ENCOUNTER
Pt called to check on her script. Please advise.      Oksana VARGAS    [facility] [place]

## 2017-10-26 NOTE — TELEPHONE ENCOUNTER
Plan from yesterdays appt pasted below. Will rout to Dr. Layton for more information on prescriptions to be sent to pharmacy for this patient.  Patient Instructions   1. Schedule physical therapy evaluation -yes  2. Schedule follow-up with Dr. Layton in 8 weeks  3. Injection therapy: consider trigger point injection  4. Medication recommendations:                       1. Ok to try lidocaine on the skin  2. Try voltaren gel- I will prescribe that.  3. We discussed gabapentin which could be an option.  It may contribute to weight gain, but you also could feel better and become more active, leading to weight loss.  Your other medication conditions make other nerve pain medications difficult.  4. Try tizanidine - start with the lower dose.  Can be sedating.  Do not drive until you know how the medication affects you.      Trixie Gonzalez, STANLEYN, RN  Care Coordinator  [facility]

## 2017-10-26 NOTE — TELEPHONE ENCOUNTER
Signed Prescriptions:                        Disp   Refills    diclofenac (VOLTAREN) 1 % GEL topical gel  100 g  1        Sig: Apply 2-4 grams to right torso four times daily as           needed using enclosed dosing card.  Authorizing Provider: STEF SINGH    tiZANidine (ZANAFLEX) 2 MG tablet          90 tab*1        Sig: Take 1-2 tablets (2-4 mg) by mouth 3 times daily as           needed for muscle spasms  Authorizing Provider: STEF SINGH MD  [facility]

## 2017-10-26 NOTE — TELEPHONE ENCOUNTER
Information below relayed to the patient.      STANLEY TranN, RN  Care Coordinator  [facility] [place]

## 2017-11-10 ASSESSMENT — PATIENT HEALTH QUESTIONNAIRE - PHQ9: SUM OF ALL RESPONSES TO PHQ QUESTIONS 1-9: 19

## 2017-11-15 ENCOUNTER — TELEPHONE (OUTPATIENT)
Dept: PALLIATIVE MEDICINE | Facility: CLINIC | Age: 52
End: 2017-11-15

## 2017-11-15 NOTE — LETTER
11/24/2017            RE: Tish De León   1965          To Whom It May Concern,       It is medically necessary to treat Ms. De León's chronic pain condition with specialized physical therapy that is provided at the [facility] [place] [facility].       It is important that Ms. De León receive transportation to the [facility] or [facility] locations to receive these unique services that are not available at other physical therapy clinics closer to her home.      Please contact the [facility] [place] [facility] at [phone] if you have further questions about the medical necessity of this treatment.     Thank you,        Renay Layton MD  [facility] [place]

## 2017-11-15 NOTE — TELEPHONE ENCOUNTER
Pt having issues getting transportation from [place] to [place]. Patient would like to know if there is anywhere else she can go that would be closer to her for PT.

## 2017-11-16 NOTE — TELEPHONE ENCOUNTER
Patients medical ride will not drive her to [place] for PT unless Dr. Layton providides a letter saying it is medically necessary.     Otherwise they will take her to a location closer to her home which is in [facility]. Patient reports the [facility] in [place] would be an acceptable location.     STANLEY TranN, RN  Care Coordinator  [facility] [place]

## 2017-11-16 NOTE — TELEPHONE ENCOUNTER
I am recommending pain PT  Will ask nurse to please send letter for patient stating it is medically necessary, as this is a unique service for patients with chronic pain, not offered by [facility]    Vandana Layton MD  [facility]

## 2017-11-24 NOTE — TELEPHONE ENCOUNTER
Letter drafted for review and processing when provider returns to clinic.     STANLEY ClarkeN, RN-BC  Patient Care Supervisor/Care Coordinator  [facility] [place]

## 2017-11-28 NOTE — TELEPHONE ENCOUNTER
Left a voicemail requesting information on where to mail her letter. Letter was provided to Dr. Layton to review and will be mailed when I hear back from patient. Nurse line number is provided.    DANIELA Tran, RN  Care Coordinator  [facility] [place]

## 2017-12-01 NOTE — TELEPHONE ENCOUNTER
Outreach X2. Left second voicemail. Will mail to patient and she can call if she would also like it mailed or faxed somewhere else. Nurse line provided.    STANLEY TranN, RN  Care Coordinator  [facility] [place]

## 2017-12-05 ENCOUNTER — MEDICAL CORRESPONDENCE (OUTPATIENT)
Dept: HEALTH INFORMATION MANAGEMENT | Facility: CLINIC | Age: 52
End: 2017-12-05

## 2018-02-09 ENCOUNTER — TRANSFERRED RECORDS (OUTPATIENT)
Dept: HEALTH INFORMATION MANAGEMENT | Facility: CLINIC | Age: 53
End: 2018-02-09

## 2018-04-25 ENCOUNTER — OFFICE VISIT (OUTPATIENT)
Dept: URGENT CARE | Facility: URGENT CARE | Age: 53
End: 2018-04-25
Payer: MEDICARE

## 2018-04-25 VITALS
TEMPERATURE: 98.7 F | OXYGEN SATURATION: 98 % | SYSTOLIC BLOOD PRESSURE: 138 MMHG | WEIGHT: 271 LBS | BODY MASS INDEX: 51.21 KG/M2 | HEART RATE: 104 BPM | RESPIRATION RATE: 18 BRPM | DIASTOLIC BLOOD PRESSURE: 85 MMHG

## 2018-04-25 DIAGNOSIS — L24.89 IRRITANT CONTACT DERMATITIS DUE TO OTHER AGENTS: ICD-10-CM

## 2018-04-25 DIAGNOSIS — E66.01 MORBID OBESITY, UNSPECIFIED OBESITY TYPE (H): ICD-10-CM

## 2018-04-25 DIAGNOSIS — R30.0 DYSURIA: Primary | ICD-10-CM

## 2018-04-25 DIAGNOSIS — N89.8 VAGINAL DISCHARGE: ICD-10-CM

## 2018-04-25 LAB
ALBUMIN UR-MCNC: NEGATIVE MG/DL
APPEARANCE UR: NORMAL
BACTERIA #/AREA URNS HPF: ABNORMAL /HPF
BILIRUB UR QL STRIP: NEGATIVE
COLOR UR AUTO: YELLOW
GLUCOSE UR STRIP-MCNC: NEGATIVE MG/DL
HGB UR QL STRIP: NEGATIVE
KETONES UR STRIP-MCNC: NEGATIVE MG/DL
LEUKOCYTE ESTERASE UR QL STRIP: NEGATIVE
NITRATE UR QL: NEGATIVE
NON-SQ EPI CELLS #/AREA URNS LPF: ABNORMAL /LPF
PH UR STRIP: 5.5 PH (ref 5–7)
RBC #/AREA URNS AUTO: ABNORMAL /HPF
SOURCE: NORMAL
SP GR UR STRIP: 1.02 (ref 1–1.03)
SPECIMEN SOURCE: NORMAL
UROBILINOGEN UR STRIP-ACNC: 0.2 EU/DL (ref 0.2–1)
WBC #/AREA URNS AUTO: ABNORMAL /HPF
WET PREP SPEC: NORMAL

## 2018-04-25 PROCEDURE — 81001 URINALYSIS AUTO W/SCOPE: CPT | Performed by: FAMILY MEDICINE

## 2018-04-25 PROCEDURE — 87210 SMEAR WET MOUNT SALINE/INK: CPT | Performed by: FAMILY MEDICINE

## 2018-04-25 PROCEDURE — 99213 OFFICE O/P EST LOW 20 MIN: CPT | Performed by: PHYSICIAN ASSISTANT

## 2018-04-25 ASSESSMENT — ENCOUNTER SYMPTOMS
ADENOPATHY: 0
NAUSEA: 0
FEVER: 0
CONSTIPATION: 0
NEUROLOGICAL NEGATIVE: 1
RECTAL PAIN: 0
ACTIVITY CHANGE: 0
ANAL BLEEDING: 0
MUSCULOSKELETAL NEGATIVE: 1
UNEXPECTED WEIGHT CHANGE: 0
VOMITING: 0
DIARRHEA: 0
EYES NEGATIVE: 1
CARDIOVASCULAR NEGATIVE: 1
ABDOMINAL PAIN: 0
BLOOD IN STOOL: 0
CHILLS: 0
GASTROINTESTINAL NEGATIVE: 1
FREQUENCY: 0
RESPIRATORY NEGATIVE: 1
HEMATURIA: 0
DYSURIA: 0
DIFFICULTY URINATING: 0

## 2018-04-25 NOTE — MR AVS SNAPSHOT
"              After Visit Summary   4/25/2018    Tish De León    MRN: [id number]           Patient Information     Date Of Birth          1965        Visit Information        Provider Department      4/25/2018 7:35 PM Andrea Smith PA-C [facility]        Today's Diagnoses     Dysuria    -  1    Vaginal discharge        Morbid obesity, unspecified obesity type (H)        Irritant contact dermatitis due to other agents           Follow-ups after your visit        Your next 10 appointments already scheduled     May 02, 2018  1:30 PM CDT   Return Visit with Renay Layton MD   [facility] ([facility])    [address] [address] [phone]              Who to contact     If you have questions or need follow up information about today's clinic visit or your schedule please contact [facility] directly at [phone].  Normal or non-critical lab and imaging results will be communicated to you by MyChart, letter or phone within 4 business days after the clinic has received the results. If you do not hear from us within 7 days, please contact the clinic through LiveVoxhart or phone. If you have a critical or abnormal lab result, we will notify you by phone as soon as possible.  Submit refill requests through GRAVIDI or call your pharmacy and they will forward the refill request to us. Please allow 3 business days for your refill to be completed.          Additional Information About Your Visit        LiveVoxhart Information     GRAVIDI lets you send messages to your doctor, view your test results, renew your prescriptions, schedule appointments and more. To sign up, go to [email or web address] . Click on \"Log in\" on the left side of the screen, which will take you to the Welcome page. Then click on \"Sign up Now\" on the right side of the page.     You will be asked to enter the access code listed below, as " well as some personal information. Please follow the directions to create your username and password.     Your access code is: [email or web address]  Expires: 2018  8:39 PM     Your access code will  in 90 days. If you need help or a new code, please call your [place] clinic or [phone].        Care EveryWhere ID     This is your Care EveryWhere ID. This could be used by other organizations to access your [facility] medical records  [id number]        Your Vitals Were     Pulse Temperature Respirations Pulse Oximetry BMI (Body Mass Index)       104 98.7  F (37.1  C) (Oral) 18 98% 51.21 kg/m2        Blood Pressure from Last 3 Encounters:   18 138/85   10/25/17 150/89   17 132/88    Weight from Last 3 Encounters:   18 271 lb (122.9 kg)   10/25/17 269 lb (122 kg)   17 261 lb (118.4 kg)              We Performed the Following     *UA reflex to Microscopic and Culture ([place] and [facility] (except [place] and [place])     Urine Microscopic     Wet prep        Primary Care Provider Office Phone # Fax #    Willi Aguilar [phone]        [facility] [address] [facility] [place] [address]        Equal Access to Services     [facility]: Hadii gwen ku hadasho Sopatyali, waaxda luqadaha, qaybta kaalmada adeegyada, christoph sheridan haylazarus martínez . So [facility] [phone].    ATENCIÓN: Si habla español, tiene a chappell disposición servicios gratuitos de asistencia lingüística. Llame al [phone].    We comply with applicable federal civil rights laws and [place] laws. We do not discriminate on the basis of race, color, national origin, age, disability, sex, sexual orientation, or gender identity.            Thank you!     Thank you for choosing [facility] [place]  for your care. Our goal is always to provide you with excellent care. Hearing back from our patients is one way we can continue to improve our services. Please take a few minutes to complete the  written survey that you may receive in the mail after your visit with us. Thank you!             Your Updated Medication List - Protect others around you: Learn how to safely use, store and throw away your medicines at www.disposemymeds.org.          This list is accurate as of 4/25/18  8:39 PM.  Always use your most recent med list.                   Brand Name Dispense Instructions for use Diagnosis    albuterol 108 (90 Base) MCG/ACT Inhaler    PROAIR HFA/PROVENTIL HFA/VENTOLIN HFA    3 Inhaler    Inhale 2 puffs into the lungs every 6 hours as needed for shortness of breath / dyspnea    Mild persistent asthma without complication       calcium 600 MG tablet     60 tablet    Take 1 tablet (600 mg) by mouth 2 times daily        dexlansoprazole 30 MG Cpdr CR capsule    DEXILANT    90 capsule    Take 1 capsule (30 mg) by mouth daily    Duodenal ulcer without hemorrhage or perforation and without obstruction       diclofenac 1 % Gel topical gel    VOLTAREN    100 g    Apply 2-4 grams to right torso four times daily as needed using enclosed dosing card.    Chronic abdominal pain, Myofascial pain       fluticasone 50 MCG/ACT spray    FLONASE     2 sprays        fluticasone-salmeterol 250-50 MCG/DOSE diskus inhaler    ADVAIR    1 Inhaler    Inhale 1 puff into the lungs 2 times daily    Mild persistent asthma without complication       furosemide 20 MG tablet    LASIX    30 tablet    Take 1 tablet (20 mg) by mouth daily as needed    Swelling of limb       LINZESS PO      Take 145 mcg by mouth every morning (before breakfast)        montelukast 10 MG tablet    SINGULAIR    90 tablet    Take 1 tablet (10 mg) by mouth At Bedtime    Moderate persistent asthma without complication       nystatin 276551 UNIT/GM Powd    MYCOSTATIN    30 g    Apply topically 3 times daily as needed    Skin candidiasis       potassium chloride 10 MEQ tablet    K-TAB,KLOR-CON    30 tablet    Take 1 tablet (10 mEq) by mouth daily as needed for  potassium supplementation    Swelling of limb       sucralfate 1 GM tablet    CARAFATE    40 tablet    Take 1 tablet (1 g) by mouth 4 times daily    Duodenal ulcer without hemorrhage or perforation and without obstruction       tiZANidine 2 MG tablet    ZANAFLEX    90 tablet    Take 1-2 tablets (2-4 mg) by mouth 3 times daily as needed for muscle spasms    Chronic abdominal pain, Myofascial pain       vitamin D 2000 units tablet     100 tablet    Take 6000 IU daily for 6 weeks then take 2000 IU daily therafter    Vitamin D deficiency disease       zolpidem 10 MG tablet    AMBIEN     Take 0.5 tablets (5 mg) by mouth nightly as needed for sleep

## 2018-04-26 NOTE — PROGRESS NOTES
Chief Complaint:    Chief Complaint   Patient presents with     Vaginal Problem     itching in private area- front and back       HPI: Tish De León is an 52 year old female who presents for evaluation and treatment of itching around her anus.  She has a significant Hx of morbid obestiy, sickle cell trait, a asthma.  This started roughly 1 month ago a comes and goes.  She has tried preparation H and this did not work.  She has not had any bleeding or pain with this.  Using toilet paper seems to irritate the area.        ROS:     Review of Systems   Constitutional: Negative for activity change, chills, fever and unexpected weight change.   HENT: Negative.    Eyes: Negative.    Respiratory: Negative.    Cardiovascular: Negative.    Gastrointestinal: Negative.  Negative for abdominal pain, anal bleeding, blood in stool, constipation, diarrhea, nausea, rectal pain and vomiting.   Genitourinary: Negative for difficulty urinating, dysuria, frequency, hematuria, pelvic pain, urgency and vaginal discharge.   Musculoskeletal: Negative.    Skin: Negative.  Negative for rash.   Allergic/Immunologic: Negative for food allergies and immunocompromised state.   Neurological: Negative.    Hematological: Negative for adenopathy.       No significant family Hx at this time.  Patient has never smoked.  Family History   Family History   Problem Relation Age of Onset     Bipolar Disorder Son      Hypertension Mother      Depression Sister      Anxiety Disorder Sister      Anxiety Disorder Paternal Aunt      Depression Paternal Aunt         Surgical History:  Past Surgical History:   Procedure Laterality Date      SECTION  1993    x4     CHOLECYSTECTOMY  2015     ESOPHAGOSCOPY, GASTROSCOPY, DUODENOSCOPY (EGD), COMBINED  2013    Procedure: COMBINED ESOPHAGOSCOPY, GASTROSCOPY, DUODENOSCOPY (EGD), BIOPSY SINGLE OR MULTIPLE;  COMBINED ESOPHAGOSCOPY, GASTROSCOPY, DUODENOSCOPY (EGD);  Surgeon: Luis Ma MD;  Location:   GI     ESOPHAGOSCOPY, GASTROSCOPY, DUODENOSCOPY (EGD), COMBINED N/A 5/26/2016    Procedure: COMBINED ESOPHAGOSCOPY, GASTROSCOPY, DUODENOSCOPY (EGD);  Surgeon: Kirit Hutchinson MD;  Location:  GI     ESOPHAGOSCOPY, GASTROSCOPY, DUODENOSCOPY (EGD), COMBINED N/A 8/23/2016    Procedure: COMBINED ESOPHAGOSCOPY, GASTROSCOPY, DUODENOSCOPY (EGD), BIOPSY SINGLE OR MULTIPLE;  Surgeon: Kirit Hutchinson MD;  Location:  GI     ESOPHAGOSCOPY, GASTROSCOPY, DUODENOSCOPY (EGD), COMBINED N/A 5/26/2017    Procedure: COMBINED ESOPHAGOSCOPY, GASTROSCOPY, DUODENOSCOPY (EGD), BIOPSY SINGLE OR MULTIPLE;  COMBINED ESOPHAGOSCOPY, GASTROSCOPY, DUODENOSCOPY (EGD) (MAC SEDATION);  Surgeon: Meagan Ott MD;  Location:  GI     ORTHOPEDIC SURGERY      Knee surgery     SINUS SURGERY  10/2011    Nasal surgery        Problem List:  Patient Active Problem List   Diagnosis     Major depressive disorder, single episode, severe (H)     ZOE (obstructive sleep apnea)     Vitamin D deficiency disease     Dependent edema     Sickle cell trait (H)     Anxiety     Morbid obesity, unspecified obesity type (H)     Duodenal ulcer without hemorrhage or perforation and without obstruction     Advanced directives, counseling/discussion     Binge eating disorder     Prediabetes     Primary osteoarthritis of right knee     Gastroesophageal reflux disease without esophagitis     Intermittent asthma, uncomplicated        Allergies:  Allergies   Allergen Reactions     Lactose      Other reaction(s): Intolerance-Can't Take     No Clinical Screening - See Comments      PN: LW Other1: -Milk = GI upset  PN: LW FI1: ice cream,peanuts,pork \T\ more w/testing  LW FI2:        Current Meds:    Current Outpatient Prescriptions:      albuterol (PROAIR HFA/PROVENTIL HFA/VENTOLIN HFA) 108 (90 BASE) MCG/ACT Inhaler, Inhale 2 puffs into the lungs every 6 hours as needed for shortness of breath / dyspnea, Disp: 3 Inhaler, Rfl: 1     calcium carbonate 600 MG tablet, Take 1  tablet (600 mg) by mouth 2 times daily, Disp: 60 tablet, Rfl: 11     Cholecalciferol (VITAMIN D) 2000 UNITS tablet, Take 6000 IU daily for 6 weeks then take 2000 IU daily therafter, Disp: 100 tablet, Rfl: 3     dexlansoprazole (DEXILANT) 30 MG CPDR CR capsule, Take 1 capsule (30 mg) by mouth daily, Disp: 90 capsule, Rfl: 1     diclofenac (VOLTAREN) 1 % GEL topical gel, Apply 2-4 grams to right torso four times daily as needed using enclosed dosing card., Disp: 100 g, Rfl: 1     fluticasone (FLONASE) 50 MCG/ACT spray, 2 sprays, Disp: , Rfl:      fluticasone-salmeterol (ADVAIR) 250-50 MCG/DOSE diskus inhaler, Inhale 1 puff into the lungs 2 times daily, Disp: 1 Inhaler, Rfl: 3     furosemide (LASIX) 20 MG tablet, Take 1 tablet (20 mg) by mouth daily as needed, Disp: 30 tablet, Rfl: 3     Linaclotide (LINZESS PO), Take 145 mcg by mouth every morning (before breakfast), Disp: , Rfl:      montelukast (SINGULAIR) 10 MG tablet, Take 1 tablet (10 mg) by mouth At Bedtime, Disp: 90 tablet, Rfl: 3     nystatin (MYCOSTATIN) 341748 UNIT/GM POWD, Apply topically 3 times daily as needed, Disp: 30 g, Rfl: 3     potassium chloride (K-TAB,KLOR-CON) 10 MEQ tablet, Take 1 tablet (10 mEq) by mouth daily as needed for potassium supplementation, Disp: 30 tablet, Rfl: 3     sucralfate (CARAFATE) 1 GM tablet, Take 1 tablet (1 g) by mouth 4 times daily, Disp: 40 tablet, Rfl: 3     tiZANidine (ZANAFLEX) 2 MG tablet, Take 1-2 tablets (2-4 mg) by mouth 3 times daily as needed for muscle spasms, Disp: 90 tablet, Rfl: 1     zolpidem (AMBIEN) 10 MG tablet, Take 0.5 tablets (5 mg) by mouth nightly as needed for sleep, Disp: , Rfl:      PHYSICAL EXAM:     Vital signs noted and reviewed by Andrea Smith  /85 (BP Location: Left arm, Patient Position: Chair, Cuff Size: Adult Large)  Pulse 104  Temp 98.7  F (37.1  C) (Oral)  Resp 18  Wt 271 lb (122.9 kg)  SpO2 98%  BMI 51.21 kg/m2     PEFR:  General appearance: healthy, alert and no  distress  Ears: R TM - normal: no effusions, no erythema, and normal landmarks, L TM - normal: no effusions, no erythema, and normal landmarks  Eyes: R normal, L normal  Nose: normal  Oropharynx: normal  Neck: supple and no adenopathy  Lungs: normal and clear to auscultation  Heart: S1, S2 normal, no murmur, click, rub or gallop, regular rate and rhythm  Abdomen: Abdomen soft, non-tender without masses or organomegaly       Labs:     Results for orders placed or performed in visit on 04/25/18   *UA reflex to Microscopic and Culture ([place] and [facility] (except [place] and [place])   Result Value Ref Range    Color Urine Yellow     Appearance Urine Slightly Cloudy     Glucose Urine Negative NEG^Negative mg/dL    Bilirubin Urine Negative NEG^Negative    Ketones Urine Negative NEG^Negative mg/dL    Specific Gravity Urine 1.020 1.003 - 1.035    Blood Urine Negative NEG^Negative    pH Urine 5.5 5.0 - 7.0 pH    Protein Albumin Urine Negative NEG^Negative mg/dL    Urobilinogen Urine 0.2 0.2 - 1.0 EU/dL    Nitrite Urine Negative NEG^Negative    Leukocyte Esterase Urine Negative NEG^Negative    Source Midstream Urine    Urine Microscopic   Result Value Ref Range    WBC Urine 0 - 5 OTO5^0 - 5 /HPF    RBC Urine O - 2 OTO2^O - 2 /HPF    Squamous Epithelial /LPF Urine Few FEW^Few /LPF    Bacteria Urine Few (A) NEG^Negative /HPF   Wet prep   Result Value Ref Range    Specimen Description Vagina     Wet Prep No Trichomonas seen     Wet Prep No yeast seen     Wet Prep No clue cells seen            ASSESSMENT:     1. Dysuria    2. Vaginal discharge    3. Morbid obesity, unspecified obesity type (H)    4. Irritant contact dermatitis due to other agents           PLAN:     Discussed her symptoms.  She declined a rectal exam tonight.  Most likely this is some contact irritation from the paper.  Patient instructed to start using baby wipes for the next week to see if this clears it up.  She will continue to work on her  weight.  Encouraged her to stay active.  She will follow up with her PCP in 1-2 weeks if symptoms are not improving.  Patient verbalized understanding and agreed with this plan.     Andrea Smith  4/25/2018, 8:24 PM

## 2018-04-27 ENCOUNTER — TRANSFERRED RECORDS (OUTPATIENT)
Dept: HEALTH INFORMATION MANAGEMENT | Facility: CLINIC | Age: 53
End: 2018-04-27

## 2018-09-12 NOTE — ED NOTES
2020 pt states not relief from GI cocktail. Pt asking for something else. Morphine ordered per MAR.  
Bed: ED06  Expected date: 3/31/17  Expected time: 6:20 PM  Means of arrival:   Comments:  710 51f abd pain  
Pt up to bathroom.  Pt able to transfer self from bed to wheelchair, wheelchair to toilet, and then back to room.  
No

## 2021-03-29 ENCOUNTER — TRANSFERRED RECORDS (OUTPATIENT)
Dept: HEALTH INFORMATION MANAGEMENT | Facility: CLINIC | Age: 56
End: 2021-03-29

## 2021-06-10 ENCOUNTER — TRANSFERRED RECORDS (OUTPATIENT)
Dept: HEALTH INFORMATION MANAGEMENT | Facility: CLINIC | Age: 56
End: 2021-06-10

## 2021-08-12 ENCOUNTER — TRANSFERRED RECORDS (OUTPATIENT)
Dept: HEALTH INFORMATION MANAGEMENT | Facility: CLINIC | Age: 56
End: 2021-08-12

## 2022-07-28 ENCOUNTER — HOSPITAL ENCOUNTER (EMERGENCY)
Facility: CLINIC | Age: 57
Discharge: HOME | End: 2022-07-28
Attending: EMERGENCY MEDICINE | Admitting: EMERGENCY MEDICINE
Payer: MEDICARE

## 2022-07-28 VITALS
OXYGEN SATURATION: 98 % | SYSTOLIC BLOOD PRESSURE: 154 MMHG | BODY MASS INDEX: 48.9 KG/M2 | HEIGHT: 61 IN | RESPIRATION RATE: 16 BRPM | TEMPERATURE: 98.3 F | HEART RATE: 95 BPM | WEIGHT: 259 LBS | DIASTOLIC BLOOD PRESSURE: 90 MMHG

## 2022-07-28 DIAGNOSIS — E86.0 MILD DEHYDRATION: ICD-10-CM

## 2022-07-28 DIAGNOSIS — G44.209 TENSION HEADACHE: ICD-10-CM

## 2022-07-28 DIAGNOSIS — R19.7 DIARRHEA, UNSPECIFIED TYPE: ICD-10-CM

## 2022-07-28 LAB
ALBUMIN UR-MCNC: NEGATIVE MG/DL
ANION GAP SERPL CALCULATED.3IONS-SCNC: 7 MMOL/L (ref 3–14)
APPEARANCE UR: CLEAR
BASOPHILS # BLD AUTO: 0 10E3/UL (ref 0–0.2)
BASOPHILS NFR BLD AUTO: 0 %
BILIRUB UR QL STRIP: NEGATIVE
BUN SERPL-MCNC: 10 MG/DL (ref 7–30)
CALCIUM SERPL-MCNC: 8.2 MG/DL (ref 8.5–10.1)
CHLORIDE BLD-SCNC: 108 MMOL/L (ref 94–109)
CO2 SERPL-SCNC: 26 MMOL/L (ref 20–32)
COLOR UR AUTO: ABNORMAL
CREAT SERPL-MCNC: 1.15 MG/DL (ref 0.52–1.04)
EOSINOPHIL # BLD AUTO: 0 10E3/UL (ref 0–0.7)
EOSINOPHIL NFR BLD AUTO: 1 %
ERYTHROCYTE [DISTWIDTH] IN BLOOD BY AUTOMATED COUNT: 12.8 % (ref 10–15)
FLUAV RNA SPEC QL NAA+PROBE: NEGATIVE
FLUBV RNA RESP QL NAA+PROBE: NEGATIVE
GFR SERPL CREATININE-BSD FRML MDRD: 55 ML/MIN/1.73M2
GLUCOSE BLD-MCNC: 113 MG/DL (ref 70–99)
GLUCOSE UR STRIP-MCNC: NEGATIVE MG/DL
HCT VFR BLD AUTO: 38 % (ref 35–47)
HGB BLD-MCNC: 12.8 G/DL (ref 11.7–15.7)
HGB UR QL STRIP: NEGATIVE
HOLD SPECIMEN: NORMAL
HOLD SPECIMEN: NORMAL
IMM GRANULOCYTES # BLD: 0 10E3/UL
IMM GRANULOCYTES NFR BLD: 0 %
KETONES UR STRIP-MCNC: NEGATIVE MG/DL
LEUKOCYTE ESTERASE UR QL STRIP: NEGATIVE
LYMPHOCYTES # BLD AUTO: 1.5 10E3/UL (ref 0.8–5.3)
LYMPHOCYTES NFR BLD AUTO: 31 %
MCH RBC QN AUTO: 29.6 PG (ref 26.5–33)
MCHC RBC AUTO-ENTMCNC: 33.7 G/DL (ref 31.5–36.5)
MCV RBC AUTO: 88 FL (ref 78–100)
MONOCYTES # BLD AUTO: 0.4 10E3/UL (ref 0–1.3)
MONOCYTES NFR BLD AUTO: 9 %
MUCOUS THREADS #/AREA URNS LPF: PRESENT /LPF
NEUTROPHILS # BLD AUTO: 2.8 10E3/UL (ref 1.6–8.3)
NEUTROPHILS NFR BLD AUTO: 59 %
NITRATE UR QL: NEGATIVE
NRBC # BLD AUTO: 0 10E3/UL
NRBC BLD AUTO-RTO: 0 /100
PH UR STRIP: 6 [PH] (ref 5–7)
PLATELET # BLD AUTO: 332 10E3/UL (ref 150–450)
POTASSIUM BLD-SCNC: 3.3 MMOL/L (ref 3.4–5.3)
RBC # BLD AUTO: 4.33 10E6/UL (ref 3.8–5.2)
RBC URINE: 0 /HPF
SARS-COV-2 RNA RESP QL NAA+PROBE: NEGATIVE
SODIUM SERPL-SCNC: 141 MMOL/L (ref 133–144)
SP GR UR STRIP: 1.01 (ref 1–1.03)
SQUAMOUS EPITHELIAL: 2 /HPF
UROBILINOGEN UR STRIP-MCNC: NORMAL MG/DL
WBC # BLD AUTO: 4.7 10E3/UL (ref 4–11)
WBC URINE: 1 /HPF

## 2022-07-28 PROCEDURE — 85025 COMPLETE CBC W/AUTO DIFF WBC: CPT | Performed by: EMERGENCY MEDICINE

## 2022-07-28 PROCEDURE — 96361 HYDRATE IV INFUSION ADD-ON: CPT

## 2022-07-28 PROCEDURE — 87636 SARSCOV2 & INF A&B AMP PRB: CPT | Performed by: EMERGENCY MEDICINE

## 2022-07-28 PROCEDURE — 80048 BASIC METABOLIC PNL TOTAL CA: CPT | Performed by: EMERGENCY MEDICINE

## 2022-07-28 PROCEDURE — 96375 TX/PRO/DX INJ NEW DRUG ADDON: CPT

## 2022-07-28 PROCEDURE — 36415 COLL VENOUS BLD VENIPUNCTURE: CPT | Performed by: EMERGENCY MEDICINE

## 2022-07-28 PROCEDURE — 250N000011 HC RX IP 250 OP 636: Performed by: EMERGENCY MEDICINE

## 2022-07-28 PROCEDURE — 258N000003 HC RX IP 258 OP 636: Performed by: EMERGENCY MEDICINE

## 2022-07-28 PROCEDURE — 99284 EMERGENCY DEPT VISIT MOD MDM: CPT | Mod: 25

## 2022-07-28 PROCEDURE — 250N000013 HC RX MED GY IP 250 OP 250 PS 637: Performed by: EMERGENCY MEDICINE

## 2022-07-28 PROCEDURE — 81001 URINALYSIS AUTO W/SCOPE: CPT | Performed by: EMERGENCY MEDICINE

## 2022-07-28 PROCEDURE — 96374 THER/PROPH/DIAG INJ IV PUSH: CPT

## 2022-07-28 PROCEDURE — 99284 EMERGENCY DEPT VISIT MOD MDM: CPT | Mod: CS | Performed by: EMERGENCY MEDICINE

## 2022-07-28 PROCEDURE — C9803 HOPD COVID-19 SPEC COLLECT: HCPCS

## 2022-07-28 RX ORDER — KETOROLAC TROMETHAMINE 15 MG/ML
15 INJECTION, SOLUTION INTRAMUSCULAR; INTRAVENOUS ONCE
Status: COMPLETED | OUTPATIENT
Start: 2022-07-28 | End: 2022-07-28

## 2022-07-28 RX ORDER — LOPERAMIDE HCL 2 MG
4 CAPSULE ORAL ONCE
Status: COMPLETED | OUTPATIENT
Start: 2022-07-28 | End: 2022-07-28

## 2022-07-28 RX ORDER — METOCLOPRAMIDE HYDROCHLORIDE 5 MG/ML
10 INJECTION INTRAMUSCULAR; INTRAVENOUS ONCE
Status: COMPLETED | OUTPATIENT
Start: 2022-07-28 | End: 2022-07-28

## 2022-07-28 RX ADMIN — LOPERAMIDE HYDROCHLORIDE 4 MG: 2 CAPSULE ORAL at 23:15

## 2022-07-28 RX ADMIN — KETOROLAC TROMETHAMINE 15 MG: 15 INJECTION, SOLUTION INTRAMUSCULAR; INTRAVENOUS at 23:08

## 2022-07-28 RX ADMIN — SODIUM CHLORIDE, POTASSIUM CHLORIDE, SODIUM LACTATE AND CALCIUM CHLORIDE 1000 ML: 600; 310; 30; 20 INJECTION, SOLUTION INTRAVENOUS at 23:06

## 2022-07-28 RX ADMIN — METOCLOPRAMIDE HYDROCHLORIDE 10 MG: 5 INJECTION INTRAMUSCULAR; INTRAVENOUS at 23:14

## 2022-07-28 NOTE — ED TRIAGE NOTES
Patient presents for eval of a headache starting yesterday. Pain is described as a pressure/throbbing. Pt has generalized body aches. Had taken tylenol this AM. Has diarrhea. Chills- has not taken her temp.      Triage Assessment     Row Name 07/28/22 [id number]       Triage Assessment (Adult)    Airway WDL WDL       Respiratory WDL    Respiratory WDL WDL       Cognitive/Neuro/Behavioral WDL    Cognitive/Neuro/Behavioral WDL WDL

## 2022-07-29 ASSESSMENT — ENCOUNTER SYMPTOMS
FEVER: 0
ABDOMINAL PAIN: 0
SHORTNESS OF BREATH: 0
DIARRHEA: 1

## 2022-07-29 NOTE — DISCHARGE INSTRUCTIONS
You can take Imodium/loperamide as needed for diarrhea.  2 tablets after first episode of diarrhea, with 1 tablet after each subsequent episode of diarrhea, up to 8 tablets daily.    Tylenol and ibuprofen as needed for pain.    Drink plenty of fluids.

## 2022-07-29 NOTE — ED PROVIDER NOTES
History     Chief Complaint   Patient presents with     Headache     Chills, generalized body aches.     Diarrhea     HPI  Tish De León is a 57 year old female who presents to the emergency department with concerns regarding mild generalized headache, body aches, in addition to diarrhea.  Symptoms began during the day yesterday.  She had otherwise been feeling well, and then began experiencing generally unwell feeling.  She had diffuse body aches, which subsequently developed into episodes of diarrhea.  She states that every time she sits down to go urinate she ends up having multiple episodes of watery stool.  There has been slight nausea, without any vomiting.  Has not taken her temperature at home.  No other ill contacts.  No cough, respiratory symptoms.  Denies any chest pain.  No abdominal pains.  No blood has been noted in the stool.  No recent changes in medications.    Allergies:  Allergies   Allergen Reactions     Lactose      Other reaction(s): Intolerance-Can't Take     No Clinical Screening - See Comments      PN: LW Other1: -Milk = GI upset  PN: LW FI1: ice cream,peanuts,pork \T\ more w/testing  LW FI2:       Problem List:    Patient Active Problem List    Diagnosis Date Noted     Gastroesophageal reflux disease without esophagitis 04/21/2017     Priority: Medium     Intermittent asthma, uncomplicated 04/21/2017     Priority: Medium     Primary osteoarthritis of right knee 04/11/2017     Priority: Medium     Prediabetes 03/29/2017     Priority: Medium     Duodenal ulcer without hemorrhage or perforation and without obstruction 08/25/2016     Priority: Medium     Advanced directives, counseling/discussion 08/25/2016     Priority: Medium     Binge eating disorder 08/25/2016     Priority: Medium     Morbid obesity, unspecified obesity type (H) 03/22/2016     Priority: Medium     Sickle cell trait (H) 08/21/2012     Priority: Medium     Anxiety 08/21/2012     Priority: Medium     Dependent edema  08/10/2012     Priority: Medium     Vitamin D deficiency disease 2012     Priority: Medium     Major depressive disorder, single episode, severe (H) 2012     Priority: Medium     Problem list name updated by automated process. Provider to review       ZOE (obstructive sleep apnea) 2012     Priority: Medium     Uses CPAP          Past Medical History:    Past Medical History:   Diagnosis Date     Abnormal glucose 3/29/2017     Acute abdominal pain 2012     Anxiety 2012     Arthritis      Asthma      Cardiomyopathy, peripartum, postpartum      CHF (congestive heart failure) (H)      Degenerative disc disease, thoracic      Dependent edema 8/10/2012     Depressive disorder      Family history of thyroid disease 3/29/2017     GERD (gastroesophageal reflux disease)      Hyperlipidemia      Obesity      Sickle cell trait (H) 2012     Sleep apnea      Ulcer (H)      Ulcer (H)        Past Surgical History:    Past Surgical History:   Procedure Laterality Date      SECTION  1993    x4     CHOLECYSTECTOMY  2015     ESOPHAGOSCOPY, GASTROSCOPY, DUODENOSCOPY (EGD), COMBINED  2013    Procedure: COMBINED ESOPHAGOSCOPY, GASTROSCOPY, DUODENOSCOPY (EGD), BIOPSY SINGLE OR MULTIPLE;  COMBINED ESOPHAGOSCOPY, GASTROSCOPY, DUODENOSCOPY (EGD);  Surgeon: Luis Ma MD;  Location: [facility]     ESOPHAGOSCOPY, GASTROSCOPY, DUODENOSCOPY (EGD), COMBINED N/A 2016    Procedure: COMBINED ESOPHAGOSCOPY, GASTROSCOPY, DUODENOSCOPY (EGD);  Surgeon: Kirit Hutchinson MD;  Location: [facility]     ESOPHAGOSCOPY, GASTROSCOPY, DUODENOSCOPY (EGD), COMBINED N/A 2016    Procedure: COMBINED ESOPHAGOSCOPY, GASTROSCOPY, DUODENOSCOPY (EGD), BIOPSY SINGLE OR MULTIPLE;  Surgeon: Kirit Hutchinson MD;  Location:  GI     ESOPHAGOSCOPY, GASTROSCOPY, DUODENOSCOPY (EGD), COMBINED N/A 2017    Procedure: COMBINED ESOPHAGOSCOPY, GASTROSCOPY, DUODENOSCOPY (EGD), BIOPSY SINGLE OR MULTIPLE;  COMBINED  "ESOPHAGOSCOPY, GASTROSCOPY, DUODENOSCOPY (EGD) (MAC SEDATION);  Surgeon: Meagan Ott MD;  Location:  GI     ORTHOPEDIC SURGERY      Knee surgery     SINUS SURGERY  10/2011    Nasal surgery       Family History:    Family History   Problem Relation Age of Onset     Bipolar Disorder Son      Hypertension Mother      Depression Sister      Anxiety Disorder Sister      Anxiety Disorder Paternal Aunt      Depression Paternal Aunt        Social History:  Marital Status:  Single [1]  Social History     Tobacco Use     Smoking status: Never Smoker     Smokeless tobacco: Never Used   Substance Use Topics     Alcohol use: No     Alcohol/week: 0.0 standard drinks     Drug use: No        Medications:    albuterol (PROAIR HFA/PROVENTIL HFA/VENTOLIN HFA) 108 (90 BASE) MCG/ACT Inhaler  calcium carbonate 600 MG tablet  Cholecalciferol (VITAMIN D) 2000 UNITS tablet  dexlansoprazole (DEXILANT) 30 MG CPDR CR capsule  diclofenac (VOLTAREN) 1 % GEL topical gel  fluticasone (FLONASE) 50 MCG/ACT spray  fluticasone-salmeterol (ADVAIR) 250-50 MCG/DOSE diskus inhaler  furosemide (LASIX) 20 MG tablet  Linaclotide (LINZESS PO)  montelukast (SINGULAIR) 10 MG tablet  nystatin (MYCOSTATIN) 318696 UNIT/GM POWD  potassium chloride (K-TAB,KLOR-CON) 10 MEQ tablet  sucralfate (CARAFATE) 1 GM tablet  tiZANidine (ZANAFLEX) 2 MG tablet  zolpidem (AMBIEN) 10 MG tablet          Review of Systems   Constitutional: Negative for fever.   Respiratory: Negative for shortness of breath.    Cardiovascular: Negative for chest pain.   Gastrointestinal: Positive for diarrhea. Negative for abdominal pain.   All other systems reviewed and are negative.      Physical Exam   BP: (!) 154/90  Pulse: 95  Temp: 98.3  F (36.8  C)  Resp: 16  Height: 154.9 cm (5' 1\")  Weight: 117.5 kg (259 lb)  SpO2: 98 %      Physical Exam  BP (!) 154/90   Pulse 95   Temp 98.3  F (36.8  C) (Tympanic)   Resp 16   Ht 1.549 m (5' 1\")   Wt 117.5 kg (259 lb)   SpO2 98%   BMI " 48.94 kg/m    General: alert, interactive, in no apparent distress  Head: atraumatic  Nose: no rhinorrhea or epistaxis  Ears: no external auditory canal discharge or bleeding.    Eyes: Sclera nonicteric. Conjunctiva noninjected. PERRL, EOMI  Neck: supple, no palp LAD  Lungs: CTAB  CV: RRR, S1/S2; peripheral pulses palpable and symmetric  Abdomen: soft, nt, nd, no guarding or rebound. Positive bowel sounds  Extremities: no cyanosis or edema  Skin: no rash or diaphoresis  Neuro: CN II-XII grossly intact, strength 5/5 in UE and LEs bilaterally, sensation intact to light touch in UE and LEs bilaterally;       ED Course     Mental Health Risk Assessment      PSS-3    Date and Time Over the past 2 weeks have you felt down, depressed, or hopeless? Over the past 2 weeks have you had thoughts of killing yourself? Have you ever attempted to kill yourself? When did this last happen? User   07/28/22 1734 no no yes more than 6 months ago SJ                     Procedures              Critical Care time:  none               Results for orders placed or performed during the hospital encounter of 07/28/22 (from the past 24 hour(s))   Symptomatic; Yes; 7/27/2022 Influenza A/B & SARS-CoV2 (COVID-19) Virus PCR Multiplex Nasopharyngeal    Specimen: Nasopharyngeal; Swab   Result Value Ref Range    Influenza A PCR Negative Negative    Influenza B PCR Negative Negative    SARS CoV2 PCR Negative Negative    Narrative    Testing was performed using the nba SARS-CoV-2 & Influenza A/B Assay on the nba Eli System. This test should be ordered for the detection of SARS-CoV-2 and influenza viruses in individuals who meet clinical and/or epidemiological criteria. Test performance is unknown in asymptomatic patients. This test is for in vitro diagnostic use under the FDA EUA for laboratories certified under CLIA to perform moderate and/or high complexity testing. This test has not been FDA cleared or approved. A negative result does not rule  out the presence of PCR inhibitors in the specimen or target RNA in concentration below the limit of detection for the assay. If only one viral target is positive but coinfection with multiple targets is suspected, the sample should be re-tested with another FDA cleared, approved or authorized test, if coinfection would change clinical management. [facility] are certified under the Clinical Laboratory Improvement Amendments of 1988 (CLIA-88) as  qualified to perform moderate and/or high complexity laboratory testing.   UA with Microscopic reflex to Culture    Specimen: Urine, Midstream   Result Value Ref Range    Color Urine Straw Colorless, Straw, Light Yellow, Yellow    Appearance Urine Clear Clear    Glucose Urine Negative Negative mg/dL    Bilirubin Urine Negative Negative    Ketones Urine Negative Negative mg/dL    Specific Gravity Urine 1.010 1.003 - 1.035    Blood Urine Negative Negative    pH Urine 6.0 5.0 - 7.0    Protein Albumin Urine Negative Negative mg/dL    Urobilinogen Urine Normal Normal, 2.0 mg/dL    Nitrite Urine Negative Negative    Leukocyte Esterase Urine Negative Negative    Mucus Urine Present (A) None Seen /LPF    RBC Urine 0 <=2 /HPF    WBC Urine 1 <=5 /HPF    Squamous Epithelials Urine 2 (H) <=1 /HPF    Narrative    Urine Culture not indicated   Basic metabolic panel   Result Value Ref Range    Sodium 141 133 - 144 mmol/L    Potassium 3.3 (L) 3.4 - 5.3 mmol/L    Chloride 108 94 - 109 mmol/L    Carbon Dioxide (CO2) 26 20 - 32 mmol/L    Anion Gap 7 3 - 14 mmol/L    Urea Nitrogen 10 7 - 30 mg/dL    Creatinine 1.15 (H) 0.52 - 1.04 mg/dL    Calcium 8.2 (L) 8.5 - 10.1 mg/dL    Glucose 113 (H) 70 - 99 mg/dL    GFR Estimate 55 (L) >60 mL/min/1.73m2   CBC with platelets, differential    Narrative    The following orders were created for panel order CBC with platelets, differential.  Procedure                               Abnormality         Status                     ---------                                -----------         ------                     CBC with platelets and d...[[id number]]                      Final result                 Please view results for these tests on the individual orders.   [place] Draw    Narrative    The following orders were created for panel order [place] Draw.  Procedure                               Abnormality         Status                     ---------                               -----------         ------                     Extra Blue Top Tube[[id number]]                              Final result               Extra Red Top Tube[[id number]]                               Final result                 Please view results for these tests on the individual orders.   CBC with platelets and differential   Result Value Ref Range    WBC Count 4.7 4.0 - 11.0 10e3/uL    RBC Count 4.33 3.80 - 5.20 10e6/uL    Hemoglobin 12.8 11.7 - 15.7 g/dL    Hematocrit 38.0 35.0 - 47.0 %    MCV 88 78 - 100 fL    MCH 29.6 26.5 - 33.0 pg    MCHC 33.7 31.5 - 36.5 g/dL    RDW 12.8 10.0 - 15.0 %    Platelet Count 332 150 - 450 10e3/uL    % Neutrophils 59 %    % Lymphocytes 31 %    % Monocytes 9 %    % Eosinophils 1 %    % Basophils 0 %    % Immature Granulocytes 0 %    NRBCs per 100 WBC 0 <1 /100    Absolute Neutrophils 2.8 1.6 - 8.3 10e3/uL    Absolute Lymphocytes 1.5 0.8 - 5.3 10e3/uL    Absolute Monocytes 0.4 0.0 - 1.3 10e3/uL    Absolute Eosinophils 0.0 0.0 - 0.7 10e3/uL    Absolute Basophils 0.0 0.0 - 0.2 10e3/uL    Absolute Immature Granulocytes 0.0 <=0.4 10e3/uL    Absolute NRBCs 0.0 10e3/uL   Extra Red Top Tube   Result Value Ref Range    Hold Specimen JIC    Extra Blue Top Tube   Result Value Ref Range    Hold Specimen JIC        Medications   lactated ringers BOLUS 1,000 mL (0 mLs Intravenous Stopped 7/28/22 1228)   ketorolac (TORADOL) injection 15 mg (15 mg Intravenous Given 7/28/22 2308)   metoclopramide (REGLAN) injection 10 mg (10 mg Intravenous Given 7/28/22 2314)    loperamide (IMODIUM) capsule 4 mg (4 mg Oral Given 7/28/22 0170)       Assessments & Plan (with Medical Decision Making)  57 year old female, presenting the emergency department with concerns regarding slight amounts of headache, in addition to diffuse body aches.  Patient has had diarrhea over the past approximately 1 day.  Constellation of symptoms consistent with viral syndrome.  She is otherwise well-appearing, nontoxic, with benign abdominal exam.  Laboratory work-up showing normal white blood cell count.  Patient does have slight acute kidney injury, with creatinine 1.15, which is typically around 0.7-0.8.  Slight hypokalemia, likely secondary to her frequent episodes of watery stool.  Patient was given 1 L lactated ringer.  Also given Toradol, Reglan, and Imodium.  Patient felt improved prior to discharge home.  Likely viral etiology of her symptoms.  Encouraged to follow-up in clinic, and be seen if new or worsening symptoms develop.     I have reviewed the nursing notes.    I have reviewed the findings, diagnosis, plan and need for follow up with the patient.       Discharge Medication List as of 7/28/2022 11:40 PM          Final diagnoses:   Mild dehydration   Diarrhea, unspecified type   Tension headache       7/28/2022   [facility] EMERGENCY DEPT     Christiano Hudson MD  07/29/22 0011

## 2023-08-08 ENCOUNTER — HOSPITAL ENCOUNTER (OUTPATIENT)
Facility: CLINIC | Age: 58
End: 2023-08-08
Attending: INTERNAL MEDICINE | Admitting: INTERNAL MEDICINE
Payer: MEDICARE

## 2023-08-09 RX ORDER — ACETAMINOPHEN 500 MG
500-1000 TABLET ORAL EVERY 6 HOURS PRN
COMMUNITY
End: 2023-08-14 | Stop reason: HOSPADM

## 2023-08-09 RX ORDER — POLYETHYLENE GLYCOL 3350 17 G/17G
1 POWDER, FOR SOLUTION ORAL DAILY
COMMUNITY
End: 2023-08-14 | Stop reason: HOSPADM

## 2023-08-09 RX ORDER — DULOXETIN HYDROCHLORIDE 30 MG/1
30 CAPSULE, DELAYED RELEASE ORAL 2 TIMES DAILY
COMMUNITY
End: 2023-08-14 | Stop reason: HOSPADM

## 2023-08-09 RX ORDER — CYCLOBENZAPRINE HCL 10 MG
10 TABLET ORAL 3 TIMES DAILY PRN
COMMUNITY
End: 2023-08-14 | Stop reason: HOSPADM

## 2023-08-09 RX ORDER — OXYCODONE AND ACETAMINOPHEN 5; 325 MG/1; MG/1
1 TABLET ORAL EVERY 6 HOURS PRN
COMMUNITY
End: 2023-08-14 | Stop reason: HOSPADM

## 2023-08-09 RX ORDER — SIMETHICONE 80 MG
80 TABLET,CHEWABLE ORAL EVERY 6 HOURS PRN
COMMUNITY
End: 2023-08-14 | Stop reason: HOSPADM

## 2023-08-09 RX ORDER — ORAL SEMAGLUTIDE 7 MG/1
7 TABLET ORAL DAILY
COMMUNITY
End: 2023-08-14 | Stop reason: HOSPADM

## 2023-08-09 RX ORDER — CELECOXIB 200 MG/1
200 CAPSULE ORAL DAILY
COMMUNITY
End: 2023-08-14 | Stop reason: HOSPADM

## 2023-08-09 RX ORDER — HYDROXYZINE PAMOATE 25 MG/1
25 CAPSULE ORAL 3 TIMES DAILY PRN
COMMUNITY
End: 2023-08-14 | Stop reason: HOSPADM

## 2023-08-09 RX ORDER — ATORVASTATIN CALCIUM 40 MG/1
40 TABLET, FILM COATED ORAL DAILY
COMMUNITY
End: 2023-08-14 | Stop reason: HOSPADM

## 2023-10-23 RX ORDER — SEMAGLUTIDE 1.34 MG/ML
1 INJECTION, SOLUTION SUBCUTANEOUS
COMMUNITY
Start: 2021-11-02

## 2023-10-27 ENCOUNTER — TRANSFERRED RECORDS (OUTPATIENT)
Dept: MULTI SPECIALTY CLINIC | Facility: CLINIC | Age: 58
End: 2023-10-27

## 2023-10-27 LAB
ALBUMIN (URINE) MG/SPEC: 5.1 UG/ML
ALBUMIN/CREATININE RATIO: 5 MG/G CREAT (ref 0–29)
CREATININE (EXTERNAL): 1.03 MG/DL (ref 0.52–1.04)
CREATININE (URINE): 96.1 MG/DL
GFR ESTIMATED (EXTERNAL): 58 ML/MIN/1.73M2
GLUCOSE (EXTERNAL): 99.6 MG/DL (ref 70–99)
POTASSIUM (EXTERNAL): 3.6 MMOL/L (ref 3.3–4.7)

## 2023-11-05 ENCOUNTER — ANESTHESIA EVENT (OUTPATIENT)
Dept: SURGERY | Facility: CLINIC | Age: 58
End: 2023-11-05
Payer: MEDICARE

## 2023-11-06 ENCOUNTER — ANESTHESIA (OUTPATIENT)
Dept: SURGERY | Facility: CLINIC | Age: 58
End: 2023-11-06
Payer: MEDICARE

## 2023-11-06 ENCOUNTER — HOSPITAL ENCOUNTER (OUTPATIENT)
Facility: CLINIC | Age: 58
Discharge: HOME | End: 2023-11-06
Attending: INTERNAL MEDICINE | Admitting: INTERNAL MEDICINE
Payer: MEDICARE

## 2023-11-06 VITALS
HEART RATE: 79 BPM | BODY MASS INDEX: 49.84 KG/M2 | OXYGEN SATURATION: 100 % | RESPIRATION RATE: 18 BRPM | TEMPERATURE: 96.8 F | SYSTOLIC BLOOD PRESSURE: 134 MMHG | DIASTOLIC BLOOD PRESSURE: 68 MMHG | WEIGHT: 264 LBS | HEIGHT: 61 IN

## 2023-11-06 LAB — COLONOSCOPY: NORMAL

## 2023-11-06 PROCEDURE — 258N000003 HC RX IP 258 OP 636

## 2023-11-06 PROCEDURE — 360N000075 HC SURGERY LEVEL 2, PER MIN: Performed by: INTERNAL MEDICINE

## 2023-11-06 PROCEDURE — 272N000001 HC OR GENERAL SUPPLY STERILE: Performed by: INTERNAL MEDICINE

## 2023-11-06 PROCEDURE — 250N000009 HC RX 250

## 2023-11-06 PROCEDURE — 88305 TISSUE EXAM BY PATHOLOGIST: CPT | Mod: TC | Performed by: INTERNAL MEDICINE

## 2023-11-06 PROCEDURE — 370N000017 HC ANESTHESIA TECHNICAL FEE, PER MIN: Performed by: INTERNAL MEDICINE

## 2023-11-06 PROCEDURE — 999N000141 HC STATISTIC PRE-PROCEDURE NURSING ASSESSMENT: Performed by: INTERNAL MEDICINE

## 2023-11-06 PROCEDURE — 250N000011 HC RX IP 250 OP 636

## 2023-11-06 PROCEDURE — 710N000012 HC RECOVERY PHASE 2, PER MINUTE: Performed by: INTERNAL MEDICINE

## 2023-11-06 RX ORDER — ONDANSETRON 4 MG/1
4 TABLET, ORALLY DISINTEGRATING ORAL EVERY 6 HOURS PRN
Status: DISCONTINUED | OUTPATIENT
Start: 2023-11-06 | End: 2023-11-06 | Stop reason: HOSPADM

## 2023-11-06 RX ORDER — NALOXONE HYDROCHLORIDE 0.4 MG/ML
0.4 INJECTION, SOLUTION INTRAMUSCULAR; INTRAVENOUS; SUBCUTANEOUS
Status: DISCONTINUED | OUTPATIENT
Start: 2023-11-06 | End: 2023-11-06 | Stop reason: HOSPADM

## 2023-11-06 RX ORDER — PROPOFOL 10 MG/ML
INJECTION, EMULSION INTRAVENOUS PRN
Status: DISCONTINUED | OUTPATIENT
Start: 2023-11-06 | End: 2023-11-06

## 2023-11-06 RX ORDER — ONDANSETRON 4 MG/1
4 TABLET, ORALLY DISINTEGRATING ORAL EVERY 30 MIN PRN
Status: DISCONTINUED | OUTPATIENT
Start: 2023-11-06 | End: 2023-11-06 | Stop reason: HOSPADM

## 2023-11-06 RX ORDER — OXYCODONE HYDROCHLORIDE 5 MG/1
5 TABLET ORAL
Status: DISCONTINUED | OUTPATIENT
Start: 2023-11-06 | End: 2023-11-06 | Stop reason: HOSPADM

## 2023-11-06 RX ORDER — ONDANSETRON 2 MG/ML
4 INJECTION INTRAMUSCULAR; INTRAVENOUS EVERY 6 HOURS PRN
Status: DISCONTINUED | OUTPATIENT
Start: 2023-11-06 | End: 2023-11-06 | Stop reason: HOSPADM

## 2023-11-06 RX ORDER — SODIUM CHLORIDE, SODIUM LACTATE, POTASSIUM CHLORIDE, CALCIUM CHLORIDE 600; 310; 30; 20 MG/100ML; MG/100ML; MG/100ML; MG/100ML
INJECTION, SOLUTION INTRAVENOUS CONTINUOUS PRN
Status: DISCONTINUED | OUTPATIENT
Start: 2023-11-06 | End: 2023-11-06

## 2023-11-06 RX ORDER — FENTANYL CITRATE 50 UG/ML
25 INJECTION, SOLUTION INTRAMUSCULAR; INTRAVENOUS EVERY 5 MIN PRN
Status: DISCONTINUED | OUTPATIENT
Start: 2023-11-06 | End: 2023-11-06 | Stop reason: HOSPADM

## 2023-11-06 RX ORDER — OXYCODONE HYDROCHLORIDE 5 MG/1
10 TABLET ORAL
Status: DISCONTINUED | OUTPATIENT
Start: 2023-11-06 | End: 2023-11-06 | Stop reason: HOSPADM

## 2023-11-06 RX ORDER — ONDANSETRON 2 MG/ML
4 INJECTION INTRAMUSCULAR; INTRAVENOUS EVERY 30 MIN PRN
Status: DISCONTINUED | OUTPATIENT
Start: 2023-11-06 | End: 2023-11-06 | Stop reason: HOSPADM

## 2023-11-06 RX ORDER — SODIUM CHLORIDE, SODIUM LACTATE, POTASSIUM CHLORIDE, CALCIUM CHLORIDE 600; 310; 30; 20 MG/100ML; MG/100ML; MG/100ML; MG/100ML
INJECTION, SOLUTION INTRAVENOUS CONTINUOUS
Status: DISCONTINUED | OUTPATIENT
Start: 2023-11-06 | End: 2023-11-06 | Stop reason: HOSPADM

## 2023-11-06 RX ORDER — HYDROMORPHONE HCL IN WATER/PF 6 MG/30 ML
0.2 PATIENT CONTROLLED ANALGESIA SYRINGE INTRAVENOUS EVERY 5 MIN PRN
Status: DISCONTINUED | OUTPATIENT
Start: 2023-11-06 | End: 2023-11-06 | Stop reason: HOSPADM

## 2023-11-06 RX ORDER — PROCHLORPERAZINE MALEATE 10 MG
10 TABLET ORAL EVERY 6 HOURS PRN
Status: DISCONTINUED | OUTPATIENT
Start: 2023-11-06 | End: 2023-11-06 | Stop reason: HOSPADM

## 2023-11-06 RX ORDER — ONDANSETRON 2 MG/ML
INJECTION INTRAMUSCULAR; INTRAVENOUS PRN
Status: DISCONTINUED | OUTPATIENT
Start: 2023-11-06 | End: 2023-11-06

## 2023-11-06 RX ORDER — LIDOCAINE 40 MG/G
CREAM TOPICAL
Status: DISCONTINUED | OUTPATIENT
Start: 2023-11-06 | End: 2023-11-06 | Stop reason: HOSPADM

## 2023-11-06 RX ORDER — NALOXONE HYDROCHLORIDE 0.4 MG/ML
0.2 INJECTION, SOLUTION INTRAMUSCULAR; INTRAVENOUS; SUBCUTANEOUS
Status: DISCONTINUED | OUTPATIENT
Start: 2023-11-06 | End: 2023-11-06 | Stop reason: HOSPADM

## 2023-11-06 RX ORDER — FENTANYL CITRATE 50 UG/ML
50 INJECTION, SOLUTION INTRAMUSCULAR; INTRAVENOUS EVERY 5 MIN PRN
Status: DISCONTINUED | OUTPATIENT
Start: 2023-11-06 | End: 2023-11-06 | Stop reason: HOSPADM

## 2023-11-06 RX ORDER — OXYCODONE AND ACETAMINOPHEN 5; 325 MG/1; MG/1
1 TABLET ORAL EVERY 6 HOURS PRN
COMMUNITY

## 2023-11-06 RX ORDER — PROPOFOL 10 MG/ML
INJECTION, EMULSION INTRAVENOUS CONTINUOUS PRN
Status: DISCONTINUED | OUTPATIENT
Start: 2023-11-06 | End: 2023-11-06

## 2023-11-06 RX ORDER — HYDROMORPHONE HCL IN WATER/PF 6 MG/30 ML
0.4 PATIENT CONTROLLED ANALGESIA SYRINGE INTRAVENOUS EVERY 5 MIN PRN
Status: DISCONTINUED | OUTPATIENT
Start: 2023-11-06 | End: 2023-11-06 | Stop reason: HOSPADM

## 2023-11-06 RX ORDER — LIDOCAINE HYDROCHLORIDE 10 MG/ML
INJECTION, SOLUTION INFILTRATION; PERINEURAL PRN
Status: DISCONTINUED | OUTPATIENT
Start: 2023-11-06 | End: 2023-11-06

## 2023-11-06 RX ADMIN — PROPOFOL 160 MCG/KG/MIN: 10 INJECTION, EMULSION INTRAVENOUS at 09:18

## 2023-11-06 RX ADMIN — LIDOCAINE HYDROCHLORIDE 2 ML: 10 INJECTION, SOLUTION INFILTRATION; PERINEURAL at 09:16

## 2023-11-06 RX ADMIN — PROPOFOL 40 MG: 10 INJECTION, EMULSION INTRAVENOUS at 09:17

## 2023-11-06 RX ADMIN — ONDANSETRON 4 MG: 2 INJECTION INTRAMUSCULAR; INTRAVENOUS at 09:15

## 2023-11-06 RX ADMIN — SODIUM CHLORIDE, POTASSIUM CHLORIDE, SODIUM LACTATE AND CALCIUM CHLORIDE: 600; 310; 30; 20 INJECTION, SOLUTION INTRAVENOUS at 08:51

## 2023-11-06 RX ADMIN — PROPOFOL 40 MG: 10 INJECTION, EMULSION INTRAVENOUS at 09:19

## 2023-11-06 ASSESSMENT — ACTIVITIES OF DAILY LIVING (ADL): ADLS_ACUITY_SCORE: 35

## 2023-11-06 NOTE — ANESTHESIA PREPROCEDURE EVALUATION
Anesthesia Pre-Procedure Evaluation    Patient: Tish De León   MRN: [id number] : 1965        Procedure : Procedure(s):  COLONOSCOPY          Past Medical History:   Diagnosis Date    Abnormal glucose 3/29/2017    Acute abdominal pain 2012    Anxiety 2012    Arthritis     Asthma     Cardiomyopathy, peripartum, postpartum     CHF (congestive heart failure) (H)     normal echo     Degenerative disc disease, thoracic     Dependent edema 8/10/2012    Depressive disorder     Family history of thyroid disease 3/29/2017    GERD (gastroesophageal reflux disease)     Hyperlipidemia     Obesity     Sickle cell trait (H) 2012    Sleep apnea     CPAP    Ulcer (H)     Ulcer (H)       Past Surgical History:   Procedure Laterality Date     SECTION  1993    x4    CHOLECYSTECTOMY  2015    ESOPHAGOSCOPY, GASTROSCOPY, DUODENOSCOPY (EGD), COMBINED  2013    Procedure: COMBINED ESOPHAGOSCOPY, GASTROSCOPY, DUODENOSCOPY (EGD), BIOPSY SINGLE OR MULTIPLE;  COMBINED ESOPHAGOSCOPY, GASTROSCOPY, DUODENOSCOPY (EGD);  Surgeon: Luis Ma MD;  Location: [facility]    ESOPHAGOSCOPY, GASTROSCOPY, DUODENOSCOPY (EGD), COMBINED N/A 2016    Procedure: COMBINED ESOPHAGOSCOPY, GASTROSCOPY, DUODENOSCOPY (EGD);  Surgeon: Kirit Hutchinson MD;  Location: [facility]    ESOPHAGOSCOPY, GASTROSCOPY, DUODENOSCOPY (EGD), COMBINED N/A 2016    Procedure: COMBINED ESOPHAGOSCOPY, GASTROSCOPY, DUODENOSCOPY (EGD), BIOPSY SINGLE OR MULTIPLE;  Surgeon: Kirit Hutchinson MD;  Location: [facility]    ESOPHAGOSCOPY, GASTROSCOPY, DUODENOSCOPY (EGD), COMBINED N/A 2017    Procedure: COMBINED ESOPHAGOSCOPY, GASTROSCOPY, DUODENOSCOPY (EGD), BIOPSY SINGLE OR MULTIPLE;  COMBINED ESOPHAGOSCOPY, GASTROSCOPY, DUODENOSCOPY (EGD) (MAC SEDATION);  Surgeon: Meagan Ott MD;  Location:  GI    ORTHOPEDIC SURGERY      Knee surgery    SINUS SURGERY  10/2011    Nasal surgery      Allergies   Allergen Reactions    Lactose      Other  reaction(s): Intolerance-Can't Take    No Clinical Screening - See Comments      PN: LW Other1: -Milk = GI upset  PN: LW FI1: ice cream,peanuts,pork \T\ more w/testing  LW FI2:      Social History     Tobacco Use    Smoking status: Never    Smokeless tobacco: Never   Substance Use Topics    Alcohol use: No     Alcohol/week: 0.0 standard drinks of alcohol      Wt Readings from Last 1 Encounters:   07/28/22 117.5 kg (259 lb)        Anesthesia Evaluation   Pt has had prior anesthetic.     History of anesthetic complications       ROS/MED HX  ENT/Pulmonary:     (+) sleep apnea, uses CPAP,                   asthma                  Neurologic:  - neg neurologic ROS     Cardiovascular:  - neg cardiovascular ROS   (+)  - -   -  - -      CHF                                METS/Exercise Tolerance: >4 METS    Hematologic:  - neg hematologic  ROS     Musculoskeletal:  - neg musculoskeletal ROS     GI/Hepatic:     (+) GERD,                   Renal/Genitourinary:  - neg Renal ROS     Endo:  - neg endo ROS   (+)               Obesity,       Psychiatric/Substance Use:     (+) psychiatric history anxiety and depression       Infectious Disease:  - neg infectious disease ROS     Malignancy:  - neg malignancy ROS     Other:  - neg other ROS          Physical Exam    Airway        Mallampati: II   TM distance: > 3 FB   Neck ROM: full   Mouth opening: > 3 cm    Respiratory Devices and Support         Dental  no notable dental history     (+) Minor Abnormalities - some fillings, tiny chips      Cardiovascular   cardiovascular exam normal          Pulmonary   pulmonary exam normal                OUTSIDE LABS:  CBC:   Lab Results   Component Value Date    WBC 4.7 07/28/2022    WBC 6.4 08/15/2017    HGB 12.8 07/28/2022    HGB 13.2 08/15/2017    HCT 38.0 07/28/2022    HCT 38.0 08/15/2017     07/28/2022     08/15/2017     BMP:   Lab Results   Component Value Date     07/28/2022     08/15/2017    POTASSIUM 3.3 (L)  "07/28/2022    POTASSIUM 3.9 08/15/2017    CHLORIDE 108 07/28/2022    CHLORIDE 105 08/15/2017    CO2 26 07/28/2022    CO2 25 08/15/2017    BUN 10 07/28/2022    BUN 10 08/15/2017    CR 1.15 (H) 07/28/2022    CR 0.74 08/15/2017     (H) 07/28/2022     (H) 08/15/2017     COAGS: No results found for: \"PTT\", \"INR\", \"FIBR\"  POC:   Lab Results   Component Value Date     (H) 08/15/2017    HCG Negative 08/15/2017     HEPATIC:   Lab Results   Component Value Date    ALBUMIN 3.3 (L) 08/15/2017    PROTTOTAL 8.0 08/15/2017    ALT 34 08/15/2017    AST 13 08/15/2017    ALKPHOS 109 08/15/2017    BILITOTAL 0.2 08/15/2017     OTHER:   Lab Results   Component Value Date    A1C 6.2 (H) 03/29/2017    PAZ 8.2 (L) 07/28/2022    LIPASE 126 03/31/2017    AMYLASE 81 08/21/2012    TSH 0.98 08/15/2017    CRP 20.0 (H) 07/14/2016    SED 35 (H) 07/14/2016       Anesthesia Plan    ASA Status:  3    NPO Status:  NPO Appropriate    Anesthesia Type: MAC.   Induction: Intravenous, Propofol.   Maintenance: TIVA.        Consents    Anesthesia Plan(s) and associated risks, benefits, and realistic alternatives discussed. Questions answered and patient/representative(s) expressed understanding.     - Discussed: Risks, Benefits and Alternatives for BOTH SEDATION and the PROCEDURE were discussed     - Discussed with:  Patient       - Patient is DNR/DNI Status: No          Postoperative Care    Pain management: IV analgesics, Oral pain medications, Multi-modal analgesia.   PONV prophylaxis: Ondansetron (or other 5HT-3), Dexamethasone or Solumedrol     Comments:    Other Comments: TIVA with Propofol  Zofran for PONV            Miguelito Painter MD  "

## 2023-11-06 NOTE — ANESTHESIA POSTPROCEDURE EVALUATION
Patient: Tish De León    Procedure: Procedure(s):  COLONOSCOPY WITH POLYPECTOMY       Anesthesia Type:  MAC    Note:  Disposition: Outpatient   Postop Pain Control: Uneventful            Sign Out: Well controlled pain   PONV: No   Neuro/Psych: Uneventful            Sign Out: Acceptable/Baseline neuro status   Airway/Respiratory: Uneventful            Sign Out: Acceptable/Baseline resp. status   CV/Hemodynamics: Uneventful            Sign Out: Acceptable CV status; No obvious hypovolemia; No obvious fluid overload   Other NRE: NONE   DID A NON-ROUTINE EVENT OCCUR? No           Last vitals:  Vitals Value Taken Time   /68 11/06/23 1000   Temp 36  C (96.8  F) 11/06/23 1000   Pulse 79 11/06/23 1000   Resp 18 11/06/23 0942   SpO2 100 % 11/06/23 1000       Electronically Signed By: Miguelito Painter MD  November 6, 2023  10:18 AM

## 2023-11-06 NOTE — ANESTHESIA CARE TRANSFER NOTE
Patient: Tish De León    Procedure: Procedure(s):  COLONOSCOPY WITH POLYPECTOMY       Diagnosis: Screening for colon cancer [Z12.11]  Diagnosis Additional Information: No value filed.    Anesthesia Type:   MAC     Note:    Oropharynx: oropharynx clear of all foreign objects  Level of Consciousness: awake  Oxygen Supplementation: room air    Independent Airway: airway patency satisfactory and stable  Dentition: dentition unchanged  Vital Signs Stable: post-procedure vital signs reviewed and stable  Report to RN Given: handoff report given  Patient transferred to: Phase II    Handoff Report: Identifed the Patient, Identified the Reponsible Provider, Reviewed the pertinent medical history, Discussed the surgical course, Reviewed Intra-OP anesthesia mangement and issues during anesthesia, Set expectations for post-procedure period and Allowed opportunity for questions and acknowledgement of understanding      Vitals:  Vitals Value Taken Time   /71 11/06/23 0943   Temp 35.9  C (96.7  F) 11/06/23 0942   Pulse 96 11/06/23 0944   Resp 18 11/06/23 0942   SpO2 97 % 11/06/23 0944   Vitals shown include unfiled device data.    Electronically Signed By: CYNTHIA Houston CRNA  November 6, 2023  9:45 AM

## 2023-11-06 NOTE — INTERVAL H&P NOTE
I have reviewed the surgical (or preoperative) H&P that is linked to this encounter, and examined the patient. There are no significant changes.    Fady Cassidy MD

## 2023-11-07 LAB
PATH REPORT.COMMENTS IMP SPEC: NORMAL
PATH REPORT.COMMENTS IMP SPEC: NORMAL
PATH REPORT.FINAL DX SPEC: NORMAL
PATH REPORT.GROSS SPEC: NORMAL
PATH REPORT.MICROSCOPIC SPEC OTHER STN: NORMAL
PATH REPORT.RELEVANT HX SPEC: NORMAL
PHOTO IMAGE: NORMAL

## 2023-11-07 PROCEDURE — 88305 TISSUE EXAM BY PATHOLOGIST: CPT | Mod: 26 | Performed by: PATHOLOGY

## 2024-04-22 ENCOUNTER — MEDICAL CORRESPONDENCE (OUTPATIENT)
Dept: HEALTH INFORMATION MANAGEMENT | Facility: CLINIC | Age: 59
End: 2024-04-22
Payer: MEDICARE

## 2024-05-02 ENCOUNTER — TRANSCRIBE ORDERS (OUTPATIENT)
Dept: OTHER | Age: 59
End: 2024-05-02

## 2024-05-02 DIAGNOSIS — U09.9 POST-COVID CHRONIC NEUROLOGIC SYMPTOMS: Primary | ICD-10-CM

## 2024-05-02 DIAGNOSIS — R29.90 POST-COVID CHRONIC NEUROLOGIC SYMPTOMS: Primary | ICD-10-CM

## (undated) DEVICE — TUBING SUCTION MEDI-VAC 1/4"X20' N620A

## (undated) DEVICE — ENDO SNARE EXACTO COLD 9MM LOOP 2.4MMX230CM 00711115

## (undated) DEVICE — SOL WATER IRRIG 1000ML BOTTLE 2F7114

## (undated) DEVICE — SUCTION MANIFOLD NEPTUNE 2 SYS 1 PORT 702-025-000